# Patient Record
Sex: FEMALE | Race: WHITE | Employment: UNEMPLOYED | URBAN - METROPOLITAN AREA
[De-identification: names, ages, dates, MRNs, and addresses within clinical notes are randomized per-mention and may not be internally consistent; named-entity substitution may affect disease eponyms.]

---

## 2017-05-10 ENCOUNTER — ED HISTORICAL/CONVERTED ENCOUNTER (OUTPATIENT)
Dept: OTHER | Age: 62
End: 2017-05-10

## 2019-04-26 ENCOUNTER — ED HISTORICAL/CONVERTED ENCOUNTER (OUTPATIENT)
Dept: OTHER | Age: 64
End: 2019-04-26

## 2020-03-05 ENCOUNTER — APPOINTMENT (OUTPATIENT)
Dept: GENERAL RADIOLOGY | Age: 65
End: 2020-03-05
Attending: EMERGENCY MEDICINE
Payer: MEDICARE

## 2020-03-05 ENCOUNTER — HOSPITAL ENCOUNTER (EMERGENCY)
Age: 65
Discharge: HOME | End: 2020-03-06
Attending: EMERGENCY MEDICINE
Payer: MEDICARE

## 2020-03-05 DIAGNOSIS — S42.202A CLOSED TRAUMATIC NONDISPLACED FRACTURE OF PROXIMAL END OF LEFT HUMERUS, INITIAL ENCOUNTER: Primary | ICD-10-CM

## 2020-03-05 PROCEDURE — 99283 EMERGENCY DEPT VISIT LOW MDM: CPT

## 2020-03-05 PROCEDURE — 73060 X-RAY EXAM OF HUMERUS: CPT

## 2020-03-05 PROCEDURE — 74011250637 HC RX REV CODE- 250/637: Performed by: EMERGENCY MEDICINE

## 2020-03-05 RX ORDER — OXYCODONE AND ACETAMINOPHEN 7.5; 325 MG/1; MG/1
1 TABLET ORAL
Status: COMPLETED | OUTPATIENT
Start: 2020-03-05 | End: 2020-03-05

## 2020-03-05 RX ORDER — OXYCODONE AND ACETAMINOPHEN 5; 325 MG/1; MG/1
1 TABLET ORAL
Qty: 12 TAB | Refills: 0 | Status: SHIPPED | OUTPATIENT
Start: 2020-03-05 | End: 2020-03-08

## 2020-03-05 RX ORDER — ONDANSETRON 4 MG/1
4 TABLET, ORALLY DISINTEGRATING ORAL
Status: COMPLETED | OUTPATIENT
Start: 2020-03-05 | End: 2020-03-05

## 2020-03-05 RX ADMIN — OXYCODONE HYDROCHLORIDE AND ACETAMINOPHEN 1 TABLET: 7.5; 325 TABLET ORAL at 23:49

## 2020-03-05 RX ADMIN — ONDANSETRON 4 MG: 4 TABLET, ORALLY DISINTEGRATING ORAL at 23:49

## 2020-03-06 VITALS
SYSTOLIC BLOOD PRESSURE: 112 MMHG | OXYGEN SATURATION: 97 % | HEIGHT: 68 IN | RESPIRATION RATE: 18 BRPM | BODY MASS INDEX: 30.31 KG/M2 | TEMPERATURE: 97.3 F | WEIGHT: 200 LBS | DIASTOLIC BLOOD PRESSURE: 72 MMHG | HEART RATE: 90 BPM

## 2020-03-06 NOTE — DISCHARGE INSTRUCTIONS
Patient Education        Humerus Fracture: Care Instructions  Your Care Instructions    Your humerus is a bone in your upper arm. It extends from your shoulder to your elbow, and it is the largest bone in your arm. This bone may break (fracture) during sports, a fall, or other accidents. It may happen when your arm or shoulder is hit or used to protect you in a fall. Fractures can range from a small, hairline crack to a bone or bones broken into two or more pieces. Your treatment depends on how bad the break is. Your doctor may have put your arm in a cast, splint, or sling to allow it to heal or to keep it stable until you see another doctor. It may take weeks or months for your arm to heal. You can help your arm heal with some care at home. You heal best when you take good care of yourself. Eat a variety of healthy foods, and don't smoke. Follow-up care is a key part of your treatment and safety. Be sure to make and go to all appointments, and call your doctor if you are having problems. It's also a good idea to know your test results and keep a list of the medicines you take. How can you care for yourself at home? · Put ice or a cold pack on your arm for 10 to 20 minutes at a time. Try to do this every 1 to 2 hours for the next 3 days (when you are awake). Put a thin cloth between the ice and your cast or splint. Keep the cast or splint dry. If you do not have a splint or cast, use a cloth between the ice and your skin. · Follow the care instructions your doctor gives you. If you have a sling, do not take it off unless your doctor tells you to. · Be safe with medicines. Take pain medicines exactly as directed. ? If the doctor gave you a prescription medicine for pain, take it as prescribed. ? If you are not taking a prescription pain medicine, ask your doctor if you can take an over-the-counter medicine. · Your doctor may advise you to keep your arm next to your body.  It may help to use a pillow to support your elbow while sitting. · Follow instructions for moving your arm and doing exercises to keep your arm strong. · Wiggle your fingers and wrist often to reduce swelling and stiffness. When should you call for help? Call your doctor now or seek immediate medical care if:    · You have increased or severe pain in your arm.     · Your hand is cool or pale or changes color.     · You have tingling, weakness, or numbness in your hand or fingers.     · Your cast or splint feels too tight.     · You cannot move your fingers.     · The skin under your cast or splint is burning or stinging.    Watch closely for changes in your health, and be sure to contact your doctor if:    · You do not get better as expected. Where can you learn more? Go to http://joni-brunilda.info/. Enter P869 in the search box to learn more about \"Humerus Fracture: Care Instructions. \"  Current as of: June 26, 2019  Content Version: 12.2  © 3262-8844 [employer]. Care instructions adapted under license by [employer] (which disclaims liability or warranty for this information). If you have questions about a medical condition or this instruction, always ask your healthcare professional. [address] any warranty or liability for your use of this information.

## 2020-03-06 NOTE — ED PROVIDER NOTES
62 y.o. female with past medical history significant for diabetes and depression who presents from home accompanied by two family members with chief complaint of fall. She states she climbing up stairs an hour ago today when she lost her footing and fell, hitting her head against the wall and landing with her left arm torqued backwards behind her body. She complains of left arm pain, worsened with movement, as well as nausea and dizziness. She denies losing consciousness, and denies neck pain, headache, numbness, tingling, chest pain, abdominal pain, and leg pain. There are no other acute medical concerns at this time. Social hx: former smoker, rare alcohol use, denies illicit drug use  Surgical hx: none  PCP: None    Note written by Glynn Salazar, as dictated by Ralph Obando MD 10:59 PM        The history is provided by the patient. No  was used. Past Medical History:   Diagnosis Date    Diabetes ([facility] [address]     Psychiatric disorder     depression       No past surgical history on file. No family history on file.     Social History     Socioeconomic History    Marital status: SINGLE     Spouse name: Not on file    Number of children: Not on file    Years of education: Not on file    Highest education level: Not on file   Occupational History    Not on file   Social Needs    Financial resource strain: Not on file    Food insecurity:     Worry: Not on file     Inability: Not on file    Transportation needs:     Medical: Not on file     Non-medical: Not on file   Tobacco Use    Smoking status: Former Smoker   Substance and Sexual Activity    Alcohol use: Yes     Comment: rarely    Drug use: No    Sexual activity: Not on file   Lifestyle    Physical activity:     Days per week: Not on file     Minutes per session: Not on file    Stress: Not on file   Relationships    Social connections:     Talks on phone: Not on file     Gets together: Not on file     Attends Jain service: Not on file     Active member of club or organization: Not on file     Attends meetings of clubs or organizations: Not on file     Relationship status: Not on file    Intimate partner violence:     Fear of current or ex partner: Not on file     Emotionally abused: Not on file     Physically abused: Not on file     Forced sexual activity: Not on file   Other Topics Concern    Not on file   Social History Narrative    Not on file         ALLERGIES: Patient has no known allergies. Review of Systems   Cardiovascular: Negative for chest pain. Gastrointestinal: Positive for nausea. Negative for abdominal pain. Musculoskeletal: Negative for neck pain. Positive for arm pain (left)  Negative for leg pain   Neurological: Positive for dizziness. Negative for syncope, numbness and headaches. Negative for tingling   All other systems reviewed and are negative. Vitals:    03/05/20 2252 03/05/20 2352   BP: 112/72 (P) 100/88   Pulse: 90 (P) 99   Resp: 18 (P) 20   Temp: 97.3 °F (36.3 °C) (P) 98.5 °F (36.9 °C)   SpO2: 97% (P) 95%   Weight: 90.7 kg (200 lb)    Height: 5' 8\" (1.727 m)             Physical Exam  Vitals signs and nursing note reviewed. Constitutional:       Appearance: She is well-developed. Comments: Appears uncomfortable. HENT:      Head: Normocephalic and atraumatic. Eyes:      General: No scleral icterus. Conjunctiva/sclera: Conjunctivae normal.   Neck:      Musculoskeletal: Neck supple. Trachea: No tracheal deviation. Cardiovascular:      Rate and Rhythm: Normal rate and regular rhythm. Heart sounds: Normal heart sounds. No murmur. No friction rub. No gallop. Comments: 2+ radial pulses. Pulmonary:      Effort: Pulmonary effort is normal.      Breath sounds: Normal breath sounds. No wheezing or rales. Abdominal:      General: There is no distension. Palpations: Abdomen is soft. Tenderness: There is no abdominal tenderness. There is no guarding or rebound. Musculoskeletal:      Comments: Tenderness in left upper arm, resists movement of left arm. No neck tenderness. Skin:     General: Skin is warm and dry. Findings: No rash. Neurological:      Mental Status: She is alert and oriented to person, place, and time. Note written by Glynn Gee, as dictated by Jenna Gandara MD 10:59 PM      [facility]       Procedures    Progress Note:  Results, treatment, and follow up plan have been discussed with patient/family. Questions were answered. Brittani Balderas MD    Assessment/plan: Fall suffering a proximal left humerus fracture -neurovascularly intact. No signs of a severe head injury. Sling, ice, Ortho follow-up. Percocet for pain.   Brittani Balderas MD

## 2020-03-06 NOTE — ED TRIAGE NOTES
Pt reports slipped and fall from stairs. Pt hit her head with the wall and left arm was on the back when she landed on the floor. Pt also c/o nausea. Denies LOC, dizziness, headche, CP, or SOB. Pt unable to move her left arm d/t severe pain.

## 2020-03-06 NOTE — ED NOTES
Patient discharged from ED by provider. Discharge instructions reviewed with patient and all questions answered. Patient Wheelchair from ED in NAD.

## 2020-11-13 ENCOUNTER — HOSPITAL ENCOUNTER (EMERGENCY)
Age: 65
Discharge: HOME | End: 2020-11-13
Attending: EMERGENCY MEDICINE
Payer: MEDICARE

## 2020-11-13 ENCOUNTER — APPOINTMENT (OUTPATIENT)
Dept: GENERAL RADIOLOGY | Age: 65
End: 2020-11-13
Attending: EMERGENCY MEDICINE
Payer: MEDICARE

## 2020-11-13 ENCOUNTER — APPOINTMENT (OUTPATIENT)
Dept: CT IMAGING | Age: 65
End: 2020-11-13
Attending: EMERGENCY MEDICINE
Payer: MEDICARE

## 2020-11-13 VITALS
BODY MASS INDEX: 28.79 KG/M2 | HEIGHT: 68 IN | DIASTOLIC BLOOD PRESSURE: 95 MMHG | HEART RATE: 80 BPM | WEIGHT: 190 LBS | RESPIRATION RATE: 16 BRPM | SYSTOLIC BLOOD PRESSURE: 143 MMHG | TEMPERATURE: 98.6 F | OXYGEN SATURATION: 99 %
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
## 2020-11-13 DIAGNOSIS — S42.295D OTHER CLOSED NONDISPLACED FRACTURE OF PROXIMAL END OF LEFT HUMERUS WITH ROUTINE HEALING, SUBSEQUENT ENCOUNTER: ICD-10-CM

## 2020-11-13 DIAGNOSIS — R73.9 HYPERGLYCEMIA: ICD-10-CM

## 2020-11-13 LAB
ALBUMIN SERPL-MCNC: 3.3 G/DL (ref 3.5–5)
ALBUMIN/GLOB SERPL: 0.9 {RATIO} (ref 1.1–2.2)
ALP SERPL-CCNC: 104 U/L (ref 45–117)
ALT SERPL-CCNC: 27 U/L (ref 12–78)
ANION GAP SERPL CALC-SCNC: 8 MMOL/L (ref 5–15)
APPEARANCE UR: CLEAR
AST SERPL W P-5'-P-CCNC: 14 U/L (ref 15–37)
BACTERIA URNS QL MICRO: NEGATIVE /HPF
BASOPHILS # BLD: 0 K/UL (ref 0–0.1)
BASOPHILS NFR BLD: 0 % (ref 0–1)
BILIRUB SERPL-MCNC: 0.2 MG/DL (ref 0.2–1)
BILIRUB UR QL: NEGATIVE
BUN SERPL-MCNC: 16 MG/DL (ref 6–20)
BUN/CREAT SERPL: 19 (ref 12–20)
CA-I BLD-MCNC: 9 MG/DL (ref 8.5–10.1)
CHLORIDE SERPL-SCNC: 101 MMOL/L (ref 97–108)
CO2 SERPL-SCNC: 27 MMOL/L (ref 21–32)
COLOR UR: YELLOW
CREAT SERPL-MCNC: 0.83 MG/DL (ref 0.55–1.02)
DIFFERENTIAL METHOD BLD: NORMAL
EOSINOPHIL # BLD: 0.2 K/UL (ref 0–0.4)
EOSINOPHIL NFR BLD: 2 % (ref 0–7)
EPITH CASTS URNS QL MICRO: ABNORMAL /LPF
ERYTHROCYTE [DISTWIDTH] IN BLOOD BY AUTOMATED COUNT: 12.4 % (ref 11.5–14.5)
GLOBULIN SER CALC-MCNC: 3.8 G/DL (ref 2–4)
GLUCOSE BLD STRIP.AUTO-MCNC: 309 MG/DL (ref 65–100)
GLUCOSE SERPL-MCNC: 314 MG/DL (ref 65–100)
GLUCOSE UR STRIP.AUTO-MCNC: >1000 MG/DL
HCT VFR BLD AUTO: 38.7 % (ref 35–47)
HGB BLD-MCNC: 13.4 G/DL (ref 11.5–16)
HGB UR QL STRIP: NEGATIVE
IMM GRANULOCYTES # BLD AUTO: 0 K/UL (ref 0–0.04)
IMM GRANULOCYTES NFR BLD AUTO: 0 % (ref 0–0.5)
KETONES UR QL STRIP.AUTO: NEGATIVE MG/DL
LEUKOCYTE ESTERASE UR QL STRIP.AUTO: NEGATIVE
LYMPHOCYTES # BLD: 2.4 K/UL (ref 0.8–3.5)
LYMPHOCYTES NFR BLD: 30 % (ref 12–49)
MCH RBC QN AUTO: 31.1 PG (ref 26–34)
MCHC RBC AUTO-ENTMCNC: 34.6 G/DL (ref 30–36.5)
MCV RBC AUTO: 89.8 FL (ref 80–99)
MONOCYTES # BLD: 0.6 K/UL (ref 0–1)
MONOCYTES NFR BLD: 8 % (ref 5–13)
NEUTS SEG # BLD: 4.8 K/UL (ref 1.8–8)
NEUTS SEG NFR BLD: 60 % (ref 32–75)
NITRITE UR QL STRIP.AUTO: NEGATIVE
PERFORMED BY, TECHID: ABNORMAL
PH UR STRIP: 5 [PH] (ref 5–8)
PLATELET # BLD AUTO: 242 K/UL (ref 150–400)
PMV BLD AUTO: 9.3 FL (ref 8.9–12.9)
POTASSIUM SERPL-SCNC: 4 MMOL/L (ref 3.5–5.1)
PROT SERPL-MCNC: 7.1 G/DL (ref 6.4–8.2)
PROT UR STRIP-MCNC: NEGATIVE MG/DL
RBC # BLD AUTO: 4.31 M/UL (ref 3.8–5.2)
RBC #/AREA URNS HPF: ABNORMAL /HPF (ref 0–5)
SODIUM SERPL-SCNC: 136 MMOL/L (ref 136–145)
SP GR UR REFRACTOMETRY: 1.01 (ref 1–1.03)
TROPONIN I SERPL-MCNC: <0.05 NG/ML
UROBILINOGEN UR QL STRIP.AUTO: 0.1 EU/DL (ref 0.2–1)
WBC # BLD AUTO: 7.9 K/UL (ref 3.6–11)
WBC URNS QL MICRO: ABNORMAL /HPF (ref 0–4)

## 2020-11-13 PROCEDURE — 99284 EMERGENCY DEPT VISIT MOD MDM: CPT

## 2020-11-13 PROCEDURE — 85025 COMPLETE CBC W/AUTO DIFF WBC: CPT

## 2020-11-13 PROCEDURE — 80053 COMPREHEN METABOLIC PANEL: CPT

## 2020-11-13 PROCEDURE — 81001 URINALYSIS AUTO W/SCOPE: CPT

## 2020-11-13 PROCEDURE — 84484 ASSAY OF TROPONIN QUANT: CPT

## 2020-11-13 PROCEDURE — 82962 GLUCOSE BLOOD TEST: CPT

## 2020-11-13 PROCEDURE — 70450 CT HEAD/BRAIN W/O DYE: CPT

## 2020-11-13 PROCEDURE — 74011250636 HC RX REV CODE- 250/636: Performed by: EMERGENCY MEDICINE

## 2020-11-13 PROCEDURE — 73060 X-RAY EXAM OF HUMERUS: CPT

## 2020-11-13 PROCEDURE — 36415 COLL VENOUS BLD VENIPUNCTURE: CPT

## 2020-11-13 RX ORDER — SODIUM CHLORIDE 0.9 % (FLUSH) 0.9 %
5-40 SYRINGE (ML) INJECTION AS NEEDED
Status: DISCONTINUED | OUTPATIENT
Start: 2020-11-13 | End: 2020-11-14 | Stop reason: HOSPADM

## 2020-11-13 RX ORDER — SODIUM CHLORIDE 0.9 % (FLUSH) 0.9 %
5-40 SYRINGE (ML) INJECTION EVERY 8 HOURS
Status: DISCONTINUED | OUTPATIENT
Start: 2020-11-13 | End: 2020-11-14 | Stop reason: HOSPADM

## 2020-11-13 RX ADMIN — SODIUM CHLORIDE 1000 ML: 9 INJECTION, SOLUTION INTRAVENOUS at 20:04

## 2020-11-13 NOTE — ED PROVIDER NOTES
EMERGENCY DEPARTMENT HISTORY AND PHYSICAL EXAM      Date: 11/13/2020  Patient Name: Abigail Hardy      History of Presenting Illness     Chief Complaint   Patient presents with    Fall    High Blood Sugar    Dizziness       History Provided By: Patient    HPI: Abigail Hardy, 60 y.o. female with a past medical history significant diabetes and And humerus fracture within the past few months presents to the ED with cc of tripping over a speed bump and falling hitting her left arm and hitting her head. Patient conveys she does not remember hitting her head but denies having any loss of consciousness. This was witnessed by her son who also relates the same story. She states now that she does have a sharp pain in her left arm which is of her primary concern. She has mild headache but she has not treated it with anything. She specifically denies fever, chills, nausea, vomiting, chest pain, shortness of breath, rash, diarrhea, night sweats. There are no other complaints, changes, or physical findings at this time. PCP: None    Current Facility-Administered Medications   Medication Dose Route Frequency Provider Last Rate Last Dose    sodium chloride (NS) flush 5-40 mL  5-40 mL IntraVENous Q8H Melvin Plasencia MD        sodium chloride (NS) flush 5-40 mL  5-40 mL IntraVENous PRN Melvin Plasencia MD         Current Outpatient Medications   Medication Sig Dispense Refill    traMADol-acetaminophen (ULTRACET) 37.5-325 mg per tablet Take 1 Tab by mouth every six (6) hours as needed for Pain. Max Daily Amount: 4 Tabs. 20 Tab 0    metFORMIN (GLUCOPHAGE) 1,000 mg tablet Take 1,000 mg by mouth two (2) times daily (with meals). Indications: TYPE 2 DIABETES MELLITUS      glipiZIDE (GLUCOTROL) 5 mg tablet Take 5 mg by mouth two (2) times a day. Indications: TYPE 2 DIABETES MELLITUS      PARoxetine (PAXIL) 10 mg tablet Take  by mouth daily.  Indications: DEPRESSION         Past History     Past Medical History:  Past Medical History:   Diagnosis Date    Diabetes ([facility] [address]     Psychiatric disorder     depression       Past Surgical History:  No past surgical history on file. Family History:  No family history on file. Social History:  Social History     Tobacco Use    Smoking status: Former Smoker   Substance Use Topics    Alcohol use: Yes     Comment: rarely    Drug use: No       Allergies:  No Known Allergies      Review of Systems     Review of Systems   Constitutional: Negative. Negative for appetite change, chills, fatigue and fever. HENT: Negative. Negative for congestion and voice change. Eyes: Negative. Negative for pain and visual disturbance. Respiratory: Negative. Negative for chest tightness and shortness of breath. Cardiovascular: Negative. Negative for chest pain and palpitations. Gastrointestinal: Negative. Negative for abdominal pain, diarrhea, nausea and vomiting. Genitourinary: Negative. Negative for difficulty urinating. No discharge   Musculoskeletal: Positive for myalgias. Negative for arthralgias and back pain. Skin: Negative. Negative for rash. Neurological: Positive for light-headedness and headaches. Negative for weakness. Hematological: Negative. Psychiatric/Behavioral: Negative. Negative for agitation. The patient is not nervous/anxious. All other systems reviewed and are negative. Physical Exam     Physical Exam  Vitals signs and nursing note reviewed. Constitutional:       General: She is not in acute distress. Appearance: She is well-developed. HENT:      Head: Normocephalic and atraumatic. Nose: Nose normal.      Mouth/Throat:      Mouth: Mucous membranes are moist.      Pharynx: Oropharynx is clear. No oropharyngeal exudate. Eyes:      General:         Right eye: No discharge. Left eye: No discharge. Conjunctiva/sclera: Conjunctivae normal.      Pupils: Pupils are equal, round, and reactive to light. Neck:      Musculoskeletal: Normal range of motion and neck supple. Cardiovascular:      Rate and Rhythm: Normal rate and regular rhythm. Chest Wall: PMI is not displaced. No thrill. Heart sounds: Normal heart sounds. No murmur. No friction rub. No gallop. Pulmonary:      Effort: Pulmonary effort is normal. No respiratory distress. Breath sounds: Normal breath sounds. No wheezing or rales. Chest:      Chest wall: No tenderness. Abdominal:      General: Bowel sounds are normal. There is no distension. Palpations: Abdomen is soft. There is no mass. Tenderness: There is no abdominal tenderness. There is no guarding or rebound. Musculoskeletal: Normal range of motion. General: Tenderness present. Comments: Mild tender to palpation over left humerus but no appreciable crepitus. Lymphadenopathy:      Cervical: No cervical adenopathy. Skin:     General: Skin is warm and dry. Capillary Refill: Capillary refill takes less than 2 seconds. Findings: No erythema or rash. Neurological:      Mental Status: She is alert and oriented to person, place, and time. Cranial Nerves: No cranial nerve deficit.       Coordination: Coordination normal.   Psychiatric:         Mood and Affect: Mood normal.         Behavior: Behavior normal.         Lab and Diagnostic Study Results     Labs -     Recent Results (from the past 12 hour(s))   GLUCOSE, POC    Collection Time: 11/13/20  6:45 PM   Result Value Ref Range    Glucose (POC) 309 (H) 65 - 100 mg/dL    Performed by [employer]    CBC WITH AUTOMATED DIFF    Collection Time: 11/13/20  7:00 PM   Result Value Ref Range    WBC 7.9 3.6 - 11.0 K/uL    RBC 4.31 3.80 - 5.20 M/uL    HGB 13.4 11.5 - 16.0 g/dL    HCT 38.7 35.0 - 47.0 %    MCV 89.8 80.0 - 99.0 FL    MCH 31.1 26.0 - 34.0 PG    MCHC 34.6 30.0 - 36.5 g/dL    RDW 12.4 11.5 - 14.5 %    PLATELET 624 067 - 428 K/uL    MPV 9.3 8.9 - 12.9 FL    NEUTROPHILS 60 32 - 75 % LYMPHOCYTES 30 12 - 49 %    MONOCYTES 8 5 - 13 %    EOSINOPHILS 2 0 - 7 %    BASOPHILS 0 0 - 1 %    IMMATURE GRANULOCYTES 0 0.0 - 0.5 %    ABS. NEUTROPHILS 4.8 1.8 - 8.0 K/UL    ABS. LYMPHOCYTES 2.4 0.8 - 3.5 K/UL    ABS. MONOCYTES 0.6 0.0 - 1.0 K/UL    ABS. EOSINOPHILS 0.2 0.0 - 0.4 K/UL    ABS. BASOPHILS 0.0 0.0 - 0.1 K/UL    ABS. IMM. GRANS. 0.0 0.00 - 0.04 K/UL    DF AUTOMATED     METABOLIC PANEL, COMPREHENSIVE    Collection Time: 11/13/20  7:00 PM   Result Value Ref Range    Sodium 136 136 - 145 mmol/L    Potassium 4.0 3.5 - 5.1 mmol/L    Chloride 101 97 - 108 mmol/L    CO2 27 21 - 32 mmol/L    Anion gap 8 5 - 15 mmol/L    Glucose 314 (H) 65 - 100 mg/dL    BUN 16 6 - 20 mg/dL    Creatinine 0.83 0.55 - 1.02 mg/dL    BUN/Creatinine ratio 19 12 - 20      GFR est AA >60 >60 ml/min/1.73m2    GFR est non-AA >60 >60 ml/min/1.73m2    Calcium 9.0 8.5 - 10.1 mg/dL    Bilirubin, total 0.2 0.2 - 1.0 mg/dL    AST (SGOT) 14 (L) 15 - 37 U/L    ALT (SGPT) 27 12 - 78 U/L    Alk.  phosphatase 104 45 - 117 U/L    Protein, total 7.1 6.4 - 8.2 g/dL    Albumin 3.3 (L) 3.5 - 5.0 g/dL    Globulin 3.8 2.0 - 4.0 g/dL    A-G Ratio 0.9 (L) 1.1 - 2.2     URINALYSIS W/MICROSCOPIC    Collection Time: 11/13/20  7:00 PM   Result Value Ref Range    Color Yellow      Appearance Clear Clear      Specific gravity 1.015 1.003 - 1.030      pH (UA) 5.0 5.0 - 8.0      Protein Negative Negative mg/dL    Glucose >1,000 (A) Negative mg/dL    Ketone Negative Negative mg/dL    Bilirubin Negative Negative      Blood Negative Negative      Urobilinogen 0.1 (L) 0.2 - 1.0 EU/dL    Nitrites Negative Negative      Leukocyte Esterase Negative Negative      WBC 0-4 0 - 4 /hpf    RBC 0-5 0 - 5 /hpf    Epithelial cells Few Few /lpf    Bacteria Negative Negative /hpf   TROPONIN I    Collection Time: 11/13/20  7:00 PM   Result Value Ref Range    Troponin-I, Qt. <0.05 <0.05 ng/mL       Radiologic Studies -   [unfilled]  CT Results  (Last 48 hours) 11/13/20 1910  CT HEAD WO CONT Final result    Impression:  Impression: No evidence of acute intracranial event. Narrative:  Exam: Head CT without intravenous contrast       Comparison: 3/27/2016       Dose reduction: All CT scans at this facility are performed using dose reduction   optimization techniques as appropriate to perform the exam including the   following: automated exposure control, adjustments of the mA and/or kV according   to patient size, or use of iterative reconstruction technique. Findings: The exam is negative for evidence of intracranial mass, hemorrhage,   recent infarct, or significant asymmetric lesion. Ventricles and subarachnoid   spaces are within normal limits. . The skull base and calvarium are   unremarkable. CXR Results  (Last 48 hours)    None          Medical Decision Making and ED Course   - I am the first and primary provider for this patient. - I reviewed the vital signs, available nursing notes, past medical history, past surgical history, family history and social history. - Initial assessment performed. The patients presenting problems have been discussed, and the staff are in agreement with the care plan formulated and outlined with them. I have encouraged them to ask questions as they arise throughout their visit. Vital Signs-Reviewed the patient's vital signs. Patient Vitals for the past 12 hrs:   Temp Pulse Resp BP SpO2   11/13/20 1853 98.6 °F (37 °C) 80 16 (!) 143/95 99 %       EKG interpretation: (Preliminary): Performed at 1842 PM, and read at 18 46 PM  Ventricular rate 86 bpm, IL interval 170 ms, QRS duration 80 ms,  ms. Interpretation: Normal sinus rhythm. Normal EKG. Records Reviewed: Nursing Notes and Old Medical Records    The patient presents with fall with a differential diagnosis of contusion, strain, sprain, hyperglycemia, ACS, arrhythmia, orthostatic    ED Course:    We will assess with CT of the head, x-ray of the left arm and orthostatics with troponin. Provider Notes (Medical Decision Making):   Again to be signing out this patient to Dr. Joan Palencia who will follow up on the rest of the results as well as provide a disposition for this patient. Patient no apparent distress alert and oriented. No neuro deficit. Consultations:               Procedures and Critical Care       Performed by: Milana Nichole MD  PROCEDURES                  NOTES   :  I have spent critical care time involved in lab review, consultations with specialist, family decision- making, bedside attention and documentation. This time excludes time spent in any separate billed procedures. During this entire length of time I was immediately available to the patient . Milana Nichole MD        Disposition     Disposition:    Discharged    Remove if not discharged  DISCHARGE PLAN:  1. Current Discharge Medication List      CONTINUE these medications which have NOT CHANGED    Details   traMADol-acetaminophen (ULTRACET) 37.5-325 mg per tablet Take 1 Tab by mouth every six (6) hours as needed for Pain. Max Daily Amount: 4 Tabs. Qty: 20 Tab, Refills: 0      metFORMIN (GLUCOPHAGE) 1,000 mg tablet Take 1,000 mg by mouth two (2) times daily (with meals). Indications: TYPE 2 DIABETES MELLITUS      glipiZIDE (GLUCOTROL) 5 mg tablet Take 5 mg by mouth two (2) times a day. Indications: TYPE 2 DIABETES MELLITUS      PARoxetine (PAXIL) 10 mg tablet Take  by mouth daily. Indications: DEPRESSION           2. Follow-up Information     Follow up With Specialties Details Why Contact Info    Follow up with your primary care physician  Schedule an appointment as soon as possible for a visit in 3 days As needed         3. Return to ED if worse   4. Current Discharge Medication List          Diagnosis     Clinical Impression:   1. Fall, initial encounter    2. Hyperglycemia    3.  Other closed nondisplaced fracture of proximal end of left humerus with routine healing, subsequent encounter        Attestations:    Willard Chowdhury MD    Please note that this dictation was completed with [employer] the computer voice recognition software. Quite often unanticipated grammatical, syntax, homophones, and other interpretive errors are inadvertently transcribed by the computer software. Please disregard these errors. Please excuse any errors that have escaped final proofreading. Thank you.

## 2020-11-13 NOTE — ED TRIAGE NOTES
Patient arrives for complaints of a mechanical fall at approximately 1705 today when she was leaving [employer] and tripped over a speed bump. Patient now complaining of pain to palms where she hit the asphalt, dizziness, headache and states that her blood sugar is probably high because she's been out of glimepiride for x3 weeks. Also c/o polydipsia, polyphagia and polyuria.

## 2021-05-04 ENCOUNTER — TRANSCRIBE ORDER (OUTPATIENT)
Dept: SCHEDULING | Age: 66
End: 2021-05-04

## 2021-05-04 DIAGNOSIS — Z12.31 ENCOUNTER FOR MAMMOGRAM TO ESTABLISH BASELINE MAMMOGRAM: Primary | ICD-10-CM

## 2022-06-17 ENCOUNTER — TRANSCRIBE ORDER (OUTPATIENT)
Dept: SCHEDULING | Age: 67
End: 2022-06-17

## 2022-06-17 DIAGNOSIS — Z12.31 BREAST CANCER SCREENING BY MAMMOGRAM: Primary | ICD-10-CM

## 2022-06-20 ENCOUNTER — TRANSCRIBE ORDER (OUTPATIENT)
Dept: SCHEDULING | Age: 67
End: 2022-06-20

## 2022-06-20 DIAGNOSIS — Z12.31 SCREENING MAMMOGRAM FOR HIGH-RISK PATIENT: Primary | ICD-10-CM

## 2022-10-04 ENCOUNTER — HOSPITAL ENCOUNTER (EMERGENCY)
Age: 67
Discharge: HOME | End: 2022-10-05
Attending: STUDENT IN AN ORGANIZED HEALTH CARE EDUCATION/TRAINING PROGRAM
Payer: MEDICARE

## 2022-10-04 VITALS
RESPIRATION RATE: 20 BRPM | TEMPERATURE: 98 F | HEIGHT: 67 IN | SYSTOLIC BLOOD PRESSURE: 163 MMHG | DIASTOLIC BLOOD PRESSURE: 93 MMHG | OXYGEN SATURATION: 97 % | BODY MASS INDEX: 31.39 KG/M2 | WEIGHT: 200 LBS | HEART RATE: 94 BPM

## 2022-10-04 DIAGNOSIS — F33.1 MODERATE EPISODE OF RECURRENT MAJOR DEPRESSIVE DISORDER (HCC): Primary | ICD-10-CM

## 2022-10-04 DIAGNOSIS — L23.7 POISON IVY DERMATITIS: ICD-10-CM

## 2022-10-04 PROCEDURE — 74011636637 HC RX REV CODE- 636/637: Performed by: STUDENT IN AN ORGANIZED HEALTH CARE EDUCATION/TRAINING PROGRAM

## 2022-10-04 PROCEDURE — 74011250637 HC RX REV CODE- 250/637: Performed by: STUDENT IN AN ORGANIZED HEALTH CARE EDUCATION/TRAINING PROGRAM

## 2022-10-04 PROCEDURE — 99283 EMERGENCY DEPT VISIT LOW MDM: CPT

## 2022-10-04 RX ORDER — PREDNISONE 20 MG/1
20 TABLET ORAL ONCE
Status: COMPLETED | OUTPATIENT
Start: 2022-10-04 | End: 2022-10-04

## 2022-10-04 RX ORDER — DIPHENHYDRAMINE HCL 25 MG
25 CAPSULE ORAL
Status: COMPLETED | OUTPATIENT
Start: 2022-10-04 | End: 2022-10-04

## 2022-10-04 RX ADMIN — PREDNISONE 20 MG: 20 TABLET ORAL at 23:06

## 2022-10-04 RX ADMIN — DIPHENHYDRAMINE HYDROCHLORIDE 25 MG: 25 CAPSULE ORAL at 23:06

## 2022-10-05 LAB
ALBUMIN SERPL-MCNC: 3.4 G/DL (ref 3.5–5)
ALBUMIN/GLOB SERPL: 0.9 {RATIO} (ref 1.1–2.2)
ALP SERPL-CCNC: 104 U/L (ref 45–117)
ALT SERPL-CCNC: 21 U/L (ref 12–78)
AMPHET UR QL SCN: NEGATIVE
ANION GAP SERPL CALC-SCNC: 8 MMOL/L (ref 5–15)
APPEARANCE UR: CLEAR
AST SERPL W P-5'-P-CCNC: 12 U/L (ref 15–37)
BACTERIA URNS QL MICRO: NEGATIVE /HPF
BARBITURATES UR QL SCN: NEGATIVE
BASOPHILS # BLD: 0 K/UL (ref 0–0.1)
BASOPHILS NFR BLD: 0 % (ref 0–1)
BENZODIAZ UR QL: NEGATIVE
BILIRUB SERPL-MCNC: 0.3 MG/DL (ref 0.2–1)
BILIRUB UR QL: NEGATIVE
BUN SERPL-MCNC: 19 MG/DL (ref 6–20)
BUN/CREAT SERPL: 20 (ref 12–20)
CA-I BLD-MCNC: 9.3 MG/DL (ref 8.5–10.1)
CANNABINOIDS UR QL SCN: NEGATIVE
CHLORIDE SERPL-SCNC: 100 MMOL/L (ref 97–108)
CO2 SERPL-SCNC: 26 MMOL/L (ref 21–32)
COCAINE UR QL SCN: NEGATIVE
COLOR UR: ABNORMAL
CREAT SERPL-MCNC: 0.94 MG/DL (ref 0.55–1.02)
DIFFERENTIAL METHOD BLD: NORMAL
DRUG SCRN COMMENT,DRGCM: NORMAL
EOSINOPHIL # BLD: 0.3 K/UL (ref 0–0.4)
EOSINOPHIL NFR BLD: 3 % (ref 0–7)
ERYTHROCYTE [DISTWIDTH] IN BLOOD BY AUTOMATED COUNT: 13 % (ref 11.5–14.5)
ETHANOL SERPL-MCNC: <10 MG/DL
FLUAV RNA SPEC QL NAA+PROBE: NOT DETECTED
FLUBV RNA SPEC QL NAA+PROBE: NOT DETECTED
GLOBULIN SER CALC-MCNC: 4 G/DL (ref 2–4)
GLUCOSE SERPL-MCNC: 312 MG/DL (ref 65–100)
GLUCOSE UR STRIP.AUTO-MCNC: >300 MG/DL
HCT VFR BLD AUTO: 38 % (ref 35–47)
HGB BLD-MCNC: 13 G/DL (ref 11.5–16)
HGB UR QL STRIP: NEGATIVE
IMM GRANULOCYTES # BLD AUTO: 0 K/UL (ref 0–0.04)
IMM GRANULOCYTES NFR BLD AUTO: 0 % (ref 0–0.5)
KETONES UR QL STRIP.AUTO: NEGATIVE MG/DL
LEUKOCYTE ESTERASE UR QL STRIP.AUTO: NEGATIVE
LYMPHOCYTES # BLD: 2.9 K/UL (ref 0.8–3.5)
LYMPHOCYTES NFR BLD: 28 % (ref 12–49)
MCH RBC QN AUTO: 31 PG (ref 26–34)
MCHC RBC AUTO-ENTMCNC: 34.2 G/DL (ref 30–36.5)
MCV RBC AUTO: 90.7 FL (ref 80–99)
METHADONE UR QL: NEGATIVE
MONOCYTES # BLD: 0.7 K/UL (ref 0–1)
MONOCYTES NFR BLD: 6 % (ref 5–13)
NEUTS SEG # BLD: 6.4 K/UL (ref 1.8–8)
NEUTS SEG NFR BLD: 63 % (ref 32–75)
NITRITE UR QL STRIP.AUTO: NEGATIVE
NRBC # BLD: 0 K/UL (ref 0–0.01)
NRBC BLD-RTO: 0 PER 100 WBC
OPIATES UR QL: NEGATIVE
PCP UR QL: NEGATIVE
PH UR STRIP: 5 [PH] (ref 5–8)
PLATELET # BLD AUTO: 257 K/UL (ref 150–400)
PMV BLD AUTO: 9.4 FL (ref 8.9–12.9)
POTASSIUM SERPL-SCNC: 4 MMOL/L (ref 3.5–5.1)
PROT SERPL-MCNC: 7.4 G/DL (ref 6.4–8.2)
PROT UR STRIP-MCNC: NEGATIVE MG/DL
RBC # BLD AUTO: 4.19 M/UL (ref 3.8–5.2)
RBC #/AREA URNS HPF: ABNORMAL /HPF (ref 0–5)
SALICYLATES SERPL-MCNC: <1.7 MG/DL (ref 2.8–20)
SARS-COV-2, COV2: NOT DETECTED
SODIUM SERPL-SCNC: 134 MMOL/L (ref 136–145)
SP GR UR REFRACTOMETRY: 1.03 (ref 1–1.03)
UA: UC IF INDICATED,UAUC: ABNORMAL
UROBILINOGEN UR QL STRIP.AUTO: 0.1 EU/DL (ref 0.1–1)
WBC # BLD AUTO: 10.3 K/UL (ref 3.6–11)
WBC URNS QL MICRO: ABNORMAL /HPF (ref 0–4)

## 2022-10-05 PROCEDURE — 87186 SC STD MICRODIL/AGAR DIL: CPT

## 2022-10-05 PROCEDURE — 80053 COMPREHEN METABOLIC PANEL: CPT

## 2022-10-05 PROCEDURE — 36415 COLL VENOUS BLD VENIPUNCTURE: CPT

## 2022-10-05 PROCEDURE — 80307 DRUG TEST PRSMV CHEM ANLYZR: CPT

## 2022-10-05 PROCEDURE — 85025 COMPLETE CBC W/AUTO DIFF WBC: CPT

## 2022-10-05 PROCEDURE — 87636 SARSCOV2 & INF A&B AMP PRB: CPT

## 2022-10-05 PROCEDURE — 87077 CULTURE AEROBIC IDENTIFY: CPT

## 2022-10-05 PROCEDURE — 82077 ASSAY SPEC XCP UR&BREATH IA: CPT

## 2022-10-05 PROCEDURE — 87086 URINE CULTURE/COLONY COUNT: CPT

## 2022-10-05 PROCEDURE — 80179 DRUG ASSAY SALICYLATE: CPT

## 2022-10-05 PROCEDURE — 81001 URINALYSIS AUTO W/SCOPE: CPT

## 2022-10-05 RX ORDER — DIPHENHYDRAMINE HCL 25 MG
25 CAPSULE ORAL
Qty: 20 CAPSULE | Refills: 0 | Status: SHIPPED | OUTPATIENT
Start: 2022-10-05

## 2022-10-05 RX ORDER — GLIMEPIRIDE 4 MG/1
4 TABLET ORAL
Qty: 30 TABLET | Refills: 0 | Status: SHIPPED | OUTPATIENT
Start: 2022-10-05

## 2022-10-05 RX ORDER — CETIRIZINE HCL 10 MG
10 TABLET ORAL DAILY
Qty: 7 TABLET | Refills: 0 | Status: SHIPPED | OUTPATIENT
Start: 2022-10-05 | End: 2022-10-12

## 2022-10-05 NOTE — DISCHARGE INSTRUCTIONS
Thank you! Thank you for allowing me to care for you in the emergency department. I sincerely hope that you are satisfied with your visit today. It is my goal to provide you with excellent care. Below you will find a list of your labs and imaging from your visit today if applicable. Should you have any questions regarding these results please do not hesitate to call the emergency department. Please review [employer] for a more detailed result list since the below list may not be comprehensive. Instructions on how to sign up to [employer] should be provided in this packet. Labs -     Recent Results (from the past 12 hour(s))   CBC WITH AUTOMATED DIFF    Collection Time: 10/05/22 12:45 AM   Result Value Ref Range    WBC 10.3 3.6 - 11.0 K/uL    RBC 4.19 3.80 - 5.20 M/uL    HGB 13.0 11.5 - 16.0 g/dL    HCT 38.0 35.0 - 47.0 %    MCV 90.7 80.0 - 99.0 FL    MCH 31.0 26.0 - 34.0 PG    MCHC 34.2 30.0 - 36.5 g/dL    RDW 13.0 11.5 - 14.5 %    PLATELET 177 414 - 654 K/uL    MPV 9.4 8.9 - 12.9 FL    NRBC 0.0 0.0  WBC    ABSOLUTE NRBC 0.00 0.00 - 0.01 K/uL    NEUTROPHILS 63 32 - 75 %    LYMPHOCYTES 28 12 - 49 %    MONOCYTES 6 5 - 13 %    EOSINOPHILS 3 0 - 7 %    BASOPHILS 0 0 - 1 %    IMMATURE GRANULOCYTES 0 0 - 0.5 %    ABS. NEUTROPHILS 6.4 1.8 - 8.0 K/UL    ABS. LYMPHOCYTES 2.9 0.8 - 3.5 K/UL    ABS. MONOCYTES 0.7 0.0 - 1.0 K/UL    ABS. EOSINOPHILS 0.3 0.0 - 0.4 K/UL    ABS. BASOPHILS 0.0 0.0 - 0.1 K/UL    ABS. IMM.  GRANS. 0.0 0.00 - 0.04 K/UL    DF AUTOMATED     METABOLIC PANEL, COMPREHENSIVE    Collection Time: 10/05/22 12:45 AM   Result Value Ref Range    Sodium 134 (L) 136 - 145 mmol/L    Potassium 4.0 3.5 - 5.1 mmol/L    Chloride 100 97 - 108 mmol/L    CO2 26 21 - 32 mmol/L    Anion gap 8 5 - 15 mmol/L    Glucose 312 (H) 65 - 100 mg/dL    BUN 19 6 - 20 mg/dL    Creatinine 0.94 0.55 - 1.02 mg/dL    BUN/Creatinine ratio 20 12 - 20      eGFR >60 >60 ml/min/1.73m2    Calcium 9.3 8.5 - 10.1 mg/dL    Bilirubin, total 0.3 0.2 - 1.0 mg/dL    AST (SGOT) 12 (L) 15 - 37 U/L    ALT (SGPT) 21 12 - 78 U/L    Alk. phosphatase 104 45 - 117 U/L    Protein, total 7.4 6.4 - 8.2 g/dL    Albumin 3.4 (L) 3.5 - 5.0 g/dL    Globulin 4.0 2.0 - 4.0 g/dL    A-G Ratio 0.9 (L) 1.1 - 2.2     DRUG SCREEN, URINE    Collection Time: 10/05/22 12:45 AM   Result Value Ref Range    AMPHETAMINES Negative Negative      BARBITURATES Negative Negative      BENZODIAZEPINES Negative Negative      COCAINE Negative Negative      METHADONE Negative Negative      OPIATES Negative Negative      PCP(PHENCYCLIDINE) Negative Negative      THC (TH-CANNABINOL) Negative Negative      Drug screen comment        This test is a screen for drugs of abuse in a medical setting only (i.e., they are unconfirmed results and as such must not be used for non-medical purposes, e.g.,employment testing, legal testing). Due to its inherent nature, false positive (FP) and false negative (FN) results may be obtained. Therefore, if necessary for medical care, recommend confirmation of positive findings by GC/MS.      URINALYSIS W/ REFLEX CULTURE    Collection Time: 10/05/22 12:45 AM    Specimen: Urine   Result Value Ref Range    Color Yellow/Straw      Appearance Clear Clear      Specific gravity 1.027 1.003 - 1.030      pH (UA) 5.0 5.0 - 8.0      Protein Negative Negative mg/dL    Glucose >300 (A) Negative mg/dL    Ketone Negative Negative mg/dL    Bilirubin Negative Negative      Blood Negative Negative      Urobilinogen 0.1 0.1 - 1.0 EU/dL    Nitrites Negative Negative      Leukocyte Esterase Negative Negative      UA:UC IF INDICATED Urine Culture Ordered (A) Culture not indicated by UA result      WBC 0-5 0 - 4 /hpf    RBC 0-5 0 - 5 /hpf    Bacteria Negative Negative /hpf   COVID-19 WITH INFLUENZA A/B    Collection Time: 10/05/22 12:45 AM   Result Value Ref Range    SARS-CoV-2 by PCR Not Detected Not Detected      Influenza A by PCR Not Detected Not Detected      Influenza B by PCR Not Detected Not Detected     ETHYL ALCOHOL    Collection Time: 10/05/22 12:45 AM   Result Value Ref Range    ALCOHOL(ETHYL),SERUM <16 <34 mg/dL   SALICYLATE    Collection Time: 10/05/22 12:45 AM   Result Value Ref Range    Salicylate level <7.0 (L) 2.8 - 20.0 mg/dL       Radiologic Studies -   No orders to display     CT Results  (Last 48 hours)      None          CXR Results  (Last 48 hours)      None               If you feel that you have not received excellent quality care or timely care, please ask to speak to the nurse manager. Please choose us in the future for your continued health care needs. ------------------------------------------------------------------------------------------------------------  The exam and treatment you received in the Emergency Department were for an urgent problem and are not intended as complete care. It is important that you follow-up with a doctor, nurse practitioner, or physician assistant to:  (1) confirm your diagnosis,  (2) re-evaluation of changes in your illness and treatment, and  (3) for ongoing care. If your symptoms become worse or you do not improve as expected and you are unable to reach your usual health care provider, you should return to the Emergency Department. We are available 24 hours a day. Please take your discharge instructions with you when you go to your follow-up appointment. If a prescription has been provided, please have it filled as soon as possible to prevent a delay in treatment. Read the entire medication instruction sheet provided to you by the pharmacy. If you have any questions or reservations about taking the medication due to side effects or interactions with other medications, please call your primary care physician or contact the ER to speak with the charge nurse.      Make an appointment with your family doctor or the physician you were referred to for follow-up of this visit as instructed on your discharge paperwork, as this is a mandatory follow-up. Return to the ER if you are unable to be seen or if you are unable to be seen in a timely manner. If you have any problem arranging the follow-up visit, contact the Emergency Department immediately.

## 2022-10-05 NOTE — BSMART NOTE
Dr. Cyndy Trejo consulted and states patient does not meet inpatient criteria, but advised to offer PHP and/or outpatient resources. This writer presented patient with PHP and outpatient resources as options. Patient not receptive, stating that this was a waste of her time as she thought she would be admitted. Patient states that PHP is not feasible because she has to take her grandchild to school, despite wanting inpatient admission where she would not be available to take grandchild to school. This writer spent a lengthy amount of time with patient explaining that inpatient admission is not warranted at this time as there are least restrictive options. Ultimately, patient declines PHP referral and would like outpatient resources for psychiatry and therapy. This writer provided patient with both. She also requested to speak to Dr. Diana Barraza about lab results. Informed Dr. Diana Barraza.
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
## 2022-10-05 NOTE — ED NOTES
Pt provided with food, drink, and blankets. Pt states that she really feels like she needs to be admitted somewhere because her feelings are \"getting worse. \" Spoke and provided support to pt. Pt currently sitting in chair in room with no further complaints.  Pt did express concerns for time on getting a bed, notified pt about possible wait time and recommended her try to get comfortable in bed/get rest.
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
## 2022-10-07 LAB
BACTERIA SPEC CULT: ABNORMAL
COLONY COUNT,CNT: ABNORMAL
SPECIAL REQUESTS,SREQ: ABNORMAL

## 2022-10-15 NOTE — ED NOTES
Bedside shift change report given to Jackie Washington (oncoming nurse) by Fox (offgoing nurse). Report included the following information SBAR. 60 yo M with PMH significant for 40+ pack year smoking history, HTN (untreated), SAVANNAH (no longer on CPAP after significant 100+ lb intentional wt loss), alcohol abuse (5+ mixed drinks daily) presenting from [facility] with altered mental status iso alcohol intoxication, admitted for hypercarbic respiratory failure likely 2/2 alcohol intoxication vs possible superimposed opioid overdose. Almost intubated in ED however had response to Narcan in ED and is tolerating bipap at this time although intermittently lethargic.

## 2022-12-06 ENCOUNTER — APPOINTMENT (OUTPATIENT)
Dept: GENERAL RADIOLOGY | Age: 67
End: 2022-12-06
Attending: STUDENT IN AN ORGANIZED HEALTH CARE EDUCATION/TRAINING PROGRAM
Payer: MEDICARE

## 2022-12-06 ENCOUNTER — HOSPITAL ENCOUNTER (EMERGENCY)
Age: 67
Discharge: HOME | End: 2022-12-06
Attending: EMERGENCY MEDICINE
Payer: MEDICARE

## 2022-12-06 VITALS
OXYGEN SATURATION: 99 % | HEIGHT: 67 IN | TEMPERATURE: 98.2 F | SYSTOLIC BLOOD PRESSURE: 136 MMHG | BODY MASS INDEX: 30.45 KG/M2 | RESPIRATION RATE: 20 BRPM | WEIGHT: 194 LBS | HEART RATE: 95 BPM | DIASTOLIC BLOOD PRESSURE: 76 MMHG

## 2022-12-06 DIAGNOSIS — B34.9 VIRAL ILLNESS: Primary | ICD-10-CM

## 2022-12-06 LAB
FLUAV AG NPH QL IA: NEGATIVE
FLUBV AG NOSE QL IA: NEGATIVE

## 2022-12-06 PROCEDURE — 99284 EMERGENCY DEPT VISIT MOD MDM: CPT

## 2022-12-06 PROCEDURE — 71045 X-RAY EXAM CHEST 1 VIEW: CPT

## 2022-12-06 PROCEDURE — 87804 INFLUENZA ASSAY W/OPTIC: CPT

## 2022-12-06 RX ORDER — FLUTICASONE PROPIONATE 50 MCG
2 SPRAY, SUSPENSION (ML) NASAL DAILY
Qty: 16 G | Refills: 0 | Status: SHIPPED | OUTPATIENT
Start: 2022-12-06

## 2022-12-06 RX ORDER — CETIRIZINE HYDROCHLORIDE 10 MG/1
10 TABLET ORAL DAILY
Qty: 30 TABLET | Refills: 0 | Status: SHIPPED | OUTPATIENT
Start: 2022-12-06

## 2022-12-06 NOTE — DISCHARGE INSTRUCTIONS
Thank you! Thank you for allowing me to care for you in the emergency department. It is my goal to provide you with excellent care. If you have not received excellent quality care, please ask to speak to the nurse manager. Please fill out the survey that will come to you by mail or email since we listen to your feedback! Below you will find a list of your tests from today's visit. Should you have any questions, please do not hesitate to call the emergency department. Labs  Recent Results (from the past 12 hour(s))   INFLUENZA A & B AG (RAPID TEST)    Collection Time: 12/06/22  5:20 PM   Result Value Ref Range    Influenza A Antigen Negative Negative      Influenza B Antigen Negative Negative         Radiologic Studies  XR CHEST PORT   Final Result   No acute cardiopulmonary findings. CT Results  (Last 48 hours)      None          CXR Results  (Last 48 hours)                 12/06/22 1734  XR CHEST PORT Final result    Impression:  No acute cardiopulmonary findings. Narrative:  EXAM: XR CHEST PORT       DATE: 12/6/2022 5:34 PM       INDICATION: cough       COMPARISON: Chest May 25, 2011       FINDINGS: AP portable chest radiograph. The lungs are adequately expanded. The   heart size is within normal limits. There is no focal airspace consolidation. The vascular clarity is within normal limits. There is no evidence of pleural   effusion or pneumothorax. A partially ununited fracture of the LEFT humeral neck   is noted. ------------------------------------------------------------------------------------------------------------  The exam and treatment you received in the Emergency Department were for an urgent problem and are not intended as complete care.  It is important that you follow-up with a doctor, nurse practitioner, or physician assistant to:  (1) confirm your diagnosis,  (2) re-evaluation of changes in your illness and treatment, and  (3) for ongoing care. Please take your discharge instructions with you when you go to your follow-up appointment. If you have any problem arranging a follow-up appointment, contact the Emergency Department. If your symptoms become worse or you do not improve as expected and you are unable to reach your health care provider, please return to the Emergency Department. We are available 24 hours a day. If a prescription has been provided, please have it filled as soon as possible to prevent a delay in treatment. If you have any questions or reservations about taking the medication due to side effects or interactions with other medications, please call your primary care provider or contact the ER.

## 2022-12-06 NOTE — Clinical Note
[address] [facility] EMERGENCY DEPT  [address] [place] [id number]  [phone]    Work/School Note    Date: 12/6/2022    To Whom It May concern:      Jennifer Sullivan was seen and treated today in the emergency room by the following provider(s):  Attending Provider: Beau Atkinson is excused from work/school on 12/06/22. She is clear to return to work/school on 12/07/22.         Sincerely,          Rosmery Left, DO

## 2022-12-07 NOTE — ED PROVIDER NOTES
EMERGENCY DEPARTMENT HISTORY AND PHYSICAL EXAM      Date: 12/6/2022  Patient Name: Kimo Banuelos    History of Presenting Illness     Chief Complaint   Patient presents with    Flu Like Symptoms       History Provided By: Patient    HPI: Kimo Banuelos, 78 y.o. female presents to the ED with CC of cough, congestion, headache x3 days. Patient has not taken anything for symptoms prior to arrival.  No confirmed sick contacts. No provocative or palliative factors. Patient denies SOB, chest pain, or any neurological symptoms. There are no other complaints, changes, or physical findings at this time. Past History     Past Medical History:  Past Medical History:   Diagnosis Date    Diabetes ([facility] [address]     Psychiatric disorder     depression       Allergies:  No Known Allergies    Review of Systems   Vital signs and nursing notes reviewed  Review of Systems   Constitutional:  Negative for chills and fever. HENT:  Positive for congestion and sore throat. Eyes:  Negative for pain and visual disturbance. Respiratory:  Positive for cough. Negative for shortness of breath. Cardiovascular:  Negative for chest pain and palpitations. Gastrointestinal:  Negative for constipation, diarrhea, nausea and vomiting. Genitourinary:  Negative for dysuria and frequency. Musculoskeletal:  Negative for arthralgias and myalgias. Skin:  Negative for color change and rash. Neurological:  Positive for headaches. Negative for dizziness, weakness and light-headedness. Psychiatric/Behavioral:  Negative for dysphoric mood and sleep disturbance. Physical Exam   Visit Vitals  /76 (BP 1 Location: Right upper arm, BP Patient Position: At rest)   Pulse 95   Temp 98.2 °F (36.8 °C)   Resp 20   Ht 5' 7\" (1.702 m)   Wt 88 kg (194 lb)   SpO2 99%   BMI 30.38 kg/m²     CONSTITUTIONAL: Alert, in no distress. Appears stated age. HEAD:  Normocephalic, atraumatic  EYES: EOM intact.   No conjunctival injection or scleral icterus  Neck:  Supple. No meningismus  RESP: Normal with no work of breathing, speaking in full sentences. CV: Well perfused. NEURO: Alert with normal mentation, moving extremities spontaneously  PSYCH: Normal mood, normal affect      Medical Decision Making   Patient presents with normal oxygen saturation and mild URI symptoms. Influenza testing was conducted and found to be negative. Chest x-ray negative for pulmonary infiltrate. The patient was given quarantine/isolation recommendations and agrees with the plan to be discharged home. They were provided instructions to return for difficulty breathing, chest pain, altered mentation, or any other new or worsening symptoms. ED Course:   Initial assessment performed. The patients presenting problems have been discussed, and they are in agreement with the care plan formulated and outlined with them. I have encouraged them to ask questions as they arise throughout their visit. Critical Care Time: None    Disposition:  DISCHARGE NOTE:  The pt is ready for discharge. The pt's signs, symptoms, diagnosis, and discharge instructions have been discussed and pt has conveyed their understanding. The pt is to follow up as recommended or return to ER should their symptoms worsen. Plan has been discussed and pt is in agreement. PLAN:  1. Discharge Medication List as of 12/6/2022  6:59 PM        2. Follow-up Information       Follow up With Specialties Details Why Contact Info    Natalie Solitario MD Family Medicine Schedule an appointment as soon as possible for a visit   [address] [place] [address]  [phone] [address] [facility] EMERGENCY DEPT Emergency Medicine  As needed, If symptoms worsen [address] [facility] [address] [phone]          4. Take Tylenol or Ibuprofen as needed  5. Drink plenty of fluids  6. Return to ED if worse especially if any shortness of breath, chest pain or altered mentation.     Diagnosis     Clinical Impression:   1. Viral illness        Please note that this dictation was completed with [employer], the computer voice recognition software. Quite often unanticipated grammatical, syntax, homophones, and other interpretive errors are inadvertently transcribed by the computer software. Please disregards these errors. Please excuse any errors that have escaped final proofreading.

## 2023-01-31 ENCOUNTER — APPOINTMENT (OUTPATIENT)
Dept: CT IMAGING | Age: 68
End: 2023-01-31
Attending: NURSE PRACTITIONER
Payer: MEDICARE

## 2023-01-31 ENCOUNTER — HOSPITAL ENCOUNTER (EMERGENCY)
Age: 68
Discharge: HOME | End: 2023-01-31
Payer: MEDICARE

## 2023-01-31 VITALS
OXYGEN SATURATION: 99 % | SYSTOLIC BLOOD PRESSURE: 110 MMHG | RESPIRATION RATE: 18 BRPM | HEART RATE: 98 BPM | WEIGHT: 194 LBS | BODY MASS INDEX: 30.45 KG/M2 | DIASTOLIC BLOOD PRESSURE: 62 MMHG | HEIGHT: 67 IN | TEMPERATURE: 98.4 F

## 2023-01-31 DIAGNOSIS — M54.2 CERVICALGIA: ICD-10-CM
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
## 2023-01-31 DIAGNOSIS — R73.9 HYPERGLYCEMIA: ICD-10-CM

## 2023-01-31 DIAGNOSIS — S09.90XA CLOSED HEAD INJURY, INITIAL ENCOUNTER: ICD-10-CM

## 2023-01-31 LAB
GLUCOSE BLD STRIP.AUTO-MCNC: 291 MG/DL (ref 65–100)
PERFORMED BY, TECHID: ABNORMAL

## 2023-01-31 PROCEDURE — 72125 CT NECK SPINE W/O DYE: CPT

## 2023-01-31 PROCEDURE — 99284 EMERGENCY DEPT VISIT MOD MDM: CPT | Performed by: NURSE PRACTITIONER

## 2023-01-31 PROCEDURE — 74011250636 HC RX REV CODE- 250/636: Performed by: NURSE PRACTITIONER

## 2023-01-31 PROCEDURE — 70450 CT HEAD/BRAIN W/O DYE: CPT

## 2023-01-31 PROCEDURE — 82962 GLUCOSE BLOOD TEST: CPT

## 2023-01-31 PROCEDURE — 74011250637 HC RX REV CODE- 250/637: Performed by: NURSE PRACTITIONER

## 2023-01-31 RX ORDER — METHOCARBAMOL 500 MG/1
500 TABLET, FILM COATED ORAL
Qty: 10 TABLET | Refills: 0 | Status: SHIPPED | OUTPATIENT
Start: 2023-01-31

## 2023-01-31 RX ORDER — MECLIZINE HYDROCHLORIDE 25 MG/1
25 TABLET ORAL
Qty: 20 TABLET | Refills: 0 | Status: SHIPPED | OUTPATIENT
Start: 2023-01-31

## 2023-01-31 RX ORDER — ACETAMINOPHEN 500 MG
500 TABLET ORAL
Qty: 20 TABLET | Refills: 0 | Status: SHIPPED | OUTPATIENT
Start: 2023-01-31

## 2023-01-31 RX ORDER — ACETAMINOPHEN 500 MG
1000 TABLET ORAL ONCE
Status: COMPLETED | OUTPATIENT
Start: 2023-01-31 | End: 2023-01-31

## 2023-01-31 RX ORDER — MECLIZINE HCL 12.5 MG 12.5 MG/1
25 TABLET ORAL
Status: COMPLETED | OUTPATIENT
Start: 2023-01-31 | End: 2023-01-31

## 2023-01-31 RX ADMIN — ACETAMINOPHEN 1000 MG: 500 TABLET ORAL at 14:51

## 2023-01-31 RX ADMIN — MECLIZINE 25 MG: 12.5 TABLET ORAL at 14:51

## 2023-01-31 NOTE — DISCHARGE INSTRUCTIONS
Thank you! Thank you for allowing me to care for you in the emergency department. It is my goal to provide you with excellent care. If you have not received excellent quality care, please ask to speak to the nurse manager. Please fill out the survey that will come to you by mail or email since we listen to your feedback! Below you will find a list of your tests from today's visit. Should you have any questions, please do not hesitate to call the emergency department. Labs  Recent Results (from the past 12 hour(s))   GLUCOSE, POC    Collection Time: 01/31/23  2:48 PM   Result Value Ref Range    Glucose (POC) 291 (H) 65 - 100 mg/dL    Performed by [facility])        Radiologic Studies  CT HEAD WO CONT   Final Result      No acute intracranial process identified         CT SPINE CERV WO CONT   Final Result   No acute fracture nor subluxation. CT Results  (Last 48 hours)                 01/31/23 1454  CT HEAD WO CONT Final result    Impression:      No acute intracranial process identified           Narrative:  EXAM: CT HEAD WO CONT       INDICATION: fall, loss of consciousness       COMPARISON: 11.13.2020. CONTRAST: None. TECHNIQUE: Unenhanced CT of the head was performed using 5 mm images. Brain and   bone windows were generated. Coronal and sagittal reformats. CT dose reduction   was achieved through use of a standardized protocol tailored for this   examination and automatic exposure control for dose modulation. FINDINGS:   The ventricles and sulci are normal in size, shape and configuration. There is   no significant white matter disease. There is no intracranial hemorrhage,   extra-axial collection, or mass effect. The basilar cisterns are open. No CT   evidence of acute infarct. The bone windows demonstrate no abnormalities. The visualized portions of the   paranasal sinuses and mastoid air cells are remarkable for mucus in both   maxillary sinuses. 01/31/23 1454  CT SPINE CERV WO CONT Final result    Impression:  No acute fracture nor subluxation. Narrative:  EXAM:  CT CERVICAL SPINE WITHOUT CONTRAST       INDICATION:   fall        COMPARISON: None. TECHNIQUE: Helical CT of the cervical spine was performed without contrast.    Sagittal and coronal reconstructions were obtained. CT dose reduction was   achieved through the use of a standardized protocol tailored for this   examination and automatic exposure control for dose modulation. CONTRAST: None. FINDINGS:       Alignment is within normal limits. There is no fracture or subluxation. Odontoid   process is intact. The craniocervical junction is within normal limits. Degenerative changes. Atherosclerosis. CXR Results  (Last 48 hours)      None          ------------------------------------------------------------------------------------------------------------  The exam and treatment you received in the Emergency Department were for an urgent problem and are not intended as complete care. It is important that you follow-up with a doctor, nurse practitioner, or physician assistant to:  (1) confirm your diagnosis,  (2) re-evaluation of changes in your illness and treatment, and  (3) for ongoing care. Please take your discharge instructions with you when you go to your follow-up appointment. If you have any problem arranging a follow-up appointment, contact the Emergency Department. If your symptoms become worse or you do not improve as expected and you are unable to reach your health care provider, please return to the Emergency Department. We are available 24 hours a day. If a prescription has been provided, please have it filled as soon as possible to prevent a delay in treatment.  If you have any questions or reservations about taking the medication due to side effects or interactions with other medications, please call your primary care provider or contact the ER.

## 2023-01-31 NOTE — ED TRIAGE NOTES
Pt fell out of bathtub and hit her head on the baseboard heater. Patient rates pain 6/10. Reports nausea. +LOC.  Denies blood thinners

## 2023-01-31 NOTE — Clinical Note
[address] EMERGENCY DEPARTMENT  [address] [place] [id number]  [phone]    Work/School Note    Date: 1/31/2023    To Whom It May concern:      Gabriel Armijo was seen and treated today in the emergency room by the following provider(s):  Nurse Practitioner: Emil Rivera NP. Gabriel Armijo is excused from work/school on 01/31/23. She is clear to return to work/school on 02/01/23.         Sincerely,          Alba Tafoya NP

## 2023-01-31 NOTE — ED PROVIDER NOTES
[facility] EMERGENCY DEPARTMENT  EMERGENCY DEPARTMENT HISTORY AND PHYSICAL EXAM      Date: 1/31/2023  Patient Name: Lissette Baker  MRN: [id number]  YOB: 1955  Date of evaluation: 1/31/2023  Provider: Ben Armijo NP   Note Started: 3:21 PM 1/31/23    HISTORY OF PRESENT ILLNESS     Chief Complaint   Patient presents with    Fall       History Provided By: Patient    HPI: Lissette Bakre, 78 y.o. female DM, depression c/o HA, neck pain sp/p fall about 2 hrs prior to arrival she reports she was in the shower hanging up her washcloth when she lost balance fell forward grabbing the shower curtain suffered from a fall hitting her head on the electric baseboard. Reports being ambulatory status post fall happened so quickly. she ports being unsure of LOC, denies any abrasion, laceration, erythema, edema, warmth, drainage, laceration to site. PAST MEDICAL HISTORY   Past Medical History:  Past Medical History:   Diagnosis Date    Diabetes [facility])     Psychiatric disorder     depression       Past Surgical History:  No past surgical history on file. Family History:  No family history on file. Social History:  Social History     Tobacco Use    Smoking status: Former   Substance Use Topics    Alcohol use: Not Currently     Comment: rarely    Drug use: No       Allergies:  No Known Allergies    PCP: Naima Long MD    Current Meds:   Previous Medications    CETIRIZINE (ZYRTEC) 10 MG TABLET    Take 1 Tablet by mouth daily. DIPHENHYDRAMINE (BENADRYL) 25 MG CAPSULE    Take 1 Capsule by mouth every six (6) hours as needed for Itching. FLUTICASONE PROPIONATE (FLONASE) 50 MCG/ACTUATION NASAL SPRAY    2 Sprays by Both Nostrils route daily. GLIMEPIRIDE (AMARYL) 4 MG TABLET    Take 1 Tablet by mouth every morning. METFORMIN (GLUCOPHAGE) 1,000 MG TABLET    Take 1,000 mg by mouth two (2) times daily (with meals). Indications: TYPE 2 DIABETES MELLITUS    PAROXETINE (PAXIL) 10 MG TABLET    Take  by mouth daily. Indications: DEPRESSION    TRAMADOL-ACETAMINOPHEN (ULTRACET) 37.5-325 MG PER TABLET    Take 1 Tab by mouth every six (6) hours as needed for Pain. Max Daily Amount: 4 Tabs. REVIEW OF SYSTEMS   Review of Systems   Constitutional:  Negative for chills and fever. Respiratory:  Negative for shortness of breath. Cardiovascular:  Negative for chest pain. Musculoskeletal:  Positive for myalgias and neck pain. Neurological:  Positive for headaches. Negative for dizziness, weakness and light-headedness. All other systems reviewed and are negative. Positives and Pertinent negatives as per HPI. PHYSICAL EXAM     ED Triage Vitals   ED Encounter Vitals Group      BP 01/31/23 1415 110/62      Pulse (Heart Rate) 01/31/23 1415 98      Resp Rate 01/31/23 1415 18      Temp 01/31/23 1415 98.4 °F (36.9 °C)      Temp src --       O2 Sat (%) 01/31/23 1415 99 %      Weight 01/31/23 1415 194 lb      Height 01/31/23 1415 5' 7\"      Physical Exam  Vitals and nursing note reviewed. Constitutional:       General: She is not in acute distress. Appearance: Normal appearance. She is normal weight. She is not ill-appearing or toxic-appearing. HENT:      Head: Normocephalic and atraumatic. Right Ear: Hearing normal.      Left Ear: Hearing normal.      Nose: Nose normal.      Mouth/Throat:      Mouth: Mucous membranes are moist.   Eyes:      General: Lids are normal.      Extraocular Movements: Extraocular movements intact. Pupils: Pupils are equal, round, and reactive to light. Cardiovascular:      Rate and Rhythm: Normal rate and regular rhythm. Pulses: Normal pulses. Radial pulses are 2+ on the right side and 2+ on the left side. Dorsalis pedis pulses are 2+ on the right side and 2+ on the left side. Pulmonary:      Effort: Pulmonary effort is normal. No accessory muscle usage or respiratory distress. Breath sounds: Normal breath sounds. No wheezing or rhonchi.    Abdominal: General: Bowel sounds are normal.      Palpations: Abdomen is soft. Tenderness: There is no abdominal tenderness. There is no right CVA tenderness or left CVA tenderness. Musculoskeletal:      Cervical back: Normal range of motion and neck supple. No muscular tenderness. Right lower leg: No edema. Left lower leg: No edema. Feet:      Right foot:      Skin integrity: No skin breakdown. Left foot:      Skin integrity: No skin breakdown. Skin:     General: Skin is warm and dry. Capillary Refill: Capillary refill takes less than 2 seconds. Findings: No abrasion, bruising, ecchymosis, erythema or signs of injury. Neurological:      Mental Status: She is alert and oriented to person, place, and time. GCS: GCS eye subscore is 4. GCS verbal subscore is 5. GCS motor subscore is 6. Sensory: Sensation is intact. Motor: Motor function is intact. Coordination: Coordination is intact. Gait: Gait is intact. Psychiatric:         Attention and Perception: Attention normal.         Mood and Affect: Mood normal.         Behavior: Behavior normal. Behavior is cooperative. Cognition and Memory: Cognition normal.       SCREENINGS               No data recorded        LAB, EKG AND DIAGNOSTIC RESULTS   Labs:  Recent Results (from the past 12 hour(s))   GLUCOSE, POC    Collection Time: 01/31/23  2:48 PM   Result Value Ref Range    Glucose (POC) 291 (H) 65 - 100 mg/dL    Performed by Kartik Hanson [facility])          Interpretation per the Radiologist below, if available at the time of this note:  CT HEAD WO CONT    Result Date: 1/31/2023  EXAM: CT HEAD WO CONT INDICATION: fall, loss of consciousness COMPARISON: 11.13.2020. CONTRAST: None. TECHNIQUE: Unenhanced CT of the head was performed using 5 mm images. Brain and bone windows were generated. Coronal and sagittal reformats.  CT dose reduction was achieved through use of a standardized protocol tailored for this examination and automatic exposure control for dose modulation. FINDINGS: The ventricles and sulci are normal in size, shape and configuration. There is no significant white matter disease. There is no intracranial hemorrhage, extra-axial collection, or mass effect. The basilar cisterns are open. No CT evidence of acute infarct. The bone windows demonstrate no abnormalities. The visualized portions of the paranasal sinuses and mastoid air cells are remarkable for mucus in both maxillary sinuses. No acute intracranial process identified     CT SPINE CERV WO CONT    Result Date: 1/31/2023  EXAM:  CT CERVICAL SPINE WITHOUT CONTRAST INDICATION:   fall COMPARISON: None. TECHNIQUE: Helical CT of the cervical spine was performed without contrast. Sagittal and coronal reconstructions were obtained. CT dose reduction was achieved through the use of a standardized protocol tailored for this examination and automatic exposure control for dose modulation. CONTRAST: None. FINDINGS: Alignment is within normal limits. There is no fracture or subluxation. Odontoid process is intact. The craniocervical junction is within normal limits. Degenerative changes. Atherosclerosis. No acute fracture nor subluxation. PROCEDURES   Unless otherwise noted below, none. Performed by: Scott Whitlock NP   Procedures      CRITICAL CARE TIME       ED COURSE and DIFFERENTIAL DIAGNOSIS/MDM   Vitals:    Vitals:    01/31/23 1415 01/31/23 1415   BP: 110/62    Pulse: 98    Resp: 18    Temp: 98.4 °F (36.9 °C)    SpO2: 99%    Weight:  88 kg (194 lb)   Height:  5' 7\" (1.702 m)        Patient was given the following medications:  Medications   meclizine (ANTIVERT) tablet 25 mg (25 mg Oral Given 1/31/23 1451)   acetaminophen (TYLENOL) tablet 1,000 mg (1,000 mg Oral Given 1/31/23 1451)     Patient is a 78 y.o. female presenting for head trauma. Vitals reveal no significant abnormalities and physical exam reveals no significant abnormalities.  Based on the history, physical exam, risk factors, and vitals signs, differential includes: soft tissue contusion, abrasion, concussion. CT was negative for any acute findings. This presentation is not worrisome for I[facility] as the patient has normal mentation, no red flags on history or physical examination, and normal vital signs. Since the patient has had normal mentation throughout the ED stay, no vomiting, no loss of consciousness, and no further complaints with normal vital signs, the patient is cleared to be discharged home. FINAL IMPRESSION     1. Fall, initial encounter    2. Closed head injury, initial encounter    3. Cervicalgia    4. Hyperglycemia          DISPOSITION/PLAN   Discharged    Discharge Note: The patient is stable for discharge home. The signs, symptoms, diagnosis, and discharge instructions have been discussed, understanding conveyed, and agreed upon. The patient is to follow up as recommended or return to ER should their symptoms worsen. PATIENT REFERRED TO:  Follow-up Information       Follow up With Specialties Details Why Contact Info    Lina Chiu MD Family Medicine Schedule an appointment as soon as possible for a visit in 2 days As needed, If symptoms worsen [address] [place] [address]  [phone]                DISCHARGE MEDICATIONS:  Current Discharge Medication List        START taking these medications    Details   meclizine (ANTIVERT) 25 mg tablet Take 1 Tablet by mouth three (3) times daily as needed for Dizziness. Qty: 20 Tablet, Refills: 0  Start date: 1/31/2023      methocarbamoL (ROBAXIN) 500 mg tablet Take 1 Tablet by mouth three (3) times daily as needed for Muscle Spasm(s) or Pain (Cause drowsiness do not take while driving). Qty: 10 Tablet, Refills: 0  Start date: 1/31/2023      acetaminophen (Acetaminophen Extra Strength) 500 mg tablet Take 1 Tablet by mouth every six (6) hours as needed for Pain or Fever.   Qty: 20 Tablet, Refills: 0  Start date: 1/31/2023               DISCONTINUED MEDICATIONS:  Current Discharge Medication List          I am the Primary Clinician of Record: Truett Bloch, NP (electronically signed)    (Please note that parts of this dictation were completed with voice recognition software. Quite often unanticipated grammatical, syntax, homophones, and other interpretive errors are inadvertently transcribed by the computer software. Please disregards these errors.  Please excuse any errors that have escaped final proofreading.)

## 2023-03-11 ENCOUNTER — HOSPITAL ENCOUNTER (INPATIENT)
Age: 68
LOS: 1 days | Discharge: HOME | DRG: 069 | End: 2023-03-12
Attending: STUDENT IN AN ORGANIZED HEALTH CARE EDUCATION/TRAINING PROGRAM | Admitting: HOSPITALIST
Payer: MEDICARE

## 2023-03-11 ENCOUNTER — APPOINTMENT (OUTPATIENT)
Dept: CT IMAGING | Age: 68
DRG: 069 | End: 2023-03-11
Attending: STUDENT IN AN ORGANIZED HEALTH CARE EDUCATION/TRAINING PROGRAM
Payer: MEDICARE

## 2023-03-11 ENCOUNTER — APPOINTMENT (OUTPATIENT)
Dept: GENERAL RADIOLOGY | Age: 68
DRG: 069 | End: 2023-03-11
Attending: STUDENT IN AN ORGANIZED HEALTH CARE EDUCATION/TRAINING PROGRAM
Payer: MEDICARE

## 2023-03-11 DIAGNOSIS — R42 VERTIGO: ICD-10-CM

## 2023-03-11 DIAGNOSIS — I65.01 VERTEBRAL ARTERY STENOSIS, SYMPTOMATIC, WITHOUT INFARCTION, RIGHT: ICD-10-CM

## 2023-03-11 DIAGNOSIS — R41.3 MEMORY DIFFICULTY: ICD-10-CM

## 2023-03-11 DIAGNOSIS — G45.0 VERTEBROBASILAR TIAS: Primary | ICD-10-CM

## 2023-03-11 DIAGNOSIS — I63.9 CEREBROVASCULAR ACCIDENT (CVA), UNSPECIFIED MECHANISM (HCC): ICD-10-CM

## 2023-03-11 PROBLEM — F32.A DEPRESSION: Status: ACTIVE | Noted: 2023-03-11

## 2023-03-11 PROBLEM — E11.9 DM (DIABETES MELLITUS) (HCC): Status: ACTIVE | Noted: 2023-03-11

## 2023-03-11 LAB
ALBUMIN SERPL-MCNC: 3.8 G/DL (ref 3.5–5)
ALBUMIN/GLOB SERPL: 1 (ref 1.1–2.2)
ALP SERPL-CCNC: 76 U/L (ref 45–117)
ALT SERPL-CCNC: 18 U/L (ref 12–78)
ANION GAP SERPL CALC-SCNC: 5 MMOL/L (ref 5–15)
AST SERPL-CCNC: 10 U/L (ref 15–37)
BASOPHILS # BLD: 0 K/UL (ref 0–0.1)
BASOPHILS NFR BLD: 1 % (ref 0–1)
BILIRUB SERPL-MCNC: 0.4 MG/DL (ref 0.2–1)
BUN SERPL-MCNC: 18 MG/DL (ref 6–20)
BUN/CREAT SERPL: 28 (ref 12–20)
CALCIUM SERPL-MCNC: 10.4 MG/DL (ref 8.5–10.1)
CHLORIDE SERPL-SCNC: 102 MMOL/L (ref 97–108)
CO2 SERPL-SCNC: 29 MMOL/L (ref 21–32)
COMMENT, HOLDF: NORMAL
CREAT SERPL-MCNC: 0.65 MG/DL (ref 0.55–1.02)
DIFFERENTIAL METHOD BLD: NORMAL
EOSINOPHIL # BLD: 0.1 K/UL (ref 0–0.4)
EOSINOPHIL NFR BLD: 1 % (ref 0–7)
ERYTHROCYTE [DISTWIDTH] IN BLOOD BY AUTOMATED COUNT: 14 % (ref 11.5–14.5)
GLOBULIN SER CALC-MCNC: 3.9 G/DL (ref 2–4)
GLUCOSE BLD STRIP.AUTO-MCNC: 221 MG/DL (ref 65–117)
GLUCOSE SERPL-MCNC: 239 MG/DL (ref 65–100)
HCT VFR BLD AUTO: 40.6 % (ref 35–47)
HGB BLD-MCNC: 13.9 G/DL (ref 11.5–16)
IMM GRANULOCYTES # BLD AUTO: 0 K/UL (ref 0–0.04)
IMM GRANULOCYTES NFR BLD AUTO: 0 % (ref 0–0.5)
INR PPP: 1 (ref 0.9–1.1)
LYMPHOCYTES # BLD: 1.8 K/UL (ref 0.8–3.5)
LYMPHOCYTES NFR BLD: 22 % (ref 12–49)
MCH RBC QN AUTO: 30.5 PG (ref 26–34)
MCHC RBC AUTO-ENTMCNC: 34.2 G/DL (ref 30–36.5)
MCV RBC AUTO: 89.2 FL (ref 80–99)
MONOCYTES # BLD: 0.5 K/UL (ref 0–1)
MONOCYTES NFR BLD: 5 % (ref 5–13)
NEUTS SEG # BLD: 6 K/UL (ref 1.8–8)
NEUTS SEG NFR BLD: 71 % (ref 32–75)
NRBC # BLD: 0 K/UL (ref 0–0.01)
NRBC BLD-RTO: 0 PER 100 WBC
PLATELET # BLD AUTO: 291 K/UL (ref 150–400)
PMV BLD AUTO: 9.5 FL (ref 8.9–12.9)
POTASSIUM SERPL-SCNC: 4.2 MMOL/L (ref 3.5–5.1)
PROT SERPL-MCNC: 7.7 G/DL (ref 6.4–8.2)
PROTHROMBIN TIME: 10.1 SEC (ref 9–11.1)
RBC # BLD AUTO: 4.55 M/UL (ref 3.8–5.2)
SAMPLES BEING HELD,HOLD: NORMAL
SERVICE CMNT-IMP: ABNORMAL
SODIUM SERPL-SCNC: 136 MMOL/L (ref 136–145)
TROPONIN I SERPL HS-MCNC: 14 NG/L (ref 0–51)
WBC # BLD AUTO: 8.4 K/UL (ref 3.6–11)

## 2023-03-11 PROCEDURE — 65270000029 HC RM PRIVATE

## 2023-03-11 PROCEDURE — 94762 N-INVAS EAR/PLS OXIMTRY CONT: CPT

## 2023-03-11 PROCEDURE — 84484 ASSAY OF TROPONIN QUANT: CPT

## 2023-03-11 PROCEDURE — 4A03X5D MEASUREMENT OF ARTERIAL FLOW, INTRACRANIAL, EXTERNAL APPROACH: ICD-10-PCS | Performed by: INTERNAL MEDICINE

## 2023-03-11 PROCEDURE — 82962 GLUCOSE BLOOD TEST: CPT

## 2023-03-11 PROCEDURE — 96372 THER/PROPH/DIAG INJ SC/IM: CPT

## 2023-03-11 PROCEDURE — 85610 PROTHROMBIN TIME: CPT

## 2023-03-11 PROCEDURE — 65270000046 HC RM TELEMETRY

## 2023-03-11 PROCEDURE — 0042T CT CODE NEURO PERF W CBF: CPT

## 2023-03-11 PROCEDURE — 99285 EMERGENCY DEPT VISIT HI MDM: CPT

## 2023-03-11 PROCEDURE — G0378 HOSPITAL OBSERVATION PER HR: HCPCS

## 2023-03-11 PROCEDURE — 36415 COLL VENOUS BLD VENIPUNCTURE: CPT

## 2023-03-11 PROCEDURE — 74011000636 HC RX REV CODE- 636: Performed by: RADIOLOGY

## 2023-03-11 PROCEDURE — 94761 N-INVAS EAR/PLS OXIMETRY MLT: CPT

## 2023-03-11 PROCEDURE — 74011250636 HC RX REV CODE- 250/636: Performed by: HOSPITALIST

## 2023-03-11 PROCEDURE — 93005 ELECTROCARDIOGRAM TRACING: CPT

## 2023-03-11 PROCEDURE — 71045 X-RAY EXAM CHEST 1 VIEW: CPT

## 2023-03-11 PROCEDURE — 80053 COMPREHEN METABOLIC PANEL: CPT

## 2023-03-11 PROCEDURE — 70496 CT ANGIOGRAPHY HEAD: CPT

## 2023-03-11 PROCEDURE — 85025 COMPLETE CBC W/AUTO DIFF WBC: CPT

## 2023-03-11 PROCEDURE — 70450 CT HEAD/BRAIN W/O DYE: CPT

## 2023-03-11 RX ORDER — ACETAMINOPHEN 325 MG/1
650 TABLET ORAL
Status: DISCONTINUED | OUTPATIENT
Start: 2023-03-11 | End: 2023-03-12 | Stop reason: HOSPADM

## 2023-03-11 RX ORDER — PAROXETINE HYDROCHLORIDE 20 MG/1
40 TABLET, FILM COATED ORAL DAILY
Status: DISCONTINUED | OUTPATIENT
Start: 2023-03-11 | End: 2023-03-12 | Stop reason: HOSPADM

## 2023-03-11 RX ORDER — METHOCARBAMOL 500 MG/1
500 TABLET, FILM COATED ORAL
Status: DISCONTINUED | OUTPATIENT
Start: 2023-03-11 | End: 2023-03-12 | Stop reason: HOSPADM

## 2023-03-11 RX ORDER — PAROXETINE HYDROCHLORIDE 20 MG/1
10 TABLET, FILM COATED ORAL DAILY
Status: DISCONTINUED | OUTPATIENT
Start: 2023-03-12 | End: 2023-03-11

## 2023-03-11 RX ORDER — DIPHENHYDRAMINE HCL 25 MG
25 CAPSULE ORAL
Status: DISCONTINUED | OUTPATIENT
Start: 2023-03-11 | End: 2023-03-12 | Stop reason: HOSPADM

## 2023-03-11 RX ORDER — CETIRIZINE HYDROCHLORIDE 10 MG/1
10 TABLET ORAL DAILY
Status: DISCONTINUED | OUTPATIENT
Start: 2023-03-12 | End: 2023-03-12 | Stop reason: HOSPADM

## 2023-03-11 RX ORDER — DEXTROSE MONOHYDRATE 100 MG/ML
0-250 INJECTION, SOLUTION INTRAVENOUS AS NEEDED
Status: DISCONTINUED | OUTPATIENT
Start: 2023-03-11 | End: 2023-03-12 | Stop reason: HOSPADM

## 2023-03-11 RX ORDER — IBUPROFEN 200 MG
4 TABLET ORAL AS NEEDED
Status: DISCONTINUED | OUTPATIENT
Start: 2023-03-11 | End: 2023-03-12 | Stop reason: HOSPADM

## 2023-03-11 RX ORDER — INSULIN LISPRO 100 [IU]/ML
INJECTION, SOLUTION INTRAVENOUS; SUBCUTANEOUS
Status: DISCONTINUED | OUTPATIENT
Start: 2023-03-12 | End: 2023-03-12 | Stop reason: HOSPADM

## 2023-03-11 RX ORDER — GUAIFENESIN 100 MG/5ML
81 LIQUID (ML) ORAL DAILY
Status: DISCONTINUED | OUTPATIENT
Start: 2023-03-12 | End: 2023-03-12 | Stop reason: HOSPADM

## 2023-03-11 RX ORDER — MECLIZINE HYDROCHLORIDE 25 MG/1
25 TABLET ORAL
Status: DISCONTINUED | OUTPATIENT
Start: 2023-03-11 | End: 2023-03-12 | Stop reason: HOSPADM

## 2023-03-11 RX ORDER — ACETAMINOPHEN 500 MG
500 TABLET ORAL
Status: DISCONTINUED | OUTPATIENT
Start: 2023-03-11 | End: 2023-03-12 | Stop reason: HOSPADM

## 2023-03-11 RX ORDER — ACETAMINOPHEN 650 MG/1
650 SUPPOSITORY RECTAL
Status: DISCONTINUED | OUTPATIENT
Start: 2023-03-11 | End: 2023-03-12 | Stop reason: HOSPADM

## 2023-03-11 RX ORDER — ENOXAPARIN SODIUM 100 MG/ML
40 INJECTION SUBCUTANEOUS EVERY 24 HOURS
Status: DISCONTINUED | OUTPATIENT
Start: 2023-03-11 | End: 2023-03-12 | Stop reason: HOSPADM

## 2023-03-11 RX ADMIN — IOPAMIDOL 140 ML: 755 INJECTION, SOLUTION INTRAVENOUS at 18:10

## 2023-03-11 RX ADMIN — ENOXAPARIN SODIUM 40 MG: 100 INJECTION SUBCUTANEOUS at 23:07

## 2023-03-11 NOTE — ED PROVIDER NOTES
Patient is a 79 yo F who woke up at 3am with dizziness and felt drunk. No Chest pain, SOB. Having N/V with dizziness. Never had symptoms like this before. Patient went to bed at 1am. Blood pressure is usually normal. Does have a hx of DM. Level 2 called for posterior circ/cerebellar symptoms. Right sided chest and neck pain for 2-3 weeks. The history is provided by the patient. Past Medical History:   Diagnosis Date    Diabetes [facility])     Psychiatric disorder     depression       No past surgical history on file. No family history on file. Social History     Socioeconomic History    Marital status: SINGLE     Spouse name: Not on file    Number of children: Not on file    Years of education: Not on file    Highest education level: Not on file   Occupational History    Not on file   Tobacco Use    Smoking status: Former    Smokeless tobacco: Not on file   Substance and Sexual Activity    Alcohol use: Not Currently     Comment: rarely    Drug use: No    Sexual activity: Not on file   Other Topics Concern    Not on file   Social History Narrative    Not on file     Social Determinants of Health     Financial Resource Strain: Not on file   Food Insecurity: Not on file   Transportation Needs: Not on file   Physical Activity: Not on file   Stress: Not on file   Social Connections: Not on file   Intimate Partner Violence: Not on file   Housing Stability: Not on file         ALLERGIES: Patient has no known allergies. Review of Systems   Constitutional:  Positive for activity change and fatigue. Negative for appetite change and fever. Eyes:  Positive for visual disturbance. Respiratory:  Negative for shortness of breath. Cardiovascular:  Positive for chest pain. Musculoskeletal:  Positive for gait problem. Neurological:  Positive for dizziness.      Vitals:    03/11/23 1747 03/11/23 1827 03/11/23 1830   BP: 138/83  (!) 142/96   Pulse: 94 94 95   Resp: 20  18   Temp: 97.9 °F (36.6 °C)     SpO2: 100%  98%   Weight: 87.1 kg (192 lb)     Height: 5' 7\" (1.702 m)              Physical Exam  Vitals and nursing note reviewed. Constitutional:       Appearance: Normal appearance. HENT:      Head: Normocephalic and atraumatic. Right Ear: External ear normal.      Left Ear: External ear normal.   Eyes:      Conjunctiva/sclera: Conjunctivae normal.   Cardiovascular:      Rate and Rhythm: Normal rate and regular rhythm. Pulses: Normal pulses. Heart sounds: Normal heart sounds. Pulmonary:      Effort: Pulmonary effort is normal.      Breath sounds: Normal breath sounds. Chest:      Chest wall: No deformity or tenderness. Abdominal:      Palpations: Abdomen is soft. Tenderness: There is no abdominal tenderness. Musculoskeletal:         General: No deformity or signs of injury. Normal range of motion. Skin:     General: Skin is warm and dry. Coloration: Skin is not jaundiced. Neurological:      Mental Status: She is alert and oriented to person, place, and time. Gait: Gait abnormal.   Psychiatric:         Mood and Affect: Mood normal.         Behavior: Behavior normal.        Medical Decision Making  Amount and/or Complexity of Data Reviewed  Labs: ordered. Decision-making details documented in ED Course. Radiology: ordered and independent interpretation performed. ECG/medicine tests: ordered and independent interpretation performed. Decision-making details documented in ED Course. Discussion of management or test interpretation with external provider(s): Discussed patient with teleneurology and neuro interventional radiology    Risk  Prescription drug management. Decision regarding hospitalization. ED Course as of 03/11/23 1918   Sat Mar 11, 2023   [address]): 221 [AL]   1917 EKG interpretation:   Rhythm: normal sinus rhythm; and regular .  Rate (approx.): 90 inferior infarct, age undetermined; Axis: normal; Intervals: normal ; ST/T wave: normal; EKG documented and interpreted by Doris Valdes. Joseph Alaniz MD, Emergency Medicine.     [AL]   1464 Creatinine: 0.65 [AL]   1917 WBC: 8.4 [AL]   1917 HGB: 13.9 [AL]   1917 Troponin-High Sensitivity: 14 [AL]      ED Course User Index  [AL] Erin Valerio MD     LABORATORY RESULTS:  Labs Reviewed   GLUCOSE, POC - Abnormal; Notable for the following components:       Result Value    Glucose (POC) 221 (*)     All other components within normal limits   CBC WITH AUTOMATED DIFF   PROTHROMBIN TIME + INR   TROPONIN-HIGH SENSITIVITY   SAMPLES BEING HELD   METABOLIC PANEL, COMPREHENSIVE       IMAGING RESULTS:  XR CHEST PORT   Final Result   No acute cardiopulmonary disease. CTA CODE NEURO HEAD AND NECK W CONT   Final Result   Small area of potential early reversible ischemia in the right superior   cerebellum and right occipital lobe. No concordant area of gray-white differentiation loss. Multifocal moderate to severe stenoses of the posterior communicating artery and   posterior cerebral artery on the right. Moderate atherosclerotic plaque of the common carotid artery on the right with   mild stenosis of the proximal right ICA. There is no major vessel occlusion. There is no aneurysm or dissection identified. .             CT CODE NEURO PERF W CBF   Final Result   Small area of potential early reversible ischemia in the right superior   cerebellum and right occipital lobe. No concordant area of gray-white differentiation loss. Multifocal moderate to severe stenoses of the posterior communicating artery and   posterior cerebral artery on the right. Moderate atherosclerotic plaque of the common carotid artery on the right with   mild stenosis of the proximal right ICA. There is no major vessel occlusion. There is no aneurysm or dissection identified. .             CT CODE NEURO HEAD WO CONTRAST   Final Result   1. No evidence of acute intracranial abnormality.    2. Andi [place] secretions in the right maxillary sinus suspicious for acute   sinusitis. MEDICATIONS GIVEN:  Medications   iopamidoL (ISOVUE-370) 370 mg iodine /mL (76 %) injection 140 mL (140 mL IntraVENous Given 3/11/23 1810)       Differential diagnosis: Vertigo, stroke, ACS, noncardiac chest pain    ED physician interpretation of imaging: CT head without acute bleeds  ED physician interpretation of EKG: No STEMI. See my interpretation EKG in ED course above. ED physician interpretation of laboratory results: Troponin 14, normal limits, ACS unlikely with several days of intermittent chest pain. Hyperglycemia without DKA. MDM: Patient is a 27-year-old female presented ED after waking up at 3:30 AM with severe dizziness, nausea vomiting. Patient is also had some difficulty ambulating with some ataxia and possible some vision changes. Tier 2 code stroke called. Patient evaluated teleneurology. Recommend routine stroke admission. Based on CT read of possible reversible ischemia I contacted neuro interventional radiology. They stated no reversible causes were present/no LVO and no treatments indicated at this time. Hospital service contacted and patient admitted for further treatment and evaluation. Dr. More Lainez signing patient out to night doc. DISPOSITION: Admitted    Perfect Serve Consult for Admission  6:47 PM    ED Room Number: ER04/04  Patient Name and age:  Milo Mar 78 y.o.  female  Working Diagnosis:   1. Cerebrovascular accident (CVA), unspecified mechanism ([address]        COVID-19 Suspicion:  no  Sepsis present:  no  Reassessment needed: no  Code Status:  Full Code  Readmission: no  Isolation Requirements:  no  Recommended Level of Care:  telemetry  Department: [facility] ED - [phone]    Other: Patient is a 27-year-old female presenting with dizziness and some difficulty walking. No LVO on CT scans.   Radiology calls possible reversible ischemia, I discussed patient with neuro IR  [place] Pancoast. No LVO no intervention is needed. Teleneurology recommends routine stroke work-up. Ken Roberto.  Rubia Rodriguez MD      Procedures

## 2023-03-11 NOTE — ED TRIAGE NOTES
Pt reports she woke up to go to restroom at 3am this morning, she had episode of dizziness. Pt reports vomiting and nausea. Denies visual changes    Pt reports unsteady gait.      Signs and symptoms: dizziness   Code Stroke activation time: 1750  Provider at bedside time:  1750  VAN score: Negative  Last Known Well (Time): 0100 am  Blood Glucose Result/Time: 221 @1752   Blood Pressure: 138/83  Anticoagulants (List medications): n/a

## 2023-03-12 ENCOUNTER — APPOINTMENT (OUTPATIENT)
Dept: MRI IMAGING | Age: 68
DRG: 069 | End: 2023-03-12
Attending: HOSPITALIST
Payer: MEDICARE

## 2023-03-12 ENCOUNTER — APPOINTMENT (OUTPATIENT)
Dept: NON INVASIVE DIAGNOSTICS | Age: 68
DRG: 069 | End: 2023-03-12
Attending: HOSPITALIST
Payer: MEDICARE

## 2023-03-12 VITALS
RESPIRATION RATE: 16 BRPM | BODY MASS INDEX: 30.13 KG/M2 | WEIGHT: 192 LBS | TEMPERATURE: 98.2 F | HEIGHT: 67 IN | HEART RATE: 76 BPM | DIASTOLIC BLOOD PRESSURE: 78 MMHG | OXYGEN SATURATION: 97 % | SYSTOLIC BLOOD PRESSURE: 134 MMHG

## 2023-03-12 PROBLEM — E11.9 DM (DIABETES MELLITUS), TYPE 2 (HCC): Status: ACTIVE | Noted: 2023-03-11

## 2023-03-12 PROBLEM — I63.9 ACUTE CVA (CEREBROVASCULAR ACCIDENT) (HCC): Status: ACTIVE | Noted: 2023-03-11

## 2023-03-12 PROBLEM — R42 VERTIGO: Status: ACTIVE | Noted: 2023-03-12

## 2023-03-12 PROBLEM — G45.0 VERTEBROBASILAR INSUFFICIENCY: Status: ACTIVE | Noted: 2023-03-12

## 2023-03-12 PROBLEM — G45.9 TIA (TRANSIENT ISCHEMIC ATTACK): Status: ACTIVE | Noted: 2023-03-12

## 2023-03-12 LAB
CHOLEST SERPL-MCNC: 168 MG/DL
EST. AVERAGE GLUCOSE BLD GHB EST-MCNC: 200 MG/DL
GLUCOSE BLD STRIP.AUTO-MCNC: 200 MG/DL (ref 65–117)
GLUCOSE BLD STRIP.AUTO-MCNC: 245 MG/DL (ref 65–117)
GLUCOSE BLD STRIP.AUTO-MCNC: 284 MG/DL (ref 65–117)
HBA1C MFR BLD: 8.6 % (ref 4–5.6)
HDLC SERPL-MCNC: 43 MG/DL
HDLC SERPL: 3.9 (ref 0–5)
LDLC SERPL CALC-MCNC: 68.2 MG/DL (ref 0–100)
SERVICE CMNT-IMP: ABNORMAL
TRIGL SERPL-MCNC: 284 MG/DL (ref ?–150)
TSH SERPL DL<=0.05 MIU/L-ACNC: 1.91 UIU/ML (ref 0.36–3.74)
VLDLC SERPL CALC-MCNC: 56.8 MG/DL

## 2023-03-12 PROCEDURE — 97535 SELF CARE MNGMENT TRAINING: CPT

## 2023-03-12 PROCEDURE — G0378 HOSPITAL OBSERVATION PER HR: HCPCS

## 2023-03-12 PROCEDURE — 36415 COLL VENOUS BLD VENIPUNCTURE: CPT

## 2023-03-12 PROCEDURE — 99223 1ST HOSP IP/OBS HIGH 75: CPT | Performed by: PSYCHIATRY & NEUROLOGY

## 2023-03-12 PROCEDURE — 80061 LIPID PANEL: CPT

## 2023-03-12 PROCEDURE — 97161 PT EVAL LOW COMPLEX 20 MIN: CPT

## 2023-03-12 PROCEDURE — 82607 VITAMIN B-12: CPT

## 2023-03-12 PROCEDURE — 97165 OT EVAL LOW COMPLEX 30 MIN: CPT

## 2023-03-12 PROCEDURE — 74011636637 HC RX REV CODE- 636/637: Performed by: INTERNAL MEDICINE

## 2023-03-12 PROCEDURE — 93308 TTE F-UP OR LMTD: CPT

## 2023-03-12 PROCEDURE — 74011250637 HC RX REV CODE- 250/637: Performed by: HOSPITALIST

## 2023-03-12 PROCEDURE — 84443 ASSAY THYROID STIM HORMONE: CPT

## 2023-03-12 PROCEDURE — 82746 ASSAY OF FOLIC ACID SERUM: CPT

## 2023-03-12 PROCEDURE — 74011250637 HC RX REV CODE- 250/637: Performed by: PSYCHIATRY & NEUROLOGY

## 2023-03-12 PROCEDURE — 74011250636 HC RX REV CODE- 250/636: Performed by: HOSPITALIST

## 2023-03-12 PROCEDURE — 97530 THERAPEUTIC ACTIVITIES: CPT

## 2023-03-12 PROCEDURE — 97116 GAIT TRAINING THERAPY: CPT

## 2023-03-12 PROCEDURE — 83036 HEMOGLOBIN GLYCOSYLATED A1C: CPT

## 2023-03-12 PROCEDURE — 82962 GLUCOSE BLOOD TEST: CPT

## 2023-03-12 PROCEDURE — 74011636637 HC RX REV CODE- 636/637: Performed by: HOSPITALIST

## 2023-03-12 PROCEDURE — 70551 MRI BRAIN STEM W/O DYE: CPT

## 2023-03-12 RX ORDER — ATORVASTATIN CALCIUM 20 MG/1
10 TABLET, FILM COATED ORAL DAILY
Status: DISCONTINUED | OUTPATIENT
Start: 2023-03-13 | End: 2023-03-12 | Stop reason: HOSPADM

## 2023-03-12 RX ORDER — CLOPIDOGREL BISULFATE 75 MG/1
75 TABLET ORAL DAILY
Qty: 30 TABLET | Refills: 0 | Status: SHIPPED | OUTPATIENT
Start: 2023-03-13 | End: 2023-04-12

## 2023-03-12 RX ORDER — INSULIN GLARGINE 100 [IU]/ML
0.2 INJECTION, SOLUTION SUBCUTANEOUS DAILY
Status: DISCONTINUED | OUTPATIENT
Start: 2023-03-13 | End: 2023-03-12 | Stop reason: HOSPADM

## 2023-03-12 RX ORDER — CLOPIDOGREL BISULFATE 75 MG/1
75 TABLET ORAL DAILY
Status: DISCONTINUED | OUTPATIENT
Start: 2023-03-12 | End: 2023-03-12 | Stop reason: HOSPADM

## 2023-03-12 RX ORDER — GUAIFENESIN 100 MG/5ML
81 LIQUID (ML) ORAL DAILY
Qty: 30 TABLET | Refills: 1 | Status: SHIPPED | OUTPATIENT
Start: 2023-03-13 | End: 2023-04-12

## 2023-03-12 RX ORDER — ATORVASTATIN CALCIUM 10 MG/1
10 TABLET, FILM COATED ORAL DAILY
Qty: 30 TABLET | Refills: 1 | Status: SHIPPED | OUTPATIENT
Start: 2023-03-13 | End: 2023-04-12

## 2023-03-12 RX ORDER — INSULIN GLARGINE 100 [IU]/ML
8 INJECTION, SOLUTION SUBCUTANEOUS ONCE
Status: DISCONTINUED | OUTPATIENT
Start: 2023-03-12 | End: 2023-03-12 | Stop reason: HOSPADM

## 2023-03-12 RX ORDER — INSULIN GLARGINE 100 [IU]/ML
0.1 INJECTION, SOLUTION SUBCUTANEOUS DAILY
Status: DISCONTINUED | OUTPATIENT
Start: 2023-03-12 | End: 2023-03-12

## 2023-03-12 RX ADMIN — CLOPIDOGREL BISULFATE 75 MG: 75 TABLET ORAL at 14:01

## 2023-03-12 RX ADMIN — PAROXETINE HYDROCHLORIDE 40 MG: 20 TABLET, FILM COATED ORAL at 00:07

## 2023-03-12 RX ADMIN — MECLIZINE HYDROCHLORIDE 25 MG: 25 TABLET ORAL at 08:06

## 2023-03-12 RX ADMIN — INSULIN LISPRO 3 UNITS: 100 INJECTION, SOLUTION INTRAVENOUS; SUBCUTANEOUS at 11:30

## 2023-03-12 RX ADMIN — INSULIN LISPRO 5 UNITS: 100 INJECTION, SOLUTION INTRAVENOUS; SUBCUTANEOUS at 17:51

## 2023-03-12 RX ADMIN — CETIRIZINE HYDROCHLORIDE 10 MG: 10 TABLET, FILM COATED ORAL at 08:07

## 2023-03-12 RX ADMIN — INSULIN LISPRO 3 UNITS: 100 INJECTION, SOLUTION INTRAVENOUS; SUBCUTANEOUS at 08:07

## 2023-03-12 RX ADMIN — ASPIRIN 81 MG: 81 TABLET, CHEWABLE ORAL at 08:06

## 2023-03-12 RX ADMIN — INSULIN GLARGINE 9 UNITS: 100 INJECTION, SOLUTION SUBCUTANEOUS at 11:30

## 2023-03-12 NOTE — ED NOTES
Echo is still here and going to perform test. Plan to get read today and d/c patient. Will hold on transferring upstairs.

## 2023-03-12 NOTE — PROGRESS NOTES
OCCUPATIONAL THERAPY EVALUATION/DISCHARGE  Patient: Nate López (74 y.o. female)  Date: 3/12/2023  Primary Diagnosis: CVA (cerebral vascular accident) [facility]) [I63.9]       Precautions:  Fall    ASSESSMENT  Based on the objective data described below, the patient presents with good overall activity tolerance following admission for CVA r/o with c/o dizziness. Patient is undergoing neurological work-up; CTA showing small area of potential ischemia and MRI negative for acute process. Patient performed transfers and ADLs without LOB or physical assistance needed. Patient was educated on signs and symptoms of stroke (BEFAST), stroke risk factors, and general home safety. Patient has no further skilled OT needs and cleared from OT services at this time. Current Level of Function (ADLs/self-care): Functional mobility, independent-supervision; ADLs, independent-mod I     Functional Outcome Measure: The patient scored 66/66 on the Fugl-Bartlett Assessment of Upper Extremity outcome measure. PLAN :  Recommendation for discharge: (in order for the patient to meet his/her long term goals)  No skilled occupational therapy/ follow up rehabilitation needs identified at this time. This discharge recommendation:  Has been made in collaboration with the attending provider and/or case management    IF patient discharges home will need the following DME: none       SUBJECTIVE:   Patient agreeable to OT evaluation. OBJECTIVE DATA SUMMARY:   HISTORY:   Past Medical History:   Diagnosis Date    Diabetes ([address]     Psychiatric disorder     depression   No past surgical history on file. Prior Level of Function/Environment/Context: Patient lives with her family.     Expanded or extensive additional review of patient history:   Home Situation  Home Environment: Private residence  # Steps to Enter: 3  Rails to Enter: Yes  Wheelchair Ramp: No  One/Two Story Residence: One story  Living Alone: No  Support Systems: Child(zachariah), Other Family Member(s)  Patient Expects to be Discharged to[de-identified] Home  Current DME Used/Available at Home: None    Hand dominance: Right    EXAMINATION OF PERFORMANCE DEFICITS:  Cognitive/Behavioral Status:  Neurologic State: Alert  Orientation Level: Oriented X4  Cognition: Follows commands  Perception: Appears intact  Perseveration: No perseveration noted  Safety/Judgement: Awareness of environment    Skin: Intact in the uppers    Edema: None noted in the uppers    Vision/Perceptual:    Tracking: Able to track stimulus in all quadrants w/o difficulty    Saccades: Additional eye shifts occurred during testing    Diplopia: No    Acuity: Impaired far vision       Range of Motion:  AROM: Within functional limits  PROM: Within functional limits     Strength:  WDL in the uppers    Coordination:  Fine Motor Skills-Upper: Left Intact; Right Intact    Gross Motor Skills-Upper: Left Intact; Right Intact    Tone & Sensation:  Tone: Normal  Sensation: Intact       Balance:  Sitting: Impaired; With support  Sitting - Static: Fair (occasional)  Sitting - Dynamic: Fair (occasional)  Standing: Impaired  Standing - Static: Fair  Standing - Dynamic : Poor    Functional Mobility and Transfers for ADLs:  Bed Mobility:  Rolling: Independent  Supine to Sit: Independent  Sit to Supine: Independent  Scooting: Independent    Transfers:  Sit to Stand: Supervision  Stand to Sit: Supervision  Bed to Chair: Supervision  Bathroom Mobility: Supervision/set up  Toilet Transfer : Supervision    ADL Assessment:  Feeding: Independent    Oral Facial Hygiene/Grooming: Independent    Bathing: Modified independent     Upper Body Dressing: Independent    Lower Body Dressing: Modified independent    Toileting: Modified independent      Cognitive Retraining  Safety/Judgement: Awareness of environment    Functional Measure:  Fugl-Bartlett Assessment of Motor Recovery after Stroke:          Reflex Activity  Flexors/Biceps/Fingers: Can be elicited  Extensors/Triceps: Can be elicited  Reflex Subtotal: 4    Volitional Movement Within Synergies  Shoulder Retraction: Full  Shoulder Elevation: Full  Shoulder Abduction (90 degrees): Full  Shoulder External Rotation: Full  Elbow Flexion: Full  Forearm Supination: Full  Shoulder Adduction/Internal Rotation: Full  Elbow Extension: Full  Forearm Pronation: Full  Subtotal: 18    Volitional Movement Mixing Synergies  Hand to Lumbar Spine: Full  Shoulder Flexion (0-90 degrees): Full  Pronation-Supination: Full  Subtotal: 6    Volitional Movement With Little or No Synergy  Shoulder Abduction (0-90 degrees): Full  Shoulder Flexion ( degrees): Full  Pronation/Supination: Full  Subtotal : 6    Normal Reflex Activity  Biceps, Triceps, Finger Flexors: Full  Subtotal : 2    Upper Extremity Total   Upper Extremity Total: 36    Wrist  Stability at 15 Degree Dorsiflexion: Full  Repeated Dorsiflexion/ Volar Flexion: Full  Stability at 15 Degree Dorsiflexion: Full  Repeated Dorsiflexion/ Volar Flexion: Full  Circumduction: Full  Wrist Total: 10    Hand  Mass Flexion: Full  Mass Extension: Full  Grasp A: Full  Grasp B: Full  Grasp C: Full  Grasp D: Full  Grasp E: Full  Hand Total: 14    Coordination/Speed  Tremor: None  Dysmetria: None  Time: <1s  Coordination/Speed Total : 6    Total A-D  Total A-D (Motor Function): 66/66         This is a reliable/valid measure of arm function after a neurological event. It has established value to characterize functional status and for measuring spontaneous and therapy-induced recovery; tests proximal and distal motor functions. Fugl-Bartlett Assessment - UE scores recorded between five and 30 days post neurologic event can be used to predict UE recovery at six months post neurologic event. Severe = 0-21 points   Moderately Severe = 22-33 points   Moderate = 34-47 points   Mild = 48-66 points  REJI Gutiérrez, PANKAJ Orr, & AIXA Velásquez (1992).  Measurement of motor recovery after stroke: Outcome assessment and sample size requirements. Stroke, 23, pp. 6957-1392.   --------------------------------------------------------------------------------------------------------------------------------------------------------------------  MCID:  Stroke:   Arik Barbosa et al, 2001; n = 171; mean age 79 (6) years; assessed within 16 (12) days of stroke, Acute Stroke)  FMA Motor Scores from Admission to Discharge   10 point increase in FMA Upper Extremity = 1.5 change in discharge FIM   10 point increase in FMA Lower Extremity = 1.9 change in discharge FIM  MDC:   Stroke:   Virginia Ku et al, 2008, n = 14, mean age = 59.9 (14.6) years, assessed on average 14 (6.5) months post stroke, Chronic Stroke)   FMA = 5.2 points for the Upper Extremity portion of the assessment        Occupational Therapy Evaluation Charge Determination   History Examination Decision-Making   LOW Complexity : Brief history review  LOW Complexity : 1-3 performance deficits relating to physical, cognitive , or psychosocial skils that result in activity limitations and / or participation restrictions  LOW Complexity : No comorbidities that affect functional and no verbal or physical assistance needed to complete eval tasks       Based on the above components, the patient evaluation is determined to be of the following complexity level: LOW     Activity Tolerance:   Good    After treatment patient left in no apparent distress:    Supine in bed, Call bell within reach, and Bed / chair alarm activated    COMMUNICATION/EDUCATION:   The patients plan of care was discussed with: Physical therapist, Registered nurse, and patient .      Thank you for this referral.  LAYLA Perez/STEVEN  Time Calculation: 42 mins

## 2023-03-12 NOTE — ED NOTES
Pt to bathroom via wc with this nurse, pt reported dizziness when she would turn fast getting out of bed or in/out of the wheelchair otherwise tolerated well, back to bed on CM, call bell in reach

## 2023-03-12 NOTE — PROGRESS NOTES
[facility]  [address]  [phone]      Hospitalist  Progress Note      NAME:         Kevin Barrientos   :        1955  MRM:        [id number]    Date of service: 3/12/2023      Subjective: Patient seen and examined by me. Patient admitted with vertigo concerning for an acute CVA. Symptoms worse with changes in position. No focal weakness. No nausea or vomiting. No tinnitus      Objective:    Vital Signs:    Visit Vitals  /76   Pulse 82   Temp 97.7 °F (36.5 °C)   Resp 14   Ht 5' 7\" (1.702 m)   Wt 87.1 kg (192 lb)   SpO2 91%   BMI 30.07 kg/m²      No intake or output data in the 24 hours ending 23     Current inpatient medications reviewed:  Current Facility-Administered Medications   Medication Dose Route Frequency    acetaminophen (TYLENOL) tablet 500 mg  500 mg Oral Q6H PRN    cetirizine (ZYRTEC) tablet 10 mg  10 mg Oral DAILY    diphenhydrAMINE (BENADRYL) capsule 25 mg  25 mg Oral Q6H PRN    meclizine (ANTIVERT) tablet 25 mg  25 mg Oral TID PRN    methocarbamoL (ROBAXIN) tablet 500 mg  500 mg Oral TID PRN    glucose chewable tablet 16 g  4 Tablet Oral PRN    glucagon (GLUCAGEN) injection 1 mg  1 mg IntraMUSCular PRN    acetaminophen (TYLENOL) tablet 650 mg  650 mg Oral Q4H PRN    Or    acetaminophen (TYLENOL) solution 650 mg  650 mg Per NG tube Q4H PRN    Or    acetaminophen (TYLENOL) suppository 650 mg  650 mg Rectal Q4H PRN    dextrose 10% infusion 0-250 mL  0-250 mL IntraVENous PRN    insulin lispro (HUMALOG) injection   SubCUTAneous AC&HS    aspirin chewable tablet 81 mg  81 mg Oral DAILY    enoxaparin (LOVENOX) injection 40 mg  40 mg SubCUTAneous Q24H    PARoxetine (PAXIL) tablet 40 mg  40 mg Oral DAILY     Current Outpatient Medications   Medication Sig    glimepiride (AMARYL) 4 mg tablet Take 1 Tablet by mouth every morning.     metFORMIN (GLUCOPHAGE) 1,000 mg tablet Take 1,000 mg by mouth two (2) times daily (with meals). Indications: TYPE 2 DIABETES MELLITUS    PARoxetine (PAXIL) 10 mg tablet Take  by mouth daily. Indications: DEPRESSION    meclizine (ANTIVERT) 25 mg tablet Take 1 Tablet by mouth three (3) times daily as needed for Dizziness. (Patient not taking: Reported on 3/11/2023)    methocarbamoL (ROBAXIN) 500 mg tablet Take 1 Tablet by mouth three (3) times daily as needed for Muscle Spasm(s) or Pain (Cause drowsiness do not take while driving). (Patient not taking: Reported on 3/11/2023)    acetaminophen (Acetaminophen Extra Strength) 500 mg tablet Take 1 Tablet by mouth every six (6) hours as needed for Pain or Fever. cetirizine (ZYRTEC) 10 mg tablet Take 1 Tablet by mouth daily. (Patient not taking: Reported on 3/11/2023)    fluticasone propionate (FLONASE) 50 mcg/actuation nasal spray 2 Sprays by Both Nostrils route daily. (Patient not taking: Reported on 3/11/2023)    diphenhydrAMINE (BenadryL) 25 mg capsule Take 1 Capsule by mouth every six (6) hours as needed for Itching. (Patient not taking: Reported on 3/11/2023)    traMADol-acetaminophen (ULTRACET) 37.5-325 mg per tablet Take 1 Tab by mouth every six (6) hours as needed for Pain. Max Daily Amount: 4 Tabs. (Patient not taking: Reported on 3/11/2023)       Physical Examination:    General:   Weak and ill looking but not in any distress   Eyes:   pink conjunctivae, PERRLA with no discharge. ENT:   no ottorrhea or rhinorrhea with dry mucous membranes  Neck: no masses, thyroid non-tender and trachea central.  Pulm:  clear breath sounds without crackles or wheezes  Card:  no JVD or murmurs, has regular and normal S1, S2 without thrills, bruits or peripheral edema  Abd:  Soft, non-tender, non-distended, normoactive bowel sounds with no palpable organomegaly  Musc:  No cyanosis, clubbing, atrophy or deformities. Skin:  No rashes, bruising or ulcers. Neuro: Awake and alert. No facial droop. ?nystagmus. Moves all extremities  Psych:  Has a good insight and is oriented x 3    Diagnostic testing:    Laboratory data reviewed and independently interpreted:    Recent Labs     03/11/23  1755   WBC 8.4   HGB 13.9   HCT 40.6        Recent Labs     03/11/23  1755      K 4.2      CO2 29   *   BUN 18   CREA 0.65   CA 10.4*   ALB 3.8   ALT 18   INR 1.0     No components found for: [place]    Imaging studies reviewed and reports noted, Telemetry reviewed and independently interpreted by me - all reviewed in [facility]     Assessment and Plan:    Vertigo (3/12/2023) POA: admitted with a suspected CVA but symptoms concerning for BPPV. CT code neuro showed no acute changes. CTA head and neck showed a a small area of potential early reversible ischemia in the right superior cerebellum and right occipital lobe. No concordant area of gray-white differentiation loss. Multifocal moderate to severe stenoses of the posterior communicating artery and posterior cerebral artery on the right. Moderate atherosclerotic plaque of the common carotid artery on the right with mild stenosis of the proximal right ICA. MRI showed chronic white matter disease with no acute infarction. Follow Echocardiogram, lipid panel. Continue Asprin. Consult neurology, PT    DM (diabetes mellitus), type 2 ([address] (3/11/2023) POA: check A1c. BG remains elevated. On home Amaryl. Start Basal Lantus 0.1units/kg. SSi per protocol for now. DM diet    Depression (3/11/2023) POA: continue Prozac    Acute sinusitis POA: Continue Cetrizine    Care Plan discussed with: Patient, Nursing. Prophylaxis:  Lovenox                Anticipated Disposition:   PT, OT vs other    PCP:      Sage Dey MD     I have personally examined and treated the patient at bedside during this period.  To assist coordination of care and communication with nursing and staff, this note may be preliminary early in the day, but finalized by end of the day.         ___________________________________________________    Attending Physician:   Felisha Espana MD

## 2023-03-12 NOTE — PROGRESS NOTES
Problem: Mobility Impaired (Adult and Pediatric)  Goal: *Acute Goals and Plan of Care (Insert Text)  Description: FUNCTIONAL STATUS PRIOR TO ADMISSION: Patient was independent and active without use of DME.    HOME SUPPORT PRIOR TO ADMISSION: The patient lived with son and granddaughter but did not require assist.    Physical Therapy Goals  Initiated 3/12/2023  1. Patient will transfer from bed to chair and chair to bed with independence using the least restrictive device within 7 day(s). 2.  Patient will perform sit to stand with independence within 7 day(s). 3.  Patient will ambulate with independence for 200 feet with the least restrictive device within 7 day(s). 4.  Patient will ascend/descend 3 stairs with no handrail(s) with independence within 7 day(s). Outcome: Progressing Towards Goal  Note:   PHYSICAL THERAPY EVALUATION  Patient: Gerry Mejia (63 y.o. female)  Date: 3/12/2023  Primary Diagnosis: CVA (cerebral vascular accident) [facility]) [I63.9]       Precautions: Fall    ASSESSMENT  Based on the objective data described below, the patient presents with significant dizziness and dysequilibrium, CTA showing small area of potential ischemia, MRI negative for acute process. Pt endorses frequent falls, including fall resulting in L humerus fx a couple of years ago. Oculomotor and vestibular screen significant for L catch up sccades in horizontal and vertical, and short duration non-torsional nystagmus of L eye with head thrust test. BP WNL with positional changes and activity. Pt required CGA-Lindy for steadying with ambulation due to dizziness, recommend SPC use if dizziness does not resolve prior to D/C. Plan to see pt 5x/week while in house, and recommend OPPT for balance and vestibular therapy upon D/C. Current Level of Function Impacting Discharge (mobility/balance): Ambulation 80' CGA - Lindy for steadying    Functional Outcome Measure:   The patient scored 37/56 on the Garzon balance test outcome measure which is indicative of high fall risk. Other factors to consider for discharge: frequent falls prior to admission     Patient will benefit from skilled therapy intervention to address the above noted impairments. PLAN :  Recommendations and Planned Interventions: bed mobility training, transfer training, gait training, therapeutic exercises, neuromuscular re-education, patient and family training/education, and therapeutic activities      Frequency/Duration: Patient will be followed by physical therapy:  5 times a week to address goals. Recommendation for discharge: (in order for the patient to meet his/her long term goals)  Outpatient physical therapy follow up recommended for balance and vestibular therapy    This discharge recommendation:  A follow-up discussion with the attending provider and/or case management is planned    IF patient discharges home will need the following DME: to be determined (TBD) - SPC vs none         SUBJECTIVE:   Patient stated I was like wow. I need to go to the hospital.    OBJECTIVE DATA SUMMARY:   HISTORY:    Past Medical History:   Diagnosis Date    Diabetes [facility])     Psychiatric disorder     depression   No past surgical history on file.     Personal factors and/or comorbidities impacting plan of care: see above    Home Situation  Home Environment: Private residence  # Steps to Enter: 3  Rails to Enter: Yes  Wheelchair Ramp: No  One/Two Story Residence: One story  Living Alone: No  Support Systems: Child(zachariah), Other Family Member(s)  Patient Expects to be Discharged to[de-identified] Home  Current DME Used/Available at Home: None    EXAMINATION/PRESENTATION/DECISION MAKING:   Critical Behavior:  Neurologic State: Alert, Appropriate for age  Orientation Level: Appropriate for age, Oriented X4  Cognition: Appropriate decision making, Appropriate for age attention/concentration, Appropriate safety awareness, Follows commands    Range Of Motion:  AROM: Within functional limits     PROM: Within functional limits     Strength:    Strength: Generally decreased, functional     Tone & Sensation:   Tone: Normal        Sensation: Intact    Coordination:  Coordination: Generally decreased, functional    Functional Mobility:  Bed Mobility:  Rolling: Independent  Supine to Sit: Independent  Sit to Supine: Independent  Scooting: Independent  Transfers:  Sit to Stand: Stand-by assistance  Stand to Sit: Stand-by assistance    Balance:   Sitting: Impaired; With support  Sitting - Static: Fair (occasional)  Sitting - Dynamic: Fair (occasional)  Standing: Impaired  Standing - Static: Fair  Standing - Dynamic : Poor  Ambulation/Gait Training:  Distance (ft): 80 Feet (ft)  Assistive Device: Gait belt  Ambulation - Level of Assistance: Minimal assistance; Additional time;Assist x1     Gait Description (WDL): Exceptions to WDL  Gait Abnormalities: Shuffling gait;Trunk sway increased;Decreased step clearance; Path deviations        Base of Support: Widened     Speed/Henna: Pace decreased (<100 feet/min)  Step Length: Left shortened;Right shortened    Functional Measure:  Garzon Balance Test:    Sitting to Standing: 3  Standing Unsupported: 3  Sitting with Back Unsupported: 4  Standing to Sitting: 3  Transfers: 3  Standing Unsupported with Eyes Closed: 3  Standing Unsupported with Feet Together: 2  Reach Forward with Outstretched Arm: 3   Object: 3  Turn to Look Over Shoulders: 2  Turn 360 Degrees: 2  Alternate Foot on Step/Stool: 2  Standing Unsupported One Foot in Front: 3  Stand on One Le  Total: 37/56         56=Maximum possible score;   0-20=High fall risk  21-40=Moderate fall risk   41-56=Low fall risk     Physical Therapy Evaluation Charge Determination   History Examination Presentation Decision-Making   MEDIUM  Complexity : 1-2 comorbidities / personal factors will impact the outcome/ POC  LOW Complexity : 1-2 Standardized tests and measures addressing body structure, function, activity limitation and / or participation in recreation  LOW Complexity : Stable, uncomplicated  Other outcome measures dennis 37/56  HIGH       Based on the above components, the patient evaluation is determined to be of the following complexity level: LOW     Pain Rating:  Denies pain throughout session    Activity Tolerance:   Fair and requires rest breaks    After treatment patient left in no apparent distress:   Supine in bed, Call bell within reach, and bilateral stretcher rails raised, all needs within reach    COMMUNICATION/EDUCATION:   The patients plan of care was discussed with: Occupational therapist and Registered nurse. Fall prevention education was provided and the patient/caregiver indicated understanding., Patient/family have participated as able in goal setting and plan of care. , and Patient/family agree to work toward stated goals and plan of care.     Thank you for this referral.  Karis Jackson, PT, DPT   Time Calculation: 28 mins

## 2023-03-12 NOTE — ED NOTES
TRANSFER - OUT REPORT:    Verbal report given to Asad(name) tomasz Rodriguez  being transferred to [facility](unit) for routine progression of care       Report consisted of patients Situation, Background, Assessment and   Recommendations(SBAR). Information from the following report(s) SBAR, Kardex, ED Summary, Procedure Summary and MAR was reviewed with the receiving nurse. Lines:   Peripheral IV 03/11/23 Right Antecubital (Active)   Site Assessment Clean, dry, & intact 03/11/23 1859   Phlebitis Assessment 0 03/11/23 1859   Infiltration Assessment 0 03/11/23 1859   Dressing Status Clean, dry, & intact 03/11/23 1859        Opportunity for questions and clarification was provided.       Patient transported with:   Registered Nurse

## 2023-03-12 NOTE — PROGRESS NOTES
TRANSFER - IN REPORT:    Verbal report received from Merle(name) on Arleen Pitch  being received from ED(unit) for routine progression of care      Report consisted of patients Situation, Background, Assessment and   Recommendations(SBAR). Information from the following report(s) SBAR, Kardex, ED Summary, Procedure Summary, Intake/Output, MAR, Recent Results, and Cardiac Rhythm NSR  was reviewed with the receiving nurse. Opportunity for questions and clarification was provided. Assessment completed upon patients arrival to unit and care assumed.

## 2023-03-12 NOTE — CONSULTS
Neurology Consult - Inpatient      Name:   Lorena Gould  MRN:    [id number]    Date of Admission:  3/11/2023    Date of Consultation:  03/12/23       HISTORY OF PRESENT ILLNESS:     This is a 78 y.o. female with  has a past medical history of Diabetes ([address] and Psychiatric disorder. .    Neurology is asked to evaluate/ treat patient for TIA vs CVA    Patient describes that she woke up on 3/11/2023 around 3 AM to use the bathroom. After sitting up in bed she had sudden onset spinning sensation. She had to lay back down. She sat back up and it was still persistent. She had to go to the bathroom so she managed to hold onto the wall/objects as she made her way to the bathroom and back. She went back to bed and when she woke up in the morning she still had this dizzy sensation. She says that the sensation is moderately improved at this point, but she still feels dizzy when she sits up in the bed. No other neurologic symptoms (no facial droop, slurred speech, unilateral numbness or weakness). Not on antiplatelet or statin prior to admission    CT Head w/o contrast (3-11-23) :  1. No evidence of acute intracranial abnormality. 2. Frothy secretions in the right maxillary sinus suspicious for acute sinusitis. CT Brain Perfusion (3-11-23): Area of reversible ischemia in the posterior circulation on the right. .Small area of potential early reversible ischemia in the right superior cerebellum and right occipital lobe. CTA Head/ Neck (3-11-23): Multifocal moderate to severe stenoses of the posterior communicating artery and posterior cerebral artery on the right. Moderate atherosclerotic plaque of the common carotid artery on the right with mild stenosis of the proximal right ICA. There is no major vessel occlusion. There is no aneurysm or dissection identified    MRI Brain w/o contrast (0900 today): Ventricles:  Midline, no hydrocephalus. Brain Parenchyma/Brainstem:  Moderate patchy T2 hyperintensity in the supratentorial white matter and sandy. No acute infarction. Intracranial Hemorrhage:  None. Basal Cisterns:  Normal.  Flow Voids:  Normal. Additional Comments:  N/A. IMPRESSION:  Chronic white matter disease with no acute infarction. Labs: POC Glucose 221, CBC normal, CMP with elevated Glucose 239, INR 1.0, Tchol 168/ Trig 284/ HDL 43/ LDL 68, A1c 8.6.      TTE Pending    Complete Review of Systems: reviewed on admission H&P    ====================================     No Known Allergies    Current Facility-Administered Medications   Medication Dose Route Frequency Provider Last Rate Last Admin    insulin glargine (LANTUS) injection 9 Units  0.1 Units/kg SubCUTAneous DAILY Sarah Lerner MD   9 Units at 03/12/23 1130    clopidogreL (PLAVIX) tablet 75 mg  75 mg Oral DAILY Adelso Baldwin MD        [START ON 3/13/2023] atorvastatin (LIPITOR) tablet 10 mg  10 mg Oral DAILY Adelso Baldwin MD        acetaminophen (TYLENOL) tablet 500 mg  500 mg Oral Q6H PRN Kingsley Carbajal MD        cetirizine (ZYRTEC) tablet 10 mg  10 mg Oral DAILY Kingsley Carbajal MD   10 mg at 03/12/23 6358    diphenhydrAMINE (BENADRYL) capsule 25 mg  25 mg Oral Q6H PRN Lakia Resendiz MD        meclizine (ANTIVERT) tablet 25 mg  25 mg Oral TID PRN Lakia Resendiz MD   25 mg at 03/12/23 0806    methocarbamoL (ROBAXIN) tablet 500 mg  500 mg Oral TID PRN Lakia Resendiz MD        glucose chewable tablet 16 g  4 Tablet Oral PRN Kingsley Carbajal MD        glucagon (GLUCAGEN) injection 1 mg  1 mg IntraMUSCular PRN Lakia Resedniz MD        acetaminophen (TYLENOL) tablet 650 mg  650 mg Oral Q4H PRN Kingsley Kidd MD        Or    acetaminophen (TYLENOL) solution 650 mg  650 mg Per NG tube Q4H PRN Kingsley Kidd MD        Or    acetaminophen (TYLENOL) suppository 650 mg  650 mg Rectal Q4H PRN Kingsley Kidd MD        dextrose 10% infusion 0-250 mL  0-250 mL IntraVENous PRN Soni Carbajal MD        insulin lispro (HUMALOG) injection   SubCUTAneous AC&HS Hortensia Ireland MD   3 Units at 03/12/23 1130    aspirin chewable tablet 81 mg  81 mg Oral DAILY Kingsley Castelan MD   81 mg at 03/12/23 0806    enoxaparin (LOVENOX) injection 40 mg  40 mg SubCUTAneous Q24H Kingsley Castelan MD   40 mg at 03/11/23 2307    PARoxetine (PAXIL) tablet 40 mg  40 mg Oral DAILY Hortensia Ireland MD   40 mg at 03/12/23 0007     Current Outpatient Medications   Medication Sig Dispense Refill    glimepiride (AMARYL) 4 mg tablet Take 1 Tablet by mouth every morning. 30 Tablet 0    metFORMIN (GLUCOPHAGE) 1,000 mg tablet Take 1,000 mg by mouth two (2) times daily (with meals). Indications: TYPE 2 DIABETES MELLITUS      PARoxetine (PAXIL) 10 mg tablet Take  by mouth daily. Indications: DEPRESSION      meclizine (ANTIVERT) 25 mg tablet Take 1 Tablet by mouth three (3) times daily as needed for Dizziness. (Patient not taking: Reported on 3/11/2023) 20 Tablet 0    methocarbamoL (ROBAXIN) 500 mg tablet Take 1 Tablet by mouth three (3) times daily as needed for Muscle Spasm(s) or Pain (Cause drowsiness do not take while driving). (Patient not taking: Reported on 3/11/2023) 10 Tablet 0    acetaminophen (Acetaminophen Extra Strength) 500 mg tablet Take 1 Tablet by mouth every six (6) hours as needed for Pain or Fever. 20 Tablet 0    cetirizine (ZYRTEC) 10 mg tablet Take 1 Tablet by mouth daily. (Patient not taking: Reported on 3/11/2023) 30 Tablet 0    fluticasone propionate (FLONASE) 50 mcg/actuation nasal spray 2 Sprays by Both Nostrils route daily. (Patient not taking: Reported on 3/11/2023) 16 g 0    diphenhydrAMINE (BenadryL) 25 mg capsule Take 1 Capsule by mouth every six (6) hours as needed for Itching. (Patient not taking: Reported on 3/11/2023) 20 Capsule 0    traMADol-acetaminophen (ULTRACET) 37.5-325 mg per tablet Take 1 Tab by mouth every six (6) hours as needed for Pain. Max Daily Amount: 4 Tabs.  (Patient not taking: Reported on 3/11/2023) 20 Tab 0       PSHx  has no past surgical history on file. SocHx  reports that she has quit smoking. She does not have any smokeless tobacco history on file. She reports that she does not currently use alcohol. She reports that she does not use drugs. FHx family history is not on file. PHYSICAL EXAM    Patient Vitals for the past 4 hrs:   Temp Pulse Resp BP SpO2   03/12/23 1227 98.2 °F (36.8 °C) 82 16 118/63 97 %   03/12/23 1108 -- 79 19 -- 96 %   03/12/23 1053 98 °F (36.7 °C) 83 20 112/68 95 %   03/12/23 1012 -- -- -- 121/60 --   03/12/23 1010 -- -- -- 130/72 --         Awake alert resting calm conversant. Face is symmetric, facial sensation is intact on both sides, tongue is midline, soft palate elevates symmetrically, shoulder shrug is symmetric. EOMI, visual fields normal on both sides. Hearing speech is normal.    No resting postural or intention tremors. Out of 5 strength in all extremities. Intact light touch in all extremities. Reflexes are 1+ biceps, 1-2+ right patellar, 2+ left patellar. Plantar response, gait, Romberg were not tested. Labs/ Radiology     See above      Assessment/ Plan     Acute onset dizziness/ vertigo    CT Perfusion study of brain showed Area of reversible ischemia in the posterior circulation on the right. .Small area of potential early reversible ischemia in the right superior cerebellum and right occipital lobe. CTA Head/ Neck shows: 50% right ICA stenosis, 0% left ICA stenosis, moderate right Pcomm stenosis, moderate to severe right Vertebral artery stenosis; The remainder of the intracranial arteries (including basilar artery) are patent and no aneurysm or dissection was identified. Brain MRI done today does not show evidence of recent or remote stroke.   D/w patient it shows mild chronic small vessel ischemic changes which are due to her underlying medical conditions (DM, Elevated Cholesterol, Non-smoker, no hx of HTN) and advised her to work with PCP to control those so small vessel ischemic changes don't increase (could cause memory difficulty if the white matter changes are severe). D/w pt that has TIA (vertebrobasilar TIA) and residual mild symptoms are due to same etiology (vertebrobasilar insufficiency)    Agree with pt being started on ASA 81 mg/ day. LDL is at goal < 70 but will add low-dose statin to reduce change of future TIA/ Stroke. Will also add Plavix 75 mg/ day (dual antiplatelet therapy) due to degree of right Pcomm/ right vertebral artery stenosis to reduce chance of future TIA/ Stroke. D/w patient that she should take both for minimum 3 weeks, and if she is experiencing excessive bruising after this point, then she can stop the Plavix and continue the Aspirin. Due to the right Pcomm/ vertebral stenoses, should be seen by Neuro-Interventional Surgery as outpatient. Will set up referral to that Clinic when she follows up with me in Neurology Clinic. She had separate concerns about memory difficulty, said she is in a very stressful/ tense living situation. Reviewed that brain mri is normal, which is reassuring. She mentions hx of vit B12 deficiency and getting shots in past, not recently. Will check Vit B12 level, Folate, TSH while she's here    TTE pending. If that's normal, can be d/c home today from my standpoint      Pt cannot drive x 3 months per [facility] regulations      Follow up with me at the [address] [place] [address] Neurology Clinic (left contact info in dc section)         Thank you for asking the Neurology Service to evaluate [facility].       Signed By: Charmaine Butts MD     March 12, 2023    =f

## 2023-03-12 NOTE — DISCHARGE INSTRUCTIONS
Hospital Medicine DISCHARGE INSTRUCTIONS    NAME: Gerry Mejia   :  1955   MRN:  [id number]     Date:     3/12/2023    ADMIT DATE: 3/11/2023     DISCHARGE DATE: 3/12/2023     PRINCIPAL ADMITTING DIAGNOSIS:  CVA (cerebral vascular accident) ([facility] [I63.9]    DISCHARGE DIAGNOSES:  Principal Problem:    TIA (transient ischemic attack) (3/12/2023)    Vertebrobasilar insufficiency (3/12/2023)    DM (diabetes mellitus), type 2 ([facility] (3/11/2023)    Depression (3/11/2023)    MEDICATIONS:    It is important that medications are taken exactly as they are prescribed on the discharge medication instructions and keep them your  in the bottles provided by the pharmacist.   Keep a list of the medication names, dosages, and times to be taken at all times. Do not take other medications without consulting your doctor. Recommended diet:  Diabetic Diet    Recommended activity: Activity as tolerated and No driving for 3 months per [facility] regulations    Post discharge care:    Notify follow up health care provider or return to the emergency department if you cannot get hold of your doctor if you feel worse or experience symptoms similar to those that brought you to hospital    Sonia Show, [address] [address] [phone]    Schedule an appointment as soon as possible for a visit in 3 week(s)  Call to schedule follow up visit with Neurologist after your recent TIA. Information obtained by :  I understand that if any problems occur once I am at home I am to contact my physician and I understand and acknowledge receipt of the instructions indicated above.                                                                                                                                            Physician's or R.N.'s Signature                                                                  Date/Time Patient or Representative Signature                                                          Date/Time

## 2023-03-12 NOTE — ED NOTES
Bedside and Verbal shift change report given to [address] [place] [address] (oncoming nurse) by Prince Spencer (offgoing nurse). Report included the following information SBAR, ED Summary, MAR and Recent Results.

## 2023-03-12 NOTE — ED NOTES
Stroke Education provided to patient and the following topics were discussed    1. Patients personal risk factors for stroke are family history    2. Warning signs of Stroke:        * Sudden numbness or weakness of the face, arm or leg, especially on one side of          The body            * Sudden confusion, trouble speaking or understanding        * Sudden trouble seeing in one or both eyes        * Sudden trouble walking, dizziness, loss of balance or coordination        * Sudden severe headache with no known cause      3. Importance of activation Emergency Medical Services ( 9-1-1 ) immediately if experience any warning signs of stroke. 4. Be sure and schedule a follow-up appointment with your primary care doctor or any specialists as instructed. 5. You must take medicine every day to treat your risk factors for stroke. Be sure to take your medicines exactly as your doctor tells you: no more, no less. Know what your medicines are for , what they do. Anti-thrombotics /anticoagulants can help prevent strokes. You are taking the following medicine(s)  Not rx'd at this time     6. Smoking and second-hand smoke greatly increase your risk of stroke, cardiovascular disease and death. Smoking history never    7. Information provided was Verbal Education    8. Documentation of teaching completed in Patient Education Activity and on Care Plan with teaching response noted?

## 2023-03-12 NOTE — H&P
[facility]  [address] [facility] [address]  [phone]    Hospitalist Admission Note      NAME:  Basilia Locke   :   1955   MRN:  [id number]     PCP:  Howard Vasquez MD     Date/Time of service:  3/11/2023 8:21 PM          Subjective:     CHIEF COMPLAINT: Dizziness    HISTORY OF PRESENT ILLNESS:     Ms. Espinoza Estrella is a 78 y.o.  female who presented to the Emergency Department complaining of dizziness. Patient is alert awake oriented x3. She lives at home. She woke up this morning around 3 AM feeling very dizzy and she fell backwards to her bed. She denies any weakness or numbness in her extremities. She denies having symptoms like that before. She had an episode almost 30 years back when she had left sided weakness. But it improved on its own. She is also diabetic. Her blood glucose level was 239 when she came in. He denies any headache. She continues to have dizziness and she had a piece of barbecue in the afternoon at her son's barbecue. She threw up after that. She had a CTA of head and neck which did not show any large vessel occlusion. There was a small area of potential early reversible ischemia in the right superior cerebellum and right occipital lobe. Past Medical History:   Diagnosis Date    Diabetes [facility])     Psychiatric disorder     depression        No past surgical history on file. Social History     Tobacco Use    Smoking status: Former    Smokeless tobacco: Not on file   Substance Use Topics    Alcohol use: Not Currently     Comment: rarely      Family history is positive for stroke in grandmother. No Known Allergies     Prior to Admission medications    Medication Sig Start Date End Date Taking? Authorizing Provider   meclizine (ANTIVERT) 25 mg tablet Take 1 Tablet by mouth three (3) times daily as needed for Dizziness.  23   Keesha Workman NP   methocarbamoL (ROBAXIN) 500 mg tablet Take 1 Tablet by mouth three (3) times daily as needed for Muscle Spasm(s) or Pain (Cause drowsiness do not take while driving). 1/31/23   Keesha Workman NP   acetaminophen (Acetaminophen Extra Strength) 500 mg tablet Take 1 Tablet by mouth every six (6) hours as needed for Pain or Fever. 1/31/23   Keesha Workman NP   cetirizine (ZYRTEC) 10 mg tablet Take 1 Tablet by mouth daily. 12/6/22   Pari Pleva, DO   fluticasone propionate (FLONASE) 50 mcg/actuation nasal spray 2 Sprays by Both Nostrils route daily. 12/6/22   Pari Pleva, DO   glimepiride (AMARYL) 4 mg tablet Take 1 Tablet by mouth every morning. 10/5/22   Nicolle Garcia MD   diphenhydrAMINE (BenadryL) 25 mg capsule Take 1 Capsule by mouth every six (6) hours as needed for Itching. 10/5/22   Nicolle Garcia MD   traMADol-acetaminophen (ULTRACET) 37.5-325 mg per tablet Take 1 Tab by mouth every six (6) hours as needed for Pain. Max Daily Amount: 4 Tabs. 3/27/16   Kalyan Gonzalez MD   metFORMIN (GLUCOPHAGE) 1,000 mg tablet Take 1,000 mg by mouth two (2) times daily (with meals). Indications: TYPE 2 DIABETES MELLITUS    Chavo Reveles MD   PARoxetine (PAXIL) 10 mg tablet Take  by mouth daily.  Indications: DEPRESSION    Abdirizak, MD Chavo       Review of Systems:  (bold if positive, if negative)    Gen:  Eyes:  ENT:  CVS:  Pulm:  GI: Nausea and vomiting positive :  MS:  Skin:  Psych:  Endo:  Hem:  Renal:  Neuro: Dizziness positive       Objective:      VITALS:    Vital signs reviewed; most recent are:    Visit Vitals  BP (!) 152/82 (BP 1 Location: Left upper arm, BP Patient Position: Sitting)   Pulse 90   Temp 97.9 °F (36.6 °C)   Resp 16   Ht 5' 7\" (1.702 m)   Wt 87.1 kg (192 lb)   SpO2 98%   BMI 30.07 kg/m²     SpO2 Readings from Last 6 Encounters:   03/11/23 98%   01/31/23 99%   12/06/22 99%   10/04/22 97%   11/13/20 99%   03/05/20 (P) 95%        No intake or output data in the 24 hours ending 03/11/23 2032     Exam:     Physical Exam:    Gen:  Well-developed, well-nourished, in no acute distress  HEENT:  Pink conjunctivae, PERRL, hearing intact to voice, moist mucous membranes  Neck:  Supple, without masses, thyroid non-tender  Resp:  No accessory muscle use, clear breath sounds without wheezes rales or rhonchi  Card:  No murmurs, normal S1, S2 without thrills, bruits or peripheral edema  Abd:  Soft, non-tender, non-distended, normoactive bowel sounds are present, no mass  Lymph:  No cervical or inguinal adenopathy  Musc:  No cyanosis or clubbing  Skin:  No rashes or ulcers, skin turgor is good  Neuro:  Cranial nerves are grossly intact, no focal motor weakness, follows commands appropriately, dizziness positive  Psych:  Good insight, oriented to person, place and time, alert     Labs:    Recent Labs     03/11/23  1755   WBC 8.4   HGB 13.9   HCT 40.6        Recent Labs     03/11/23  1755      K 4.2      CO2 29   *   BUN 18   CREA 0.65   CA 10.4*   ALB 3.8   TBILI 0.4   ALT 18     Lab Results   Component Value Date/Time    Glucose (POC) 221 (H) 03/11/2023 05:52 PM    Glucose (POC) 291 (H) 01/31/2023 02:48 PM     No results for input(s): PH, PCO2, PO2, HCO3, FIO2 in the last 72 hours. Recent Labs     03/11/23 1755   INR 1.0     All Micro Results       None            I have reviewed previous records  IMPRESSION  Small area of potential early reversible ischemia in the right superior  cerebellum and right occipital lobe. No concordant area of gray-white differentiation loss. Multifocal moderate to severe stenoses of the posterior communicating artery and  posterior cerebral artery on the right. Moderate atherosclerotic plaque of the common carotid artery on the right with  mild stenosis of the proximal right ICA. There is no major vessel occlusion. There is no aneurysm or dissection identified.       Assessment and Plan:      Principal Problem:    CVA (cerebral vascular accident) ([facility] (3/11/2023)    Active Problems:    DM (diabetes mellitus) ([facility] (3/11/2023)      Depression (3/11/2023)     Plan:    1. Dizziness-probably related to CVA. We will do MRI of the brain. CT of the head showed a right common carotid artery 50% stenosis. Echocardiogram, lipid profile, neurology consult. 2.  Anxiety and depression-continue paroxetine. 3.  Diabetes-continue Amaryl and start sliding scale insulin. I will hold metformin at this time. 4.  DVT prophylaxis-subcu Lovenox. Patient is a full code. Telemetry reviewed:   normal sinus rhythm    Risk of deterioration: high      Total time spent with patient: 79 Minutes I personally reviewed chart, notes, data and current medications in the medical record. I have personally examined and treated the patient at bedside during this period. To assist coordination of care and communication with nursing and staff, this note may be preliminary early in the day, but finalized by end of the day.                  Care Plan discussed with: Patient    Discussed:  Code Status and Care Plan       ___________________________________________________    Attending Physician: Arlie Lundborg, MD

## 2023-03-12 NOTE — ED NOTES
Bedside shift change report completed with Anuja Lundberg RN (oncoming nurse) to include sbar, plan of care, admit status, mRI this am

## 2023-03-12 NOTE — DISCHARGE SUMMARY
Nils Ward vanessa [place] 80  [address], [address], [address] [facility]  Tel: [phone]    Hospital Medicine Discharge Summary    Patient ID:    Julia Lawrence  Age:              78 y.o.    : 1955  MRN:             [id number]     PCP: Mumtaz Zapata MD     Date of Admission: 3/11/2023    Date of Discharge:  3/12/2023    Discharge Diagnoses:  Principal Problem:    TIA (transient ischemic attack) (3/12/2023)    Vertebrobasilar insufficiency (3/12/2023)    DM (diabetes mellitus), type 2 ([facility] [address] (3/11/2023)    Depression ()     Systolic dysfunction    Reason for admission:    CVA (cerebral vascular accident) [facility]) [I63.9]    Hospital Course:     Ms. Vickie Ferreira is a 78 y.o. admitted to [facility] and treated for the following:    TIA (transient ischemic attack) due to a vertebrobasilar insufficiency (3/12/2023) POA: admitted with a suspected CVA but symptoms concerning for BPPV. CT code neuro showed no acute changes. CTA head and neck showed a a small area of potential early reversible ischemia in the right superior cerebellum and right occipital lobe. No concordant area of gray-white differentiation loss. Multifocal moderate to severe stenoses of the posterior communicating artery and posterior cerebral artery on the right. Moderate atherosclerotic plaque of the common carotid artery on the right with mild stenosis of the proximal right ICA. MRI showed chronic white matter disease with no acute infarction. Echocardiogram showed no shunt but an EF of 40-45% (called the patient who had left the hospital prior to the results and notified her - advised to call and schedule follow up with Dr Dorris Mortimer T. LDL 68. Continue Asprin, Plavix x 21 days and Lipitor. No driving x 3 months     DM (diabetes mellitus), type 2 ([address] (3/11/2023) POA: A1c 8.6. BG remains elevated. Continue home Amaryl and Metformin.  She declined to be started on insulin preferring follow up and discussions with her PCP. DM diet     Depression (3/11/2023) POA: continue Prozac     Acute sinusitis POA: Continue Cetrizine     Most recent labs:  Recent Labs     03/11/23  1755   WBC 8.4   HGB 13.9   HCT 40.6        Recent Labs     03/11/23  1755      K 4.2      CO2 29   *   BUN 18   CREA 0.65   CA 10.4*   ALB 3.8   ALT 18   INR 1.0     No components found for: [place]    Discharge Exam:  Visit Vitals  /78   Pulse 76   Temp 98.2 °F (36.8 °C)   Resp 16   Ht 5' 7\" (1.702 m)   Wt 87.1 kg (192 lb)   SpO2 97%   BMI 30.07 kg/m²        Patient has been seen and examined. General: well looking and in no acute distress  Pulm: clear breath sounds without wheezes  Card: no murmurs, normal S1, S2 without thrills, bruits   Abd:    soft, non-tender, normoactive bowel sounds  Skin: no rashes and skin turgor is good  Neuro: awake, alert and has a non focal     Activity: Activity as tolerated    Diet: Regular Diet    Current Discharge Medication List        START taking these medications    Details   aspirin 81 mg chewable tablet Take 1 Tablet by mouth daily for 30 days. Indications: stroke prevention  Qty: 30 Tablet, Refills: 1      atorvastatin (LIPITOR) 10 mg tablet Take 1 Tablet by mouth daily for 30 days. Indications: stroke prevention  Qty: 30 Tablet, Refills: 1    Associated Diagnoses: Vertebrobasilar TIAs; Vertigo; Vertebral artery stenosis, symptomatic, without infarction, right      clopidogreL (PLAVIX) 75 mg tab Take 1 Tablet by mouth daily for 30 days. Indications: prevention for a blood clot going to the brain  Qty: 30 Tablet, Refills: 0    Associated Diagnoses: Vertebrobasilar TIAs; Vertigo; Vertebral artery stenosis, symptomatic, without infarction, right           CONTINUE these medications which have NOT CHANGED    Details   glimepiride (AMARYL) 4 mg tablet Take 1 Tablet by mouth every morning.   Qty: 30 Tablet, Refills: 0      metFORMIN (GLUCOPHAGE) 1,000 mg tablet Take 1,000 mg by mouth two (2) times daily (with meals). Indications: TYPE 2 DIABETES MELLITUS      PARoxetine (PAXIL) 10 mg tablet Take  by mouth daily. Indications: DEPRESSION      acetaminophen (Acetaminophen Extra Strength) 500 mg tablet Take 1 Tablet by mouth every six (6) hours as needed for Pain or Fever. Qty: 20 Tablet, Refills: 0           STOP taking these medications       meclizine (ANTIVERT) 25 mg tablet Comments:   Reason for Stopping:         methocarbamoL (ROBAXIN) 500 mg tablet Comments:   Reason for Stopping:         cetirizine (ZYRTEC) 10 mg tablet Comments:   Reason for Stopping:         fluticasone propionate (FLONASE) 50 mcg/actuation nasal spray Comments:   Reason for Stopping:         diphenhydrAMINE (BenadryL) 25 mg capsule Comments:   Reason for Stopping:         traMADol-acetaminophen (ULTRACET) 37.5-325 mg per tablet Comments:   Reason for Stopping:               Bora Mcgovern MD  [address] [facility] [address]  [address]  [phone]    Schedule an appointment as soon as possible for a visit in 3 week(s)  Call to schedule follow up visit with Neurologist after your recent TIA. Shashi Hughes MD  [address] [address]  [place], [address] [facility],   [address]  [phone]    Schedule an appointment as soon as possible for a visit  To scheduled follow up for the reduced LV function      Follow-up tests or labs: None    Discharge Condition: Stable    Disposition: home    Time taken to co-ordinate and arrange discharge:  35 minutes.     Signed:  Sunday Ackerman MD       3/13/2023   7:10 PM

## 2023-03-13 LAB
ATRIAL RATE: 90 BPM
CALCULATED P AXIS, ECG09: 58 DEGREES
CALCULATED R AXIS, ECG10: -7 DEGREES
CALCULATED T AXIS, ECG11: -10 DEGREES
DIAGNOSIS, 93000: NORMAL
ECHO LA DIAMETER INDEX: 1.86 CM/M2
ECHO LA DIAMETER: 3.7 CM
ECHO LV FRACTIONAL SHORTENING: 18 % (ref 28–44)
ECHO LV INTERNAL DIMENSION DIASTOLE INDEX: 2.46 CM/M2
ECHO LV INTERNAL DIMENSION DIASTOLIC: 4.9 CM (ref 3.9–5.3)
ECHO LV INTERNAL DIMENSION SYSTOLIC INDEX: 2.01 CM/M2
ECHO LV INTERNAL DIMENSION SYSTOLIC: 4 CM
ECHO LV IVSD: 1 CM (ref 0.6–0.9)
ECHO LV MASS 2D: 200.5 G (ref 67–162)
ECHO LV MASS INDEX 2D: 100.8 G/M2 (ref 43–95)
ECHO LV POSTERIOR WALL DIASTOLIC: 1.2 CM (ref 0.6–0.9)
ECHO LV RELATIVE WALL THICKNESS RATIO: 0.49
ECHO LVOT AREA: 3.1 CM2
ECHO LVOT DIAM: 2 CM
FOLATE SERPL-MCNC: 22.4 NG/ML (ref 5–21)
P-R INTERVAL, ECG05: 172 MS
Q-T INTERVAL, ECG07: 366 MS
QRS DURATION, ECG06: 84 MS
QTC CALCULATION (BEZET), ECG08: 447 MS
VENTRICULAR RATE, ECG03: 90 BPM
VIT B12 SERPL-MCNC: 262 PG/ML (ref 193–986)

## 2023-03-13 PROCEDURE — 93308 TTE F-UP OR LMTD: CPT | Performed by: INTERNAL MEDICINE

## 2023-03-13 PROCEDURE — 93325 DOPPLER ECHO COLOR FLOW MAPG: CPT | Performed by: INTERNAL MEDICINE

## 2023-03-21 ENCOUNTER — OFFICE VISIT (OUTPATIENT)
Dept: NEUROLOGY | Age: 68
End: 2023-03-21
Payer: MEDICARE

## 2023-03-21 VITALS — OXYGEN SATURATION: 96 % | HEART RATE: 90 BPM | DIASTOLIC BLOOD PRESSURE: 84 MMHG | SYSTOLIC BLOOD PRESSURE: 110 MMHG

## 2023-03-21 DIAGNOSIS — I65.01: Primary | ICD-10-CM

## 2023-03-21 DIAGNOSIS — E53.8 VITAMIN B12 DEFICIENCY: ICD-10-CM

## 2023-03-21 PROCEDURE — G8400 PT W/DXA NO RESULTS DOC: HCPCS | Performed by: PSYCHIATRY & NEUROLOGY

## 2023-03-21 PROCEDURE — 1101F PT FALLS ASSESS-DOCD LE1/YR: CPT | Performed by: PSYCHIATRY & NEUROLOGY

## 2023-03-21 PROCEDURE — G8428 CUR MEDS NOT DOCUMENT: HCPCS | Performed by: PSYCHIATRY & NEUROLOGY

## 2023-03-21 PROCEDURE — 99215 OFFICE O/P EST HI 40 MIN: CPT | Performed by: PSYCHIATRY & NEUROLOGY

## 2023-03-21 PROCEDURE — G9717 DOC PT DX DEP/BP F/U NT REQ: HCPCS | Performed by: PSYCHIATRY & NEUROLOGY

## 2023-03-21 PROCEDURE — 1123F ACP DISCUSS/DSCN MKR DOCD: CPT | Performed by: PSYCHIATRY & NEUROLOGY

## 2023-03-21 PROCEDURE — G9899 SCRN MAM PERF RSLTS DOC: HCPCS | Performed by: PSYCHIATRY & NEUROLOGY

## 2023-03-21 PROCEDURE — G8536 NO DOC ELDER MAL SCRN: HCPCS | Performed by: PSYCHIATRY & NEUROLOGY

## 2023-03-21 PROCEDURE — 3017F COLORECTAL CA SCREEN DOC REV: CPT | Performed by: PSYCHIATRY & NEUROLOGY

## 2023-03-21 PROCEDURE — 1090F PRES/ABSN URINE INCON ASSESS: CPT | Performed by: PSYCHIATRY & NEUROLOGY

## 2023-03-21 PROCEDURE — G8417 CALC BMI ABV UP PARAM F/U: HCPCS | Performed by: PSYCHIATRY & NEUROLOGY

## 2023-03-21 NOTE — PROGRESS NOTES
Yvette Spring (1955) is a 78 y.o. female, established patient, here for evaluation of the following     Chief complaint(s):   Chief Complaint   Patient presents with    New Patient       SUBJECTIVE/ OBJECTIVE:    HPI: 78 y.o. female      Seen at [facility] on 3- for Acute onset dizziness/ vertigo. Found to be due to Vertebrobasilar TIA in setting of moderate to severe right vertebral artery stenosis and moderate right Posterior Communicating Artery Stenosis. Added ASA 81 mg/ day, Lipitor 10 mg/ day (LDL was at goal of < 70), and Plavix 75 mg/ day. Had some concern about memory difficulty (reported she was in a very stressful / tense living situation), hx of Vit B12 deficiency. TSH was normal, Folate was mildly elevated, B12 was 262 (at low end of normal range)    No recurrence of TIA symptoms since discharge  Describes dizziness if she turns head quickly  Reviewed with her that this is likely due to the vertebral artery stenosis on the right    Discussed the low-normal B12 levels and advised her to start taking a daily B12 supplement (1000 micrograms daily)      ========================================    Brief Hx     CT Brain Perfusion: Area of reversible ischemia in the posterior circulation on the right. .Small area of potential early reversible ischemia in the right superior cerebellum and right occipital lobe. CTA Head/ Neck shows: 50% right ICA stenosis, 0% left ICA stenosis, moderate right Pcomm stenosis, moderate to severe right Vertebral artery stenosis; The remainder of the intracranial arteries (including basilar artery) are patent and no aneurysm or dissection was identified. Brain MRI done today does not show evidence of recent or remote stroke.   D/w patient it shows mild chronic small vessel ischemic changes which are due to her underlying medical conditions (DM, Elevated Cholesterol, Non-smoker, no hx of HTN) and advised her to work with PCP to control those so small vessel ischemic changes don't increase (could cause memory difficulty if the white matter changes are severe). TTE didn't show any severe abnormalities:       Left Ventricle: Mildly reduced left ventricular systolic function with a visually estimated EF of 40 - 45%. Left ventricle size is normal. Mildly increased wall thickness. Mitral Valve: Valve structure is normal. Moderate annular calcification of the mitral valve. Interatrial Septum: No interatrial shunt visualized with color Doppler. Agitated saline study was negative with and without provocation      No Known Allergies      Current Outpatient Medications:     aspirin 81 mg chewable tablet, Take 1 Tablet by mouth daily for 30 days. Indications: stroke prevention, Disp: 30 Tablet, Rfl: 1    atorvastatin (LIPITOR) 10 mg tablet, Take 1 Tablet by mouth daily for 30 days. Indications: stroke prevention, Disp: 30 Tablet, Rfl: 1    clopidogreL (PLAVIX) 75 mg tab, Take 1 Tablet by mouth daily for 30 days. Indications: prevention for a blood clot going to the brain, Disp: 30 Tablet, Rfl: 0    acetaminophen (Acetaminophen Extra Strength) 500 mg tablet, Take 1 Tablet by mouth every six (6) hours as needed for Pain or Fever., Disp: 20 Tablet, Rfl: 0    glimepiride (AMARYL) 4 mg tablet, Take 1 Tablet by mouth every morning., Disp: 30 Tablet, Rfl: 0    metFORMIN (GLUCOPHAGE) 1,000 mg tablet, Take 1,000 mg by mouth two (2) times daily (with meals). Indications: TYPE 2 DIABETES MELLITUS, Disp: , Rfl:     PARoxetine (PAXIL) 10 mg tablet, Take  by mouth daily. Indications: DEPRESSION, Disp: , Rfl:      has a past medical history of Diabetes ([address] and Psychiatric disorder. has no past surgical history on file.       Physical Exam:    Vitals:    03/21/23 1452   BP: 110/84   BP 1 Location: Left upper arm   BP Patient Position: Sitting   BP Cuff Size: Adult   Pulse: 90   SpO2: 96%     Awake alert resting calm conversant, EOMI, visual fields normal both sides, hearing speech normal, face symmetric. No resting or postural tremors. Intact light touch all extremities. 5 out of 5 strength in all extremities. Stands slowly/cautiously, has normal-appearing gait, a little unsteady when she is turning.      ========================================    ASSESSMENT/ PLAN:       ICD-10-CM ICD-9-CM    1. Symptomatic vertebral artery stenosis without infarction, right  I65.01 433.20 REFERRAL TO NEUROLOGY      2. Vitamin B12 deficiency  E53.8 266.2            Continue ASA 81 mg/ day, Plavix 75 mg/ day, and Lipitor 10 mg QHS    Referred to Neuro-Interventional Surgery Clinic for their input on right vertebral artery stenosis     Discused stroke-TIA signs/ symptoms; given a patient hand out / and fridge magnet listing stroke signs-symptoms with advice to call 911 if she were to have any in the future. Advised pt to start VIt B12 supplement daily    D/w patient no driving x 3 months after TIA, per [facility] regulations    Follow up with me in 3 months    I spent 40 minutes on today's encounter        An electronic signature was used to authenticate this note.   -- Renata Brown MD

## 2023-03-21 NOTE — LETTER
3/21/2023    Patient: Alex Beyer   YOB: 1955   Date of Visit: 3/21/2023     Deanne Villa MD  [address]  [address] [facility] [address]  Via Fax: [phone]    Dear Deanne Villa MD,      Thank you for referring Ms. Candi Conti to [address] [place] [address] for evaluation. My notes for this consultation are attached. If you have questions, please do not hesitate to call me. I look forward to following your patient along with you.       Sincerely,    Laurita Padilla MD

## 2023-03-21 NOTE — PROGRESS NOTES
Pt is having some dizziness/ balance, no falls  Having some blurred vision  Mentioned he diabetes have been up and down relating some symptoms to

## 2023-04-23 DIAGNOSIS — Z12.31 BREAST CANCER SCREENING BY MAMMOGRAM: Primary | ICD-10-CM

## 2023-04-23 DIAGNOSIS — Z12.31 SCREENING MAMMOGRAM FOR HIGH-RISK PATIENT: Primary | ICD-10-CM

## 2023-04-26 ENCOUNTER — OFFICE VISIT (OUTPATIENT)
Dept: NEUROSURGERY | Age: 68
End: 2023-04-26
Payer: MEDICARE

## 2023-04-26 VITALS
WEIGHT: 194.6 LBS | BODY MASS INDEX: 29.49 KG/M2 | HEART RATE: 86 BPM | OXYGEN SATURATION: 98 % | TEMPERATURE: 96.8 F | SYSTOLIC BLOOD PRESSURE: 110 MMHG | DIASTOLIC BLOOD PRESSURE: 64 MMHG | HEIGHT: 68 IN

## 2023-04-26 DIAGNOSIS — G45.0 VERTEBROBASILAR INSUFFICIENCY: ICD-10-CM

## 2023-04-26 DIAGNOSIS — G45.9 TIA (TRANSIENT ISCHEMIC ATTACK): Primary | ICD-10-CM

## 2023-04-26 PROCEDURE — G8417 CALC BMI ABV UP PARAM F/U: HCPCS | Performed by: RADIOLOGY

## 2023-04-26 PROCEDURE — 99204 OFFICE O/P NEW MOD 45 MIN: CPT | Performed by: RADIOLOGY

## 2023-04-26 PROCEDURE — 1123F ACP DISCUSS/DSCN MKR DOCD: CPT | Performed by: RADIOLOGY

## 2023-04-26 PROCEDURE — 1090F PRES/ABSN URINE INCON ASSESS: CPT | Performed by: RADIOLOGY

## 2023-04-26 PROCEDURE — 3017F COLORECTAL CA SCREEN DOC REV: CPT | Performed by: RADIOLOGY

## 2023-04-26 PROCEDURE — G8536 NO DOC ELDER MAL SCRN: HCPCS | Performed by: RADIOLOGY

## 2023-04-26 PROCEDURE — G8400 PT W/DXA NO RESULTS DOC: HCPCS | Performed by: RADIOLOGY

## 2023-04-26 PROCEDURE — G9899 SCRN MAM PERF RSLTS DOC: HCPCS | Performed by: RADIOLOGY

## 2023-04-26 PROCEDURE — G9717 DOC PT DX DEP/BP F/U NT REQ: HCPCS | Performed by: RADIOLOGY

## 2023-04-26 PROCEDURE — G8427 DOCREV CUR MEDS BY ELIG CLIN: HCPCS | Performed by: RADIOLOGY

## 2023-04-26 PROCEDURE — 1101F PT FALLS ASSESS-DOCD LE1/YR: CPT | Performed by: RADIOLOGY

## 2023-04-26 RX ORDER — CLOPIDOGREL BISULFATE 75 MG/1
75 TABLET ORAL DAILY
COMMUNITY
End: 2023-05-04

## 2023-04-26 RX ORDER — ATORVASTATIN CALCIUM 10 MG/1
10 TABLET, FILM COATED ORAL DAILY
COMMUNITY

## 2023-04-26 RX ORDER — ASPIRIN 81 MG/1
81 TABLET ORAL DAILY
COMMUNITY

## 2023-05-02 NOTE — PROGRESS NOTES
Neurointerventional Surgery Clinic Note        Patient: Bevely Libman MRN: [id number]  SSN: [id number]    YOB: 1955  Age: 80 y.o. Sex: female      Subjective:      Bevely Libman is a 80 y.o. female who is being seen for vertebrobasilar insufficiency. Patient underwent work-up for fall/loss of consciousness in January 2023 when intracranial atherosclerotic disease involving the distal vertebrobasilar circulation was identified. Patient has been referred by Dr. Tevin Otto from neurology for neuro endovascular recommendations. Past Medical History:   Diagnosis Date    Anxiety     Cerebral artery occlusion with cerebral infarction ([facility] [address]     Depression     Diabetes ([facility] [address]     Falls     Fatigue     Neuropathy     Psychiatric disorder     depression    Snoring     Visual disturbance      Past Surgical History:   Procedure Laterality Date    HX TUBAL LIGATION        Family History   Problem Relation Age of Onset    Heart Disease Father     Cancer Paternal Aunt     Stroke Maternal Grandmother     Neuropathy Maternal Grandmother     Headache Maternal Grandmother     Heart Disease Maternal Grandfather     Cancer Paternal Grandmother      Social History     Tobacco Use    Smoking status: Former    Smokeless tobacco: Not on file   Substance Use Topics    Alcohol use: Not Currently     Comment: rarely      Current Outpatient Medications   Medication Sig Dispense Refill    clopidogreL (Plavix) 75 mg tab Take 1 Tablet by mouth daily. atorvastatin (LIPITOR) 10 mg tablet Take 1 Tablet by mouth daily. aspirin delayed-release 81 mg tablet Take 1 Tablet by mouth daily. acetaminophen (Acetaminophen Extra Strength) 500 mg tablet Take 1 Tablet by mouth every six (6) hours as needed for Pain or Fever. 20 Tablet 0    glimepiride (AMARYL) 4 mg tablet Take 1 Tablet by mouth every morning. 30 Tablet 0    metFORMIN (GLUCOPHAGE) 1,000 mg tablet Take 1 Tablet by mouth two (2) times daily (with meals). Indications: type 2 diabetes mellitus      PARoxetine (PAXIL) 10 mg tablet Take  by mouth daily. Indications: DEPRESSION          No Known Allergies    Review of Systems:  Pertinent items are noted in the History of Present Illness. Objective:     Vitals:    04/26/23 1440   BP: 110/64   Pulse: 86   Temp: 96.8 °F (36 °C)   TempSrc: Temporal   SpO2: 98%   Weight: 194 lb 9.6 oz (88.3 kg)   Height: 5' 7.5\" (1.715 m)        Physical Exam:  GENERAL: alert, cooperative, no distress, appears stated age  LUNG: clear to auscultation bilaterally  HEART: regular rate and rhythm  EXTREMITIES:  extremities normal, atraumatic, no cyanosis or edema    Neurologic Exam:  Mental Status:  Alert and oriented x 4. Appropriate affect, mood and behavior. Language:    Normal fluency, repetition, comprehension and naming. Cranial Nerves:   EOMI, PERRLA      Facial sensation intact V1 - V3. Full facial strength, no asymmetry. Hearing intact bilaterally. No dysarthria. Tongue protrudes to midline  symmetrically. Shoulder shrug 5/5 bilaterally. Motor:    No pronator drift. Bulk and tone normal.      5/5 power in all extremities proximally and distally. No involuntary movements. Sensation:    Sensation intact throughout to light touch    Reflexes:    Deferred    Coordination & Gait: Normal      My interpretation of Imaging:     I personally reviewed the CT angiogram and MRI studies and discussed the findings with the patient. CT of the head/neck 01/23:  Multifocal moderate to severe stenoses of the posterior communicating artery and  posterior cerebral artery on the right. Moderate atherosclerotic plaque of the common carotid artery on the right with  mild stenosis of the proximal right ICA. There is no major vessel occlusion. There is no aneurysm or dissection identified. MRI brain 01/23:  No acute intracranial finding/acute ischemic changes identified.     Assessment:     I agree with the assessment of the CTA that there appears to be intracranial atherosclerotic disease most conspicuous in the right posterior cerebral artery distribution. Patient has done well subsequent to discharge and is being well managed with medical therapy, including antiplatelets and statins. No new interval TIA/strokelike events reported by the patient. In my opinion, given the distal location of the atherosclerotic changes and patient being well managed by maximal medical therapy, no additional neurovascular intervention is recommended. Given the results of the [facility] trial, angioplasty and stenting are not considered a first line therapy and I would therefore recommend maximal medical therapy as prescribed by [facility] with dual antiplatelet therapy (Aspirin and Plavix) combined with aggressive risk factor modification (Statin and blood pressure control). Ronit Riojas et al., Stenting versus aggressive medical therapy for intracranial arterial stenosis, [facility] 4385 of Medicine, vol. 365, no. 11, pp. [phone], 2011     Plan:     No additional neurovascular intervention is recommended. Patient advised to follow-up with Dr. Alli Guzman for medical management of her intracranial atherosclerotic disease. She does report some generalized muscle weakness/soreness, which could potentially be from the statins. Patient advised to follow recommendations from stroke neurology regarding medication management/change. Patient understands and agrees to the plan. All her questions answered to her satisfaction. Thank you for allowing me to participate in the care of this patient. Greater than 45 minutes were spent in patient management, greater than half of which was spent in counseling and coordination of care.         Signed By: Dianne Barboza MD, MBA  Neuro-endovascular Surgery  Director - [facility] Stroke center, [facility]   of Radiology, [facility]      May 1, 2023

## 2023-05-24 ENCOUNTER — OFFICE VISIT (OUTPATIENT)
Age: 68
End: 2023-05-24
Payer: MEDICARE

## 2023-05-24 VITALS
HEIGHT: 68 IN | BODY MASS INDEX: 29.86 KG/M2 | HEART RATE: 86 BPM | WEIGHT: 197 LBS | OXYGEN SATURATION: 97 % | SYSTOLIC BLOOD PRESSURE: 138 MMHG | DIASTOLIC BLOOD PRESSURE: 62 MMHG

## 2023-05-24 DIAGNOSIS — E11.59 TYPE 2 DIABETES MELLITUS WITH OTHER CIRCULATORY COMPLICATION, WITHOUT LONG-TERM CURRENT USE OF INSULIN (HCC): ICD-10-CM

## 2023-05-24 DIAGNOSIS — G45.9 TIA (TRANSIENT ISCHEMIC ATTACK): ICD-10-CM

## 2023-05-24 DIAGNOSIS — I50.20 HFREF (HEART FAILURE WITH REDUCED EJECTION FRACTION) (HCC): Primary | ICD-10-CM

## 2023-05-24 PROCEDURE — 99204 OFFICE O/P NEW MOD 45 MIN: CPT | Performed by: STUDENT IN AN ORGANIZED HEALTH CARE EDUCATION/TRAINING PROGRAM

## 2023-05-24 RX ORDER — ASPIRIN 81 MG/1
TABLET, CHEWABLE ORAL
Qty: 90 TABLET | Refills: 3 | Status: SHIPPED | OUTPATIENT
Start: 2023-05-24

## 2023-05-24 RX ORDER — CLOPIDOGREL BISULFATE 75 MG/1
75 TABLET ORAL DAILY
COMMUNITY
Start: 2023-03-13 | End: 2023-05-24 | Stop reason: SDUPTHER

## 2023-05-24 RX ORDER — CLOPIDOGREL BISULFATE 75 MG/1
75 TABLET ORAL DAILY
Qty: 90 TABLET | Refills: 3 | Status: SHIPPED | OUTPATIENT
Start: 2023-05-24

## 2023-05-24 RX ORDER — ASPIRIN 81 MG/1
TABLET, CHEWABLE ORAL
COMMUNITY
Start: 2023-03-13 | End: 2023-05-24 | Stop reason: SDUPTHER

## 2023-05-24 RX ORDER — ATORVASTATIN CALCIUM 10 MG/1
10 TABLET, FILM COATED ORAL DAILY
COMMUNITY
Start: 2023-04-11

## 2023-05-24 NOTE — PROGRESS NOTES
Refill per VO of Dr. Carrie Mercer.  Naz Alexs:    Last appt:  Visit date not found    Future Appointments   Date Time Provider Russell Gomez   6/20/2023  2:40 PM Annabella Marie MD [facility] EAST BS AMB   7/20/2023  9:30 AM [facility] NUCLEAR 1 [facility] BS AMB   7/20/2023  1:30 PM [facility] ECHO 2 [facility] BS AMB   7/20/2023  2:40 PM Joce Matthews DO CAV BS AMB       Requested Prescriptions     Pending Prescriptions Disp Refills    empagliflozin (JARDIANCE) 10 MG tablet 30 tablet 0     Sig: Take 1 tablet by mouth daily    clopidogrel (PLAVIX) 75 MG tablet 90 tablet 3     Sig: Take 1 tablet by mouth daily    aspirin 81 MG chewable tablet 90 tablet 3     Sig: CHEW AND SWALLOW 1 TABLET BY MOUTH DAILY FOR STROKE PREVENTION

## 2023-05-24 NOTE — PROGRESS NOTES
Alec Llanes is a 80 y.o. female    had concerns including Follow-up (TIA). Vitals:    05/24/23 1132   BP: 138/62   Site: Left Upper Arm   Position: Sitting   Pulse: 86   SpO2: 97%   Weight: 197 lb (89.4 kg)   Height: 5' 7.5\" (1.715 m)        Chest pain no    SOB no    Dizziness balance issues has an appointment in 8/2023 with PCP     Swelling no    Refills Lipitor, Plavix     Covid Vaccinated  no      1. Have you been to the ER, urgent care clinic since your last visit? Hospitalized since your last visit? ED 3/11 CVA    2. Have you seen or consulted any other health care providers outside of the [address] [place] [address] since your last visit? Include any pap smears or colon screening.  no

## 2023-05-24 NOTE — PROGRESS NOTES
Melissa Weber, DO  [address] [facility], [address] [place] [address], [address] [place] [address]    Office [phone],JNB [phone]           Neisha Mcclelland is a 80 y.o. female presents to the office to establish care. Assessment/Recommendations:     Diagnosis Orders   1. HFrEF (heart failure with reduced ejection fraction) ([facility])  Transthoracic echocardiogram (TTE) limited with contrast, bubble, strain, and 3D PRN    Nuclear stress test with myocardial perfusion      2. Type 2 diabetes mellitus with other circulatory complication, without long-term current use of insulin [facility])  External Referral To Endocrinology      3. TIA (transient ischemic attack)            Mildly reduced LV function. LVEF w/ tia 40-45%      T2DM  Hemoglobin A1C   Date Value Ref Range Status   03/12/2023 8.6 (H) 4.0 - 5.6 % Final     Comment:     NEW METHOD  PLEASE NOTE NEW REFERENCE RANGE  (NOTE)  HbA1C Interpretive Ranges  <5.7              Normal  5.7 - 6.4         Consider Prediabetes  >6.5              Consider Diabetes       TIA 3/2023. MRI without areas of infarction. CT angiography showed Multifocal moderate to severe stenoses of the posterior communicating artery and posterior cerebral artery on the right. Moderate atherosclerotic plaque of the common carotid artery on the right with mild stenosis of the proximal right ICA. ? CHAR-consider sleep referral       - repeat echo and obtain exercise cardiolite. Add coreg and entresto based on repeat echo findings  - cont DAPT  - cont atorvastion 10mg/d. Titrate to goal ldl < 55  - add jardiance 10mg/d  - referral to endocrinology. Discussed with patient that longstanding uncontrolled diabetes is likely etiology of her underlying vascular disease. Primary Care Physician- Greta Manzanares MD    Follow-up after completion above testing        Subjective:  80 y.o. presents the office to establish care.   Recently had TIA symptoms related to probable underlying

## 2023-06-21 RX ORDER — EMPAGLIFLOZIN 10 MG/1
TABLET, FILM COATED ORAL
Qty: 90 TABLET | Refills: 3 | Status: SHIPPED | OUTPATIENT
Start: 2023-06-21

## 2023-06-21 NOTE — TELEPHONE ENCOUNTER
Refill per VO of Dr. Leona Romo.  Yeimy Fraction:    Last appt:  5/24/2023    Future Appointments   Date Time Provider Russell Vincetni   7/20/2023  9:30 AM [facility] NUCLEAR 1 [facility] BS AMB   7/20/2023  1:30 PM [facility] ECHO 2 CAV[facility] BS AMB   7/20/2023  2:40 PM Saulo Gross DO CAVS BS AMB       Requested Prescriptions     Pending Prescriptions Disp Refills    JARDIANCE 10 MG tablet [Pharmacy Med Name: JARDIANCE 10MG TABLETS] 30 tablet 0     Sig: TAKE 1 TABLET BY MOUTH DAILY

## 2024-02-23 ENCOUNTER — TRANSCRIBE ORDERS (OUTPATIENT)
Facility: HOSPITAL | Age: 69
End: 2024-02-23

## 2024-02-23 DIAGNOSIS — Z12.31 VISIT FOR SCREENING MAMMOGRAM: Primary | ICD-10-CM

## 2024-04-15 ENCOUNTER — HOSPITAL ENCOUNTER (OUTPATIENT)
Facility: HOSPITAL | Age: 69
Discharge: HOME | End: 2024-04-18
Payer: MEDICARE

## 2024-04-15 VITALS — HEIGHT: 68 IN | WEIGHT: 197 LBS | BODY MASS INDEX: 29.86 KG/M2

## 2024-04-15 DIAGNOSIS — Z12.31 VISIT FOR SCREENING MAMMOGRAM: ICD-10-CM

## 2024-04-15 PROCEDURE — 77063 BREAST TOMOSYNTHESIS BI: CPT

## 2024-07-02 RX ORDER — CLOPIDOGREL BISULFATE 75 MG/1
75 TABLET ORAL DAILY
Qty: 90 TABLET | Refills: 0 | Status: SHIPPED | OUTPATIENT
Start: 2024-07-02

## 2024-07-02 NOTE — TELEPHONE ENCOUNTER
Refill per VO of Dr. SILVANA Muñoz, OD:    Last appt:  5/24/2023    No future appointments.    Requested Prescriptions     Pending Prescriptions Disp Refills    clopidogrel (PLAVIX) 75 MG tablet [Pharmacy Med Name: CLOPIDOGREL 75MG TABLETS] 90 tablet 3     Sig: TAKE 1 TABLET BY MOUTH DAILY

## 2024-08-28 RX ORDER — EMPAGLIFLOZIN 10 MG/1
TABLET, FILM COATED ORAL
Qty: 90 TABLET | Refills: 3 | OUTPATIENT
Start: 2024-08-28

## 2024-10-01 ENCOUNTER — TELEPHONE (OUTPATIENT)
Age: 69
End: 2024-10-01

## 2024-10-01 DIAGNOSIS — I50.20 HFREF (HEART FAILURE WITH REDUCED EJECTION FRACTION) (HCC): Primary | ICD-10-CM

## 2024-10-01 NOTE — TELEPHONE ENCOUNTER
Patient requested a refill on jardiance 10mg, patient is scheduled for follow up on 10/30/24.          Future Appointments   Date Time Provider Department Center   [facility]   10/30/2024 11:40 AM Joce Muñoz,  CAV[facility] BS [place] [phone]      Pt#   [phone]

## 2024-10-02 ENCOUNTER — TELEPHONE (OUTPATIENT)
Age: 69
End: 2024-10-02

## 2024-10-02 NOTE — TELEPHONE ENCOUNTER
Unable to reach pt. Message left detailed message explaining Nuclear stress instructions, and I e mailed a copy of the instruction to her at [email or web address]

## 2024-10-02 NOTE — TELEPHONE ENCOUNTER
Refill per VO of Dr. SILVANA Muñoz, OD:    Last appt:  5/24/2023    Future Appointments   Date Time Provider Department Center   [facility]24 12[facility]   10/30/2024 11:40 AM Joce Muñoz DO [facility] AMB       Requested Prescriptions      No prescriptions requested or ordered in this encounter

## 2024-10-09 ENCOUNTER — ANCILLARY PROCEDURE (OUTPATIENT)
Age: 69
End: 2024-10-09
Payer: MEDICARE

## 2024-10-09 VITALS — BODY MASS INDEX: 27.92 KG/M2 | WEIGHT: 184.2 LBS | HEIGHT: 68 IN

## 2024-10-09 DIAGNOSIS — I50.20 HFREF (HEART FAILURE WITH REDUCED EJECTION FRACTION) (HCC): ICD-10-CM

## 2024-10-09 PROCEDURE — A9500 TC99M SESTAMIBI: HCPCS | Performed by: STUDENT IN AN ORGANIZED HEALTH CARE EDUCATION/TRAINING PROGRAM

## 2024-10-09 PROCEDURE — 93017 CV STRESS TEST TRACING ONLY: CPT | Performed by: STUDENT IN AN ORGANIZED HEALTH CARE EDUCATION/TRAINING PROGRAM

## 2024-10-09 RX ORDER — TETRAKIS(2-METHOXYISOBUTYLISOCYANIDE)COPPER(I) TETRAFLUOROBORATE 1 MG/ML
8.5 INJECTION, POWDER, LYOPHILIZED, FOR SOLUTION INTRAVENOUS
Status: COMPLETED | OUTPATIENT
Start: 2024-10-09 | End: 2024-10-09

## 2024-10-09 RX ORDER — TETRAKIS(2-METHOXYISOBUTYLISOCYANIDE)COPPER(I) TETRAFLUOROBORATE 1 MG/ML
25.7 INJECTION, POWDER, LYOPHILIZED, FOR SOLUTION INTRAVENOUS
Status: COMPLETED | OUTPATIENT
Start: 2024-10-09 | End: 2024-10-09

## 2024-10-09 RX ADMIN — TECHNETIUM TC-99M SESTAMIBI 25.7 MILLICURIE: 1 INJECTION INTRAVENOUS at 14:10

## 2024-10-09 RX ADMIN — TECHNETIUM TC-99M SESTAMIBI 8.5 MILLICURIE: 1 INJECTION INTRAVENOUS at 13:05

## 2024-10-11 LAB
ECHO BSA: 1.99 M2
NUC STRESS EJECTION FRACTION: 54 %
STRESS ANGINA INDEX: 0
STRESS BASELINE DIAS BP: 70 MMHG
STRESS BASELINE HR: 86 BPM
STRESS BASELINE SYS BP: 110 MMHG
STRESS ESTIMATED WORKLOAD: 6.3 METS
STRESS EXERCISE DUR MIN: 5 MIN
STRESS EXERCISE DUR SEC: 1 SEC
STRESS O2 SAT PEAK: 98 %
STRESS O2 SAT REST: 98 %
STRESS PEAK DIAS BP: 70 MMHG
STRESS PEAK SYS BP: 152 MMHG
STRESS PERCENT HR ACHIEVED: 85 %
STRESS POST PEAK HR: 129 BPM
STRESS RATE PRESSURE PRODUCT: NORMAL BPM*MMHG
STRESS SR DUKE TREADMILL SCORE: 5
STRESS ST DEPRESSION: 0 MM
STRESS TARGET HR: 152 BPM
TID: 1.21

## 2024-10-11 PROCEDURE — 78452 HT MUSCLE IMAGE SPECT MULT: CPT | Performed by: STUDENT IN AN ORGANIZED HEALTH CARE EDUCATION/TRAINING PROGRAM

## 2024-10-11 PROCEDURE — PBSHW PBB SHADOW CHARGE: Performed by: STUDENT IN AN ORGANIZED HEALTH CARE EDUCATION/TRAINING PROGRAM

## 2024-10-11 PROCEDURE — 93016 CV STRESS TEST SUPVJ ONLY: CPT | Performed by: STUDENT IN AN ORGANIZED HEALTH CARE EDUCATION/TRAINING PROGRAM

## 2024-10-11 PROCEDURE — 93018 CV STRESS TEST I&R ONLY: CPT | Performed by: STUDENT IN AN ORGANIZED HEALTH CARE EDUCATION/TRAINING PROGRAM

## 2024-10-14 RX ORDER — CLOPIDOGREL BISULFATE 75 MG/1
75 TABLET ORAL DAILY
Qty: 90 TABLET | Refills: 0 | Status: SHIPPED | OUTPATIENT
Start: 2024-10-14

## 2024-10-17 ENCOUNTER — TELEPHONE (OUTPATIENT)
Age: 69
End: 2024-10-17

## 2024-10-17 DIAGNOSIS — R94.39 CARDIOVASCULAR STRESS TEST ABNORMAL: Primary | ICD-10-CM

## 2024-10-17 NOTE — TELEPHONE ENCOUNTER
JOSEFA received from Dr. FITZ Muñoz DO:  Please let patient know that I recommend we proceed with coronary CTA to further assess coronary arteries.  Mild abnormalities noted on stress test.       ################################    Unable to reach pt. Message left with HIPPA compliant message and call back number.      ################################      Unable to reach pt. Message left with HIPPA compliant message and call back number    ################################

## 2024-10-21 ENCOUNTER — TELEPHONE (OUTPATIENT)
Age: 69
End: 2024-10-21

## 2024-10-21 RX ORDER — ATENOLOL 25 MG/1
TABLET ORAL
Qty: 3 TABLET | Refills: 0 | Status: SHIPPED | OUTPATIENT
Start: 2024-10-21

## 2024-10-21 NOTE — TELEPHONE ENCOUNTER
Spoke with the pt, identified the pt with name and .  I informed the pt that Dr. uMñoz is recommending a Coronary CTA to further investigate some abnormal findings on her Nuclear stress test. I was unable to adequately explain to her what those abnormalities were, but assured her that it isn't an urgent matter or Dr. Muñoz would have called her personally. Pt was agreeable to the test, but want's Dr. Muñoz to call her to fully explain the results. I told her I would forward the message to him, but d/t his covering the hospital all week I can't say what his schedule is like. Pt verbalized understanding.  I also gave her the atenolol instructions and verified her pharmacy. Pt had no further questions at this time.

## 2024-11-01 ENCOUNTER — TELEPHONE (OUTPATIENT)
Age: 69
End: 2024-11-01

## 2024-11-01 NOTE — TELEPHONE ENCOUNTER
BS scheduling called stating the the CTA order is flagging ABN Triggers. Orders diagnosis needs to be changed.     # [phone]

## 2024-11-01 NOTE — TELEPHONE ENCOUNTER
Spoke with the pt, identified the pt with name and .  Pt returned my call about the CTA Dr. Muñoz ordered. I gave her the number to call central scheduling and I gave her atenolol instructions.    Pt said that she's scare to take Jardiance d/t a tik angelita video she saw in which a womans father  of gangreen of the perinum after taking Jardiance. I tried to explain to her that there is a chance that Jardiance will increase the chance of UTI, but if you experience symptoms ( I listed) then you get tested and treated. I advised that the story she saw was over sensationalized, and if the man had  of such thing it would be d/t ignoring S&S and not getting treatment.  I asked her who was managing her diabetes. Pt reported no one at the moment. I gave her the phone number for [facility] told her to call and get established and if she needs a referral to let me know.I gave Her time for questions, to which I answered to the best of my ability.  the pt, expressed understanding and agreement. Pt has no further questions or concerns at this time.

## 2024-11-07 ENCOUNTER — TELEPHONE (OUTPATIENT)
Age: 69
End: 2024-11-07

## 2024-11-07 NOTE — TELEPHONE ENCOUNTER
Patient called and said wrong code for cat scan was put in so appointment couldn't be scheduled, please follow with patient     P#: [phone]

## 2024-11-11 RX ORDER — EMPAGLIFLOZIN 10 MG/1
10 TABLET, FILM COATED ORAL DAILY
Qty: 45 TABLET | Refills: 0 | Status: SHIPPED | OUTPATIENT
Start: 2024-11-11

## 2024-11-11 NOTE — TELEPHONE ENCOUNTER
Refill per VO of Dr. SILVANA Muñoz, OD:    Last appt:  5/24/2023    Future Appointments   Date Time Provider Department Center   12/16/2024 11:40 AM Joce Muñoz DO [facility] BS AMB       Requested Prescriptions     Pending Prescriptions Disp Refills    JARDIANCE 10 MG tablet [Pharmacy Med Name: JARDIANCE 10MG TABLETS] 30 tablet 0     Sig: TAKE 1 TABLET BY MOUTH DAILY

## 2024-11-15 ENCOUNTER — TELEPHONE (OUTPATIENT)
Age: 69
End: 2024-11-15

## 2024-11-15 NOTE — TELEPHONE ENCOUNTER
Patient called in about two things, she says she wants to clarify with the nurse if the code for CAT scan has been updated and will she be able to schedule now. She also stated that she needs a refill on two of her medications (Clopidogrel) (Jardiance).      [facility] [phone]    ##################################    Spoke with the pt, identified the pt with name and .  Informed her I did resubmit the CTA order on24, using every Dx I had on her, they should take at least one of them. If she still can't get scheduled then we'll address it with Dr. Muñoz at her appointment.    I also informed her of the orders sent to [facility] and that she should have enough to get her through until her appointment.I gave Her time for questions, to which I answered to the best of my ability.  the pt, expressed understanding and agreement. Pt has no further questions or concerns at this time.

## 2024-11-15 NOTE — TELEPHONE ENCOUNTER
Patient called in about two things, she says she wants to clarify with the nurse if the code for CAT scan has been updated and will she be able to schedule now. She also stated that she needs a refill on two of her medications (Clopidogrel) (Jardiance).      [facility] [phone]

## 2024-12-30 ENCOUNTER — TELEPHONE (OUTPATIENT)
Age: 69
End: 2024-12-30

## 2024-12-30 NOTE — TELEPHONE ENCOUNTER
Appointment reminder call, LM patient was told to call if appt needs to be rescheduled 12/30/24 tm

## 2025-03-07 RX ORDER — EMPAGLIFLOZIN 10 MG/1
10 TABLET, FILM COATED ORAL DAILY
Qty: 45 TABLET | Refills: 0 | Status: SHIPPED | OUTPATIENT
Start: 2025-03-07

## 2025-03-07 NOTE — TELEPHONE ENCOUNTER
Per verbal order from Dr. Dago Muñoz  Last appt: 5/24/2023     Future Appointments   Date Time Provider Department Center   3/12/2025 11:00 AM [facility] CT 1 [facility]   4/7/2025  3:00 PM Dago Muñoz, DO [facility] BS AMB       Requested Prescriptions     Signed Prescriptions Disp Refills    JARDIANCE 10 MG tablet 45 tablet 0     Sig: TAKE 1 TABLET BY MOUTH DAILY     Authorizing Provider: DAGO MUÑOZ     Ordering User: LAQUITA CHERY

## 2025-03-10 DIAGNOSIS — R94.39 CARDIOVASCULAR STRESS TEST ABNORMAL: ICD-10-CM

## 2025-03-10 RX ORDER — ATENOLOL 25 MG/1
TABLET ORAL
Qty: 3 TABLET | Refills: 0 | Status: SHIPPED | OUTPATIENT
Start: 2025-03-10

## 2025-03-10 RX ORDER — CLOPIDOGREL BISULFATE 75 MG/1
75 TABLET ORAL DAILY
Qty: 30 TABLET | Refills: 0 | Status: SHIPPED | OUTPATIENT
Start: 2025-03-10

## 2025-03-10 NOTE — TELEPHONE ENCOUNTER
Per verbal order from Dr. Dago Muñoz  Last appt: 5/24/2023     Future Appointments   Date Time Provider Department Center   3/12/2025 11:00 AM [facility] CT 1 [facility]   4/7/2025  3:00 PM Dago Muñoz, DO [facility] BS AMB       Requested Prescriptions     Signed Prescriptions Disp Refills    clopidogrel (PLAVIX) 75 MG tablet 30 tablet 0     Sig: Take 1 tablet by mouth daily     Authorizing Provider: DAGO MUÑOZ     Ordering User: LAQUITA CHERY

## 2025-03-11 RX ORDER — IOPAMIDOL 755 MG/ML
100 INJECTION, SOLUTION INTRAVASCULAR
Status: COMPLETED | OUTPATIENT
Start: 2025-03-11 | End: 2025-03-12

## 2025-03-12 ENCOUNTER — HOSPITAL ENCOUNTER (OUTPATIENT)
Facility: HOSPITAL | Age: 70
Discharge: HOME | End: 2025-03-15
Attending: STUDENT IN AN ORGANIZED HEALTH CARE EDUCATION/TRAINING PROGRAM
Payer: MEDICARE

## 2025-03-12 VITALS — DIASTOLIC BLOOD PRESSURE: 75 MMHG | OXYGEN SATURATION: 98 % | SYSTOLIC BLOOD PRESSURE: 114 MMHG | HEART RATE: 66 BPM

## 2025-03-12 DIAGNOSIS — R94.39 CARDIOVASCULAR STRESS TEST ABNORMAL: ICD-10-CM

## 2025-03-12 DIAGNOSIS — G45.9 TIA (TRANSIENT ISCHEMIC ATTACK): ICD-10-CM

## 2025-03-12 DIAGNOSIS — R94.30 LEFT VENTRICULAR EJECTION FRACTION GREATER THAN 40%: ICD-10-CM

## 2025-03-12 DIAGNOSIS — E11.59 TYPE 2 DIABETES MELLITUS WITH OTHER CIRCULATORY COMPLICATION, WITHOUT LONG-TERM CURRENT USE OF INSULIN (HCC): ICD-10-CM

## 2025-03-12 DIAGNOSIS — I50.20 HFREF (HEART FAILURE WITH REDUCED EJECTION FRACTION) (HCC): ICD-10-CM

## 2025-03-12 LAB
GLUCOSE BLD STRIP.AUTO-MCNC: 242 MG/DL (ref 65–117)
SERVICE CMNT-IMP: ABNORMAL

## 2025-03-12 PROCEDURE — 6360000004 HC RX CONTRAST MEDICATION: Performed by: STUDENT IN AN ORGANIZED HEALTH CARE EDUCATION/TRAINING PROGRAM

## 2025-03-12 PROCEDURE — 75574 CT ANGIO HRT W/3D IMAGE: CPT

## 2025-03-12 PROCEDURE — 82962 GLUCOSE BLOOD TEST: CPT

## 2025-03-12 PROCEDURE — 6370000000 HC RX 637 (ALT 250 FOR IP): Performed by: INTERNAL MEDICINE

## 2025-03-12 PROCEDURE — 82565 ASSAY OF CREATININE: CPT

## 2025-03-12 RX ORDER — NITROGLYCERIN 0.4 MG/1
0.8 TABLET SUBLINGUAL ONCE
Status: COMPLETED | OUTPATIENT
Start: 2025-03-12 | End: 2025-03-12

## 2025-03-12 RX ADMIN — IOPAMIDOL 80 ML: 755 INJECTION, SOLUTION INTRAVENOUS at 12:07

## 2025-03-12 RX ADMIN — NITROGLYCERIN 0.8 MG: 0.4 TABLET SUBLINGUAL at 12:00

## 2025-03-12 NOTE — PROGRESS NOTES
1120   Patient brought to  from waiting room for Coronary CT. Patient verified and pre-med taken and allergy of N/K confirmed. VS taken and stable. 20 G PIV placed in  Right AC. Istat draw and sent. CT staff aware patient is ready for exam. Patient requested checking for glucose level. Glucometer reading . Patient reported \"I haven't eat anything today and don't want to take Metformin without food.\"  Nirtoglycerin 0.8 mg SL given prior to CT. Patient ambulated to CT. Patient connected ECG and contrast injector.     1225  VS taken post procedure and stable. Patient denies pain or dizziness. Encouraged drink water and check for headache or dizziness. Patient ambulated to in front of hospital to meet her son.

## 2025-03-13 LAB — CREAT BLD-MCNC: 0.7 MG/DL (ref 0.6–1.3)

## 2025-03-17 ENCOUNTER — RESULTS FOLLOW-UP (OUTPATIENT)
Facility: HOSPITAL | Age: 70
End: 2025-03-17

## 2025-03-21 ENCOUNTER — TELEPHONE (OUTPATIENT)
Age: 70
End: 2025-03-21

## 2025-03-25 ENCOUNTER — OFFICE VISIT (OUTPATIENT)
Age: 70
End: 2025-03-25
Payer: MEDICARE

## 2025-03-25 VITALS
WEIGHT: 190 LBS | SYSTOLIC BLOOD PRESSURE: 119 MMHG | BODY MASS INDEX: 28.79 KG/M2 | HEART RATE: 88 BPM | HEIGHT: 68 IN | DIASTOLIC BLOOD PRESSURE: 60 MMHG

## 2025-03-25 DIAGNOSIS — R93.1 ABNORMAL COMPUTED TOMOGRAPHY ANGIOGRAPHY OF HEART: Primary | ICD-10-CM

## 2025-03-25 DIAGNOSIS — I50.22 CHRONIC SYSTOLIC HEART FAILURE (HCC): ICD-10-CM

## 2025-03-25 DIAGNOSIS — E11.59 TYPE 2 DIABETES MELLITUS WITH OTHER CIRCULATORY COMPLICATION, WITHOUT LONG-TERM CURRENT USE OF INSULIN: ICD-10-CM

## 2025-03-25 PROCEDURE — G2211 COMPLEX E/M VISIT ADD ON: HCPCS | Performed by: STUDENT IN AN ORGANIZED HEALTH CARE EDUCATION/TRAINING PROGRAM

## 2025-03-25 PROCEDURE — 2022F DILAT RTA XM EVC RTNOPTHY: CPT | Performed by: STUDENT IN AN ORGANIZED HEALTH CARE EDUCATION/TRAINING PROGRAM

## 2025-03-25 PROCEDURE — 99214 OFFICE O/P EST MOD 30 MIN: CPT | Performed by: STUDENT IN AN ORGANIZED HEALTH CARE EDUCATION/TRAINING PROGRAM

## 2025-03-25 PROCEDURE — 1126F AMNT PAIN NOTED NONE PRSNT: CPT | Performed by: STUDENT IN AN ORGANIZED HEALTH CARE EDUCATION/TRAINING PROGRAM

## 2025-03-25 PROCEDURE — G8400 PT W/DXA NO RESULTS DOC: HCPCS | Performed by: STUDENT IN AN ORGANIZED HEALTH CARE EDUCATION/TRAINING PROGRAM

## 2025-03-25 PROCEDURE — G8419 CALC BMI OUT NRM PARAM NOF/U: HCPCS | Performed by: STUDENT IN AN ORGANIZED HEALTH CARE EDUCATION/TRAINING PROGRAM

## 2025-03-25 PROCEDURE — 3046F HEMOGLOBIN A1C LEVEL >9.0%: CPT | Performed by: STUDENT IN AN ORGANIZED HEALTH CARE EDUCATION/TRAINING PROGRAM

## 2025-03-25 PROCEDURE — 1123F ACP DISCUSS/DSCN MKR DOCD: CPT | Performed by: STUDENT IN AN ORGANIZED HEALTH CARE EDUCATION/TRAINING PROGRAM

## 2025-03-25 PROCEDURE — 3017F COLORECTAL CA SCREEN DOC REV: CPT | Performed by: STUDENT IN AN ORGANIZED HEALTH CARE EDUCATION/TRAINING PROGRAM

## 2025-03-25 PROCEDURE — G8428 CUR MEDS NOT DOCUMENT: HCPCS | Performed by: STUDENT IN AN ORGANIZED HEALTH CARE EDUCATION/TRAINING PROGRAM

## 2025-03-25 PROCEDURE — 1090F PRES/ABSN URINE INCON ASSESS: CPT | Performed by: STUDENT IN AN ORGANIZED HEALTH CARE EDUCATION/TRAINING PROGRAM

## 2025-03-25 PROCEDURE — 1036F TOBACCO NON-USER: CPT | Performed by: STUDENT IN AN ORGANIZED HEALTH CARE EDUCATION/TRAINING PROGRAM

## 2025-03-25 RX ORDER — ATORVASTATIN CALCIUM 80 MG/1
80 TABLET, FILM COATED ORAL DAILY
Qty: 90 TABLET | Refills: 3 | Status: SHIPPED | OUTPATIENT
Start: 2025-03-25

## 2025-03-25 RX ORDER — CLOPIDOGREL BISULFATE 75 MG/1
75 TABLET ORAL DAILY
Qty: 90 TABLET | Refills: 3 | Status: SHIPPED | OUTPATIENT
Start: 2025-03-25

## 2025-03-25 NOTE — PROGRESS NOTES
/60   Pulse 88   Ht 1.715 m (5' 7.5\")   Wt 86.2 kg (190 lb)   BMI 29.32 kg/m²     Pt not currently taking ASA 81 mg and Lipitor.   
assess luminal stenosis due  to calcification.    5. The aortic valve does not demonstrate calcification. The mitral annulus does  not demonstrate calcification.    6. Visible portions of the ascending aorta does not demonstrate calcified  plaque. Visible portions of descending thoracic aorta does not demonstrate  calcified plaque.    7. The ascending aorta measures 31 mm.    8. There is a bright lesion in the posterior mitral annulus and posterior basal  inferolateral wall of the LV on noncontrast images and measures 14 x 22 mm. The  periphery of this lesion is lined with calcification. 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 There is no  communication with the LV or atrial cavity.    9. The extracardiac portion of this test will be read separately by staff  radiologist.      Total coronary calcium score: 3094    Calcium score interpretation:  0-0 = No evidence of CAD  1-10 = Minimal evidence of CAD   = Mild evidence of CAD  101-400 = Moderate evidence of CAD  >400 = Extensive evidence of CAD      Electronically signed by ZEV JARA                ATTENTION:   This medical record was transcribed using an electronic medical records/speech recognition system.  Although proofread, it may and can contain electronic, spelling and other errors.  Corrections may be executed at a later time.  Please feel free to contact us for any clarifications as needed.

## 2025-03-25 NOTE — H&P (VIEW-ONLY)
Jo[place], [place]  [address], [address]  [place] [address]    Office [phone],Fax [phone]           Ping Salazar is a 69 y.o. female presents to the office for follow-up visit      Assessment/Recommendations:     Diagnosis Orders   1. Abnormal computed tomography angiography of heart  Lipid Panel    Hemoglobin A1C    CBC    Comprehensive Metabolic Panel    TSH    Brain Natriuretic Peptide    Echo (TTE) complete (PRN contrast/bubble/strain/3D)    CHARLES Easton Manning MD, Sleep Medicine, [facility]      2. Chronic systolic heart failure (HCC)  Lipid Panel    Hemoglobin A1C    CBC    Comprehensive Metabolic Panel    TSH    Brain Natriuretic Peptide    Echo (TTE) complete (PRN contrast/bubble/strain/3D)    Easton Lofton MD, Sleep Medicine, [facility]      3. Type 2 diabetes mellitus with other circulatory complication, without long-term current use of insulin ([facility])          Coronary atherosclerosis.  Recent coronary CTA showed calcium score of greater than 3000 with severe diffuse calcification    Hx of Mildly reduced LV function.  LVEF w/ tia 3/2023 40-45%.  Based on symptoms, suspect she    T2DM, uncontrolled    TIA 3/2023.  MRI without areas of infarction.  CT angiography showed Multifocal moderate to severe stenoses of the posterior communicating artery and posterior cerebral artery on the right. Moderate atherosclerotic plaque of the common carotid artery on the right with mild stenosis of the proximal right ICA.    CHAR based on history    -Adjust goal-directed medical therapy based on echocardiographic findings  - repeat echo   -Recommend cardiac catheterization for further ischemic evaluation  - cont Plavix 75 mg daily  -Atorvastatin 80 mg daily  -Continue jardiance 10mg/d along with metformin  -Referral to primary care  -Referral for sleep study  -Repeat

## 2025-03-26 ENCOUNTER — TELEPHONE (OUTPATIENT)
Age: 70
End: 2025-03-26

## 2025-03-26 ENCOUNTER — RESULTS FOLLOW-UP (OUTPATIENT)
Age: 70
End: 2025-03-26

## 2025-03-26 DIAGNOSIS — I50.22 CHRONIC SYSTOLIC HEART FAILURE (HCC): ICD-10-CM

## 2025-03-26 DIAGNOSIS — R93.1 ABNORMAL CT SCAN OF HEART: ICD-10-CM

## 2025-03-26 DIAGNOSIS — R93.1 ABNORMAL HEART SCORE CT: Primary | ICD-10-CM

## 2025-03-26 DIAGNOSIS — R93.1 ABNORMAL COMPUTED TOMOGRAPHY ANGIOGRAPHY OF HEART: ICD-10-CM

## 2025-03-26 NOTE — TELEPHONE ENCOUNTER
Spoke with Pt regarding [facility]  schd. For 4/11/2025 At 1:00 PM arrive at 11:30 AM. Pt aware that they need a  they must stay on site NPO from Midnight the night before. You can have clear liquids up to 2 hours prior to procedure. Check in at the second floor Outpt. Reg. Desk. Pt is to have Labs done by 4/4/2025.     Medications:  Hold Jardiance the day of the procedure  Hold Metformin the day before and the day of the procedure      VO by /nurse: Unique

## 2025-03-27 ENCOUNTER — DIRECT ADMIT ORDERS (OUTPATIENT)
Age: 70
End: 2025-03-27

## 2025-03-27 DIAGNOSIS — R93.1 ABNORMAL CT SCAN OF HEART: Primary | ICD-10-CM

## 2025-03-27 LAB
ALBUMIN SERPL-MCNC: 3.7 G/DL (ref 3.5–5)
ALBUMIN/GLOB SERPL: 1 (ref 1.1–2.2)
ALP SERPL-CCNC: 81 U/L (ref 45–117)
ALT SERPL-CCNC: 20 U/L (ref 12–78)
ANION GAP SERPL CALC-SCNC: 9 MMOL/L (ref 2–12)
AST SERPL-CCNC: 11 U/L (ref 15–37)
BILIRUB SERPL-MCNC: 0.3 MG/DL (ref 0.2–1)
BUN SERPL-MCNC: 15 MG/DL (ref 6–20)
BUN/CREAT SERPL: 19 (ref 12–20)
CALCIUM SERPL-MCNC: 9.4 MG/DL (ref 8.5–10.1)
CHLORIDE SERPL-SCNC: 104 MMOL/L (ref 97–108)
CHOLEST SERPL-MCNC: 190 MG/DL
CO2 SERPL-SCNC: 26 MMOL/L (ref 21–32)
CREAT SERPL-MCNC: 0.79 MG/DL (ref 0.55–1.02)
ERYTHROCYTE [DISTWIDTH] IN BLOOD BY AUTOMATED COUNT: 13.2 % (ref 11.5–14.5)
EST. AVERAGE GLUCOSE BLD GHB EST-MCNC: 212 MG/DL
GLOBULIN SER CALC-MCNC: 3.8 G/DL (ref 2–4)
GLUCOSE SERPL-MCNC: 160 MG/DL (ref 65–100)
HBA1C MFR BLD: 9 % (ref 4–5.6)
HCT VFR BLD AUTO: 43.3 % (ref 35–47)
HDLC SERPL-MCNC: 44 MG/DL
HDLC SERPL: 4.3 (ref 0–5)
HGB BLD-MCNC: 14.3 G/DL (ref 11.5–16)
LDLC SERPL CALC-MCNC: 96 MG/DL (ref 0–100)
MCH RBC QN AUTO: 30.4 PG (ref 26–34)
MCHC RBC AUTO-ENTMCNC: 33 G/DL (ref 30–36.5)
MCV RBC AUTO: 91.9 FL (ref 80–99)
NRBC # BLD: 0 K/UL (ref 0–0.01)
NRBC BLD-RTO: 0 PER 100 WBC
NT PRO BNP: 146 PG/ML
PLATELET # BLD AUTO: 282 K/UL (ref 150–400)
PMV BLD AUTO: 9.5 FL (ref 8.9–12.9)
POTASSIUM SERPL-SCNC: 3.9 MMOL/L (ref 3.5–5.1)
PROT SERPL-MCNC: 7.5 G/DL (ref 6.4–8.2)
RBC # BLD AUTO: 4.71 M/UL (ref 3.8–5.2)
SODIUM SERPL-SCNC: 139 MMOL/L (ref 136–145)
TRIGL SERPL-MCNC: 250 MG/DL
TSH SERPL DL<=0.05 MIU/L-ACNC: 1.09 UIU/ML (ref 0.36–3.74)
VLDLC SERPL CALC-MCNC: 50 MG/DL
WBC # BLD AUTO: 8.7 K/UL (ref 3.6–11)

## 2025-03-27 RX ORDER — SODIUM CHLORIDE 0.9 % (FLUSH) 0.9 %
5-40 SYRINGE (ML) INJECTION EVERY 12 HOURS SCHEDULED
OUTPATIENT
Start: 2025-04-11 | End: 2025-04-11

## 2025-03-27 RX ORDER — SODIUM CHLORIDE 0.9 % (FLUSH) 0.9 %
5-40 SYRINGE (ML) INJECTION PRN
OUTPATIENT
Start: 2025-04-11

## 2025-03-27 RX ORDER — SODIUM CHLORIDE 9 MG/ML
INJECTION, SOLUTION INTRAVENOUS PRN
OUTPATIENT
Start: 2025-04-11

## 2025-04-16 ENCOUNTER — HOSPITAL ENCOUNTER (OUTPATIENT)
Facility: HOSPITAL | Age: 70
Setting detail: OUTPATIENT SURGERY
Discharge: HOME | End: 2025-04-16
Attending: STUDENT IN AN ORGANIZED HEALTH CARE EDUCATION/TRAINING PROGRAM | Admitting: STUDENT IN AN ORGANIZED HEALTH CARE EDUCATION/TRAINING PROGRAM
Payer: MEDICARE

## 2025-04-16 VITALS
HEART RATE: 83 BPM | RESPIRATION RATE: 16 BRPM | TEMPERATURE: 97.8 F | OXYGEN SATURATION: 92 % | DIASTOLIC BLOOD PRESSURE: 62 MMHG | HEIGHT: 67 IN | BODY MASS INDEX: 29.38 KG/M2 | WEIGHT: 187.2 LBS | SYSTOLIC BLOOD PRESSURE: 114 MMHG

## 2025-04-16 DIAGNOSIS — R93.1 ABNORMAL CT SCAN OF HEART: ICD-10-CM

## 2025-04-16 LAB
ECHO BSA: 2 M2
GLUCOSE BLD STRIP.AUTO-MCNC: 250 MG/DL (ref 65–117)
SERVICE CMNT-IMP: ABNORMAL

## 2025-04-16 PROCEDURE — 6360000004 HC RX CONTRAST MEDICATION: Performed by: STUDENT IN AN ORGANIZED HEALTH CARE EDUCATION/TRAINING PROGRAM

## 2025-04-16 PROCEDURE — C1894 INTRO/SHEATH, NON-LASER: HCPCS | Performed by: STUDENT IN AN ORGANIZED HEALTH CARE EDUCATION/TRAINING PROGRAM

## 2025-04-16 PROCEDURE — 6360000002 HC RX W HCPCS: Performed by: STUDENT IN AN ORGANIZED HEALTH CARE EDUCATION/TRAINING PROGRAM

## 2025-04-16 PROCEDURE — 82962 GLUCOSE BLOOD TEST: CPT

## 2025-04-16 PROCEDURE — C1769 GUIDE WIRE: HCPCS | Performed by: STUDENT IN AN ORGANIZED HEALTH CARE EDUCATION/TRAINING PROGRAM

## 2025-04-16 PROCEDURE — 99152 MOD SED SAME PHYS/QHP 5/>YRS: CPT | Performed by: STUDENT IN AN ORGANIZED HEALTH CARE EDUCATION/TRAINING PROGRAM

## 2025-04-16 PROCEDURE — 2709999900 HC NON-CHARGEABLE SUPPLY: Performed by: STUDENT IN AN ORGANIZED HEALTH CARE EDUCATION/TRAINING PROGRAM

## 2025-04-16 PROCEDURE — 93458 L HRT ARTERY/VENTRICLE ANGIO: CPT | Performed by: STUDENT IN AN ORGANIZED HEALTH CARE EDUCATION/TRAINING PROGRAM

## 2025-04-16 PROCEDURE — 2500000003 HC RX 250 WO HCPCS: Performed by: STUDENT IN AN ORGANIZED HEALTH CARE EDUCATION/TRAINING PROGRAM

## 2025-04-16 PROCEDURE — 76937 US GUIDE VASCULAR ACCESS: CPT | Performed by: STUDENT IN AN ORGANIZED HEALTH CARE EDUCATION/TRAINING PROGRAM

## 2025-04-16 RX ORDER — ASPIRIN 81 MG/1
81 TABLET ORAL DAILY
Qty: 90 TABLET | Refills: 0 | Status: SHIPPED | OUTPATIENT
Start: 2025-04-16

## 2025-04-16 RX ORDER — IOPAMIDOL 755 MG/ML
INJECTION, SOLUTION INTRAVASCULAR PRN
Status: DISCONTINUED | OUTPATIENT
Start: 2025-04-16 | End: 2025-04-16 | Stop reason: HOSPADM

## 2025-04-16 RX ORDER — VERAPAMIL HYDROCHLORIDE 2.5 MG/ML
INJECTION, SOLUTION INTRAVENOUS PRN
Status: DISCONTINUED | OUTPATIENT
Start: 2025-04-16 | End: 2025-04-16 | Stop reason: HOSPADM

## 2025-04-16 RX ORDER — SODIUM CHLORIDE 0.9 % (FLUSH) 0.9 %
5-40 SYRINGE (ML) INJECTION PRN
Status: DISCONTINUED | OUTPATIENT
Start: 2025-04-16 | End: 2025-04-16 | Stop reason: HOSPADM

## 2025-04-16 RX ORDER — SODIUM CHLORIDE 0.9 % (FLUSH) 0.9 %
5-40 SYRINGE (ML) INJECTION EVERY 12 HOURS SCHEDULED
Status: DISCONTINUED | OUTPATIENT
Start: 2025-04-16 | End: 2025-04-16 | Stop reason: HOSPADM

## 2025-04-16 RX ORDER — HEPARIN SODIUM 200 [USP'U]/100ML
INJECTION, SOLUTION INTRAVENOUS PRN
Status: DISCONTINUED | OUTPATIENT
Start: 2025-04-16 | End: 2025-04-16 | Stop reason: HOSPADM

## 2025-04-16 RX ORDER — HEPARIN SODIUM 1000 [USP'U]/ML
INJECTION, SOLUTION INTRAVENOUS; SUBCUTANEOUS PRN
Status: DISCONTINUED | OUTPATIENT
Start: 2025-04-16 | End: 2025-04-16 | Stop reason: HOSPADM

## 2025-04-16 RX ORDER — METOPROLOL SUCCINATE 25 MG/1
25 TABLET, EXTENDED RELEASE ORAL DAILY
Qty: 90 TABLET | Refills: 1 | Status: SHIPPED | OUTPATIENT
Start: 2025-04-16

## 2025-04-16 RX ORDER — LIDOCAINE HYDROCHLORIDE 10 MG/ML
INJECTION, SOLUTION INFILTRATION; PERINEURAL PRN
Status: DISCONTINUED | OUTPATIENT
Start: 2025-04-16 | End: 2025-04-16 | Stop reason: HOSPADM

## 2025-04-16 RX ORDER — ACETAMINOPHEN 325 MG/1
650 TABLET ORAL EVERY 4 HOURS PRN
Status: DISCONTINUED | OUTPATIENT
Start: 2025-04-16 | End: 2025-04-16 | Stop reason: HOSPADM

## 2025-04-16 RX ORDER — SODIUM CHLORIDE 9 MG/ML
INJECTION, SOLUTION INTRAVENOUS PRN
Status: DISCONTINUED | OUTPATIENT
Start: 2025-04-16 | End: 2025-04-16 | Stop reason: HOSPADM

## 2025-04-16 RX ORDER — FENTANYL CITRATE 50 UG/ML
INJECTION, SOLUTION INTRAMUSCULAR; INTRAVENOUS PRN
Status: DISCONTINUED | OUTPATIENT
Start: 2025-04-16 | End: 2025-04-16 | Stop reason: HOSPADM

## 2025-04-16 RX ORDER — MIDAZOLAM HYDROCHLORIDE 1 MG/ML
INJECTION INTRAMUSCULAR; INTRAVENOUS PRN
Status: DISCONTINUED | OUTPATIENT
Start: 2025-04-16 | End: 2025-04-16 | Stop reason: HOSPADM

## 2025-04-16 RX ORDER — LOSARTAN POTASSIUM 25 MG/1
25 TABLET ORAL DAILY
Qty: 90 TABLET | Refills: 1 | Status: SHIPPED | OUTPATIENT
Start: 2025-04-16

## 2025-04-16 NOTE — PROCEDURES
PROCEDURE NOTE  Date: 4/16/2025   Name: Ping Salazar  YOB: 1955    Procedures        BRIEF PROCEDURE NOTE    Date of Procedure: 4/16/2025   Preoperative Diagnosis: cad, hfref  Postoperative Diagnosis: cad    Procedure: Left heart cath, coronary angiography, moderate sedation  Interventional Cardiologist: Joce Muñoz DO  Assistant: None  Anesthesia: local + IV moderate sedation   I administered moderate sedation throughout this procedure. An independent trained observer pushed medications at my direction, and monitored the patient’s level of consciousness and physiological status throughout.  Estimated Blood Loss: Minimal    Access: right radial artery, 6F  Catheters:  Left coronary:JL 3.5, 5f  Right coronary: JR 4, 5f    Findings:   L Main:large caliber, mild to moderate distal disease  LAD:  moderate to large caliber, diffuse moderate proximal disease, severe disease up to 90% within mid-LAD, severe diffuse diseae of distal into apical lad, very small diagonals  LCx: large caliber, av groove bifurcates into two marginal arteries, om1 large caliber with proximal 90% and moderate om2 with severe diffuse disease  RCA:proximal  appears to continue into distal RCA with disease involving proximal pda    LVEDP:  10 mmhg        Specimens Removed: None    Implants: n/a    Closure Device: radial TR band    See full cath note.    Complications: none      Findings:  1. Severe diffuse multi-vessel CAD  2. Normal lvedp    Plan:  Cabg evaluation    [place]  [address], [address]  [place] [address]                                              Office [phone],Fax [phone]

## 2025-04-16 NOTE — INTERVAL H&P NOTE
Update History & Physical  History and physical has been reviewed. The patient has been examined. There have been no significant clinical changes since the completion of the originally dated History and Physical.    Risk and benefit of cardiac catheterization/PCI was described in detail to patient.  (risk of vascular access complication with potential surgical intervention for management, stroke, myocardial infarction, emergent open heart surgery and death).  Patient wishes to proceed with coronary angiography with possible intervention.     Labs   Lab Results   Component Value Date/Time     03/26/2025 04:50 PM    K 3.9 03/26/2025 04:50 PM     03/26/2025 04:50 PM    CO2 26 03/26/2025 04:50 PM    BUN 15 03/26/2025 04:50 PM    CREATININE 0.79 03/26/2025 04:50 PM    GLUCOSE 160 03/26/2025 04:50 PM    CALCIUM 9.4 03/26/2025 04:50 PM    LABGLOM 81 03/26/2025 04:50 PM    LABGLOM >60 03/11/2023 05:55 PM      Lab Results   Component Value Date    WBC 8.7 03/26/2025    HGB 14.3 03/26/2025    HCT 43.3 03/26/2025    MCV 91.9 03/26/2025     03/26/2025         ASA 3  Airway 3      Plan: The risks, benefits, expected outcome, and alternative to the recommended procedure have been discussed with the patient. Patient understands and wants to proceed with the procedure.     Electronically signed by Joce Muñoz DO on 4/16/2025 at 3:27 PM

## 2025-04-16 NOTE — DISCHARGE INSTRUCTIONS
[facility] [place] [facility]    RADIAL CARDIAC CATHETERIZATION AND INTERVENTIONAL DISCHARGE INSTRUCTIONS       MEDICATIONS:  Take only medications ordered by your provider.     ACTIVITIES:  While the wound is healing; bleeding or swelling can occur as a result of stress or strain.  Carefully follow these guidelines:    DO NOT DRIVE for 24 hours after the procedure or as instructed by your provider.  You may shower 24 hours after your procedure. No soaking the wrist for 7 days (i.e., bath tub, swimming, washing dishes).  It is essential that you DO NOT SMOKE.  Do not bend your wrist for 24 hours.  Do not lift more than 3-5 pounds with affected wrist for 1 week.     SITE CARE:  Keep site clean and dry.  Avoid lotions, ointments or powders at the wound site for 1 week.  Check the procedural site for any signs of infection (redness, drainage, swelling).  You may notice a small, hard lump or small bruise at the puncture site. This is normal and will clear in about 2 weeks.  If the lump increases in size; apply firm, direct pressure over the site. Notify your physician.  If there is a small amount of bleeding at the site; apply firm, direct continuous pressure over the site against the puncture site for 10 minutes. Your nurse will review this with you. Notify your physician.  If bleeding does not stop after 10 minutes or if there is a large amount of bleeding, call for an ambulance immediately. Continue to hold pressure until help arrives.     WHEN TO CALL YOUR DOCTOR:  Angina - if your angina symptoms return; use your nitroglycerin as instructed by your provider. If you do not have nitroglycerin or if your symptoms do not completely resolve after 10 -15 minutes, call an ambulance. Do not drive yourself to the hospital.  Warmth, redness, drainage and/or pain at the procedural site or temperature over 101°F.  Persistent tenderness or pain at procedural site.  Swelling, coolness, numbness and /or tingling of the fingers,

## 2025-04-16 NOTE — PROGRESS NOTES
4/16/2025        RE: Ping Salazar         [address] [id number]          To Whom It May Concern,      Due to medical reasons, Ping Salazar   should remain out of work until 4/23/25.  Please excuse her from court 4/17/25 as she should not be making any legal decisions or signing any paperwork until sedation medication has fully worn off after a full 24 hrs.        Sincerely,          Jayla Broderick, RN     Nurse for Dr. Muñoz

## 2025-04-16 NOTE — PROGRESS NOTES
1:33 PM  Patient arrived. ID and allergies verified verbally with patient. Pt voices understanding of procedure to be performed. Consent obtained. Pt prepped for procedure.    5:26 PM  3 ml air released from TR Band. No bleeding or hematoma noted. Radial and Ulnar pulse on r wrist palpable. Pt tolerated well. Will continue to monitor.    1732  3 ml air released from TR Band. No bleeding or hematoma noted. Radial and Ulnar pulse on r wrist palpable. Pt tolerated well. Will continue to monitor.    5:38 PM  3 ml air released from TR Band. No bleeding or hematoma noted. Radial and Ulnar pulse on r wrist palpable. Pt tolerated well. Will continue to monitor.    5:43 PM  3 ml air released from TR Band. No bleeding or hematoma noted. Radial and Ulnar pulse on r wrist palpable. Pt tolerated well. Will continue to monitor.    Air release completed. TR Band removed from r wrist. No bleeding or  Hematoma. Dressing applied. Wrist immobilizer in place. Radial and ulnar pulse remain palpable on affected extremity. Pt tolerated well. Instructions given to pt regarding movement and activity restrictions. Pt voiced understanding.     6:30 PM  Discharge instructions reviewed with patient and son.    Patient ambulates in hallway or on unit.    6:51 PM  Pt discharged via wheelchair with son. Personal belongings with patient upon discharge.     notified in ED via voicemail to contact patient.

## 2025-04-17 NOTE — PROGRESS NOTES
Cardiac Surgery   History and Physical    Subjective:      Ping Salazar is a 70 y.o. female who was referred for cardiac evaluation by Dr Muñoz for CAD. The patient has a medical history of HFimpEF, DM type II, TIA (3/2023), anxiety/depression, neuropathy, prolapsed bladder.     She reports SOB with exertion that improves with rest. Chest tightness occasionally. She also dizziness with standing that has been going on for a long time. She denies orthopnea/PND, claudication.     She reports she is mostly sedentary. Suffering from depression but she is able to walk around on her own.     Pt lives with ex , granddaughter and son. She is on disability. He gets around independently without assist devices. He is a nonsmoker, denies alcohol, and denies illicit drug use. Pt has a significant family history of father, maternal grandmother and maternal grandfather with heart disease.     Cardiac Testing    Cardiac catheterization: 3/26/25    Severe diffuse multi-vessel CAD    Normal lvedp     Findings:   L Main:large caliber, mild to moderate distal disease  LAD:  moderate to large caliber, diffuse moderate proximal disease, severe disease up to 90% within mid-LAD, severe diffuse diseae of distal into apical lad, very small diagonals  LCx: large caliber, av groove bifurcates into two marginal arteries, om1 large caliber with proximal 90% and moderate om2 with severe diffuse disease  RCA:proximal  appears to continue into distal RCA with disease involving proximal pda     LVEDP:  10 mmhg    ECHO: 3/26/25  Interpretation Summary  Show Result Comparison     Left Ventricle: Normal left ventricular systolic function with a visually estimated EF of 55 - 60%. Left ventricle size is normal. Increased wall thickness. Normal wall motion. Abnormal diastolic function.    Aortic Valve: Mildly thickened cusps. Mildly calcified cusps.    Mitral Valve: Mild annular calcification. Mildly thickened leaflets.    Tricuspid Valve:

## 2025-04-17 NOTE — PROGRESS NOTES
Preop instructions    Medications to hold prior to surgery:  Jardiance (3 days) take last dose on ***  Losartan take last dose ***  Metformin take last dose ***    Medications to start prior to surgery:  Mupirocin nares BID starting ***   Peridex oral BID starting ***  Amiodarone PO BID starting ***    Nutrition/Supplement:   Ensure High Protein BID starting ***    NOÉ Herring NP

## 2025-04-22 ENCOUNTER — INITIAL CONSULT (OUTPATIENT)
Age: 70
End: 2025-04-22
Payer: MEDICARE

## 2025-04-22 ENCOUNTER — PREP FOR PROCEDURE (OUTPATIENT)
Age: 70
End: 2025-04-22

## 2025-04-22 VITALS
DIASTOLIC BLOOD PRESSURE: 76 MMHG | HEART RATE: 81 BPM | WEIGHT: 191.5 LBS | TEMPERATURE: 97.4 F | BODY MASS INDEX: 29.99 KG/M2 | SYSTOLIC BLOOD PRESSURE: 120 MMHG | OXYGEN SATURATION: 96 %

## 2025-04-22 DIAGNOSIS — I25.10 DISEASE OF CARDIOVASCULAR SYSTEM: ICD-10-CM

## 2025-04-22 DIAGNOSIS — I25.10 CAD IN NATIVE ARTERY: Primary | ICD-10-CM

## 2025-04-22 PROCEDURE — G8400 PT W/DXA NO RESULTS DOC: HCPCS | Performed by: NURSE PRACTITIONER

## 2025-04-22 PROCEDURE — 99205 OFFICE O/P NEW HI 60 MIN: CPT | Performed by: NURSE PRACTITIONER

## 2025-04-22 PROCEDURE — 1159F MED LIST DOCD IN RCRD: CPT | Performed by: NURSE PRACTITIONER

## 2025-04-22 PROCEDURE — G8419 CALC BMI OUT NRM PARAM NOF/U: HCPCS | Performed by: NURSE PRACTITIONER

## 2025-04-22 PROCEDURE — 1125F AMNT PAIN NOTED PAIN PRSNT: CPT | Performed by: NURSE PRACTITIONER

## 2025-04-22 PROCEDURE — 3017F COLORECTAL CA SCREEN DOC REV: CPT | Performed by: THORACIC SURGERY (CARDIOTHORACIC VASCULAR SURGERY)

## 2025-04-22 PROCEDURE — 1036F TOBACCO NON-USER: CPT | Performed by: THORACIC SURGERY (CARDIOTHORACIC VASCULAR SURGERY)

## 2025-04-22 PROCEDURE — G8427 DOCREV CUR MEDS BY ELIG CLIN: HCPCS | Performed by: NURSE PRACTITIONER

## 2025-04-22 PROCEDURE — 1090F PRES/ABSN URINE INCON ASSESS: CPT | Performed by: NURSE PRACTITIONER

## 2025-04-22 PROCEDURE — 1123F ACP DISCUSS/DSCN MKR DOCD: CPT | Performed by: NURSE PRACTITIONER

## 2025-04-22 RX ORDER — AMIODARONE HYDROCHLORIDE 200 MG/1
400 TABLET ORAL 2 TIMES DAILY
Qty: 20 TABLET | Refills: 0 | Status: SHIPPED | OUTPATIENT
Start: 2025-04-22 | End: 2025-04-27

## 2025-04-22 RX ORDER — SODIUM CHLORIDE 9 MG/ML
INJECTION, SOLUTION INTRAVENOUS PRN
Status: CANCELLED | OUTPATIENT
Start: 2025-04-22

## 2025-04-22 RX ORDER — ACETAMINOPHEN 325 MG/1
1000 TABLET ORAL ONCE
Status: CANCELLED | OUTPATIENT
Start: 2025-04-22 | End: 2025-04-22

## 2025-04-22 RX ORDER — MUPIROCIN 20 MG/G
OINTMENT TOPICAL 2 TIMES DAILY
Qty: 1 EACH | Refills: 0 | Status: SHIPPED | OUTPATIENT
Start: 2025-04-22

## 2025-04-22 RX ORDER — CHLORHEXIDINE GLUCONATE ORAL RINSE 1.2 MG/ML
15 SOLUTION DENTAL 2 TIMES DAILY
Qty: 420 ML | Refills: 0 | Status: SHIPPED | OUTPATIENT
Start: 2025-04-22 | End: 2025-05-06

## 2025-04-22 RX ORDER — CHOLECALCIFEROL (VITAMIN D3) 25 MCG
1 TABLET ORAL DAILY
COMMUNITY
Start: 2025-04-07

## 2025-04-22 RX ORDER — SODIUM CHLORIDE 0.9 % (FLUSH) 0.9 %
5-40 SYRINGE (ML) INJECTION PRN
Status: CANCELLED | OUTPATIENT
Start: 2025-04-22

## 2025-04-22 RX ORDER — SODIUM CHLORIDE 0.9 % (FLUSH) 0.9 %
5-40 SYRINGE (ML) INJECTION EVERY 12 HOURS SCHEDULED
Status: CANCELLED | OUTPATIENT
Start: 2025-04-22

## 2025-04-22 RX ORDER — GABAPENTIN 100 MG/1
300 CAPSULE ORAL ONCE
Status: CANCELLED | OUTPATIENT
Start: 2025-04-22 | End: 2025-04-22

## 2025-04-22 NOTE — PROGRESS NOTES
Preop Instructions     Medications to hold prior to surgery:  Jardiance take last dose 5/4  Losartan take last dose 5/5  Metformin take last dose 5/5    Medications to start prior to surgery:  Mupirocin nares BID starting 5/6   Peridex oral BID starting 5/6  Amiodarone PO BID starting 5/3    Nutrition/Supplement:   Ensure High Protein BID starting 5/3    NOÉ Herring NP

## 2025-04-24 ENCOUNTER — TELEPHONE (OUTPATIENT)
Age: 70
End: 2025-04-24

## 2025-04-24 NOTE — TELEPHONE ENCOUNTER
SPOKE TO PATIENT SON YESTERDAY TO HAVE PATIENT RETURN MY CALL REGARDING ALL HER PREOP TESTING THAT HAS BEEN SCHEDULED.     I LEFT A VOICEMAIL TODAY TO RETURN MY CALL

## 2025-04-30 ENCOUNTER — HOSPITAL ENCOUNTER (OUTPATIENT)
Facility: HOSPITAL | Age: 70
Discharge: HOME | End: 2025-05-03
Payer: MEDICARE

## 2025-04-30 ENCOUNTER — HOSPITAL ENCOUNTER (OUTPATIENT)
Dept: VASCULAR SURGERY | Facility: HOSPITAL | Age: 70
Discharge: HOME | End: 2025-05-02
Payer: MEDICARE

## 2025-04-30 VITALS — OXYGEN SATURATION: 97 % | RESPIRATION RATE: 18 BRPM | HEART RATE: 77 BPM

## 2025-04-30 DIAGNOSIS — I25.10 CAD IN NATIVE ARTERY: ICD-10-CM

## 2025-04-30 LAB
ARTERIAL PATENCY WRIST A: NORMAL
BASE EXCESS BLD CALC-SCNC: 0.6 MMOL/L
BDY SITE: NORMAL
GAS FLOW.O2 O2 DELIVERY SYS: NORMAL
HCO3 BLD-SCNC: 25.2 MMOL/L (ref 21–28)
PCO2 BLD: 39.6 MMHG (ref 35–48)
PH BLD: 7.41 (ref 7.35–7.45)
PO2 BLD: 85 MMHG (ref 83–108)
SAO2 % BLD: 96.5 % (ref 92–97)
SPECIMEN TYPE: NORMAL

## 2025-04-30 PROCEDURE — 82803 BLOOD GASES ANY COMBINATION: CPT

## 2025-04-30 PROCEDURE — 94375 RESPIRATORY FLOW VOLUME LOOP: CPT

## 2025-04-30 PROCEDURE — 36600 WITHDRAWAL OF ARTERIAL BLOOD: CPT

## 2025-04-30 PROCEDURE — 94729 DIFFUSING CAPACITY: CPT

## 2025-04-30 PROCEDURE — 93922 UPR/L XTREMITY ART 2 LEVELS: CPT

## 2025-04-30 PROCEDURE — 93880 EXTRACRANIAL BILAT STUDY: CPT

## 2025-04-30 PROCEDURE — 94726 PLETHYSMOGRAPHY LUNG VOLUMES: CPT

## 2025-04-30 PROCEDURE — 93970 EXTREMITY STUDY: CPT

## 2025-05-01 LAB
VAS LEFT ABI: 1.23
VAS LEFT ANKLE BP: 122 MMHG
VAS LEFT ARM BP: 99 MMHG
VAS LEFT CALF PRESSURE: 143 MMHG
VAS LEFT CCA DIST EDV: 23.8 CM/S
VAS LEFT CCA DIST PSV: 88.5 CM/S
VAS LEFT CCA PROX EDV: 23.8 CM/S
VAS LEFT CCA PROX PSV: 101.5 CM/S
VAS LEFT DORSALIS PEDIS BP: 116 MMHG
VAS LEFT ECA EDV: 10.9 CM/S
VAS LEFT ECA PSV: 73 CM/S
VAS LEFT GSV AT KNEE DIAM: 0.24 MM
VAS LEFT GSV BK DIST DIAM: 0.17 MM
VAS LEFT GSV BK MID DIAM: 0.17 MM
VAS LEFT GSV BK PROX DIAM: 0.15 MM
VAS LEFT GSV JUNC DIAM: 0.87 MM
VAS LEFT GSV THIGH DIST DIAM: 0.19 MM
VAS LEFT GSV THIGH MID DIAM: 0.26 MM
VAS LEFT GSV THIGH PROX DIAM: 0.61 MM
VAS LEFT ICA DIST EDV: 26.4 CM/S
VAS LEFT ICA DIST PSV: 62.6 CM/S
VAS LEFT ICA MID EDV: 26.4 CM/S
VAS LEFT ICA MID PSV: 83.3 CM/S
VAS LEFT ICA PROX EDV: 16 CM/S
VAS LEFT ICA PROX PSV: 65.2 CM/S
VAS LEFT ICA/CCA PSV: 0.9 NO UNITS
VAS LEFT LOW THIGH PRESSURE: 128 MMHG
VAS LEFT PTA BP: 122 MMHG
VAS LEFT SUBCLAVIAN PROX EDV: 0 CM/S
VAS LEFT SUBCLAVIAN PROX PSV: 119.7 CM/S
VAS LEFT TBI: 0.48
VAS LEFT TOE PRESSURE: 48 MMHG
VAS LEFT VERTEBRAL EDV: 15 CM/S
VAS LEFT VERTEBRAL PSV: 39.5 CM/S
VAS RIGHT ABI: 1.17
VAS RIGHT ANKLE BP: 116 MMHG
VAS RIGHT ARM BP: 95 MMHG
VAS RIGHT CALF PRESSURE: 157 MMHG
VAS RIGHT CCA DIST EDV: 24.1 CM/S
VAS RIGHT CCA DIST PSV: 104.4 CM/S
VAS RIGHT CCA PROX EDV: 15 CM/S
VAS RIGHT CCA PROX PSV: 57 CM/S
VAS RIGHT DORSALIS PEDIS BP: 109 MMHG
VAS RIGHT ECA EDV: 43.9 CM/S
VAS RIGHT ECA PSV: 181.2 CM/S
VAS RIGHT GSV AT KNEE DIAM: 0.16 MM
VAS RIGHT GSV BK DIST DIAM: 0.26 MM
VAS RIGHT GSV BK MID DIAM: 0.17 MM
VAS RIGHT GSV BK PROX DIAM: 0.2 MM
VAS RIGHT GSV JUNC DIAM: 0.65 MM
VAS RIGHT GSV THIGH DIST DIAM: 0.12 MM
VAS RIGHT GSV THIGH MID DIAM: 0.24 MM
VAS RIGHT GSV THIGH PROX DIAM: 0.42 MM
VAS RIGHT ICA DIST EDV: 15 CM/S
VAS RIGHT ICA DIST PSV: 47.8 CM/S
VAS RIGHT ICA MID EDV: 18.6 CM/S
VAS RIGHT ICA MID PSV: 55.2 CM/S
VAS RIGHT ICA PROX EDV: 29 CM/S
VAS RIGHT ICA PROX PSV: 83.4 CM/S
VAS RIGHT ICA/CCA PSV: 0.8 NO UNITS
VAS RIGHT LOW THIGH PRESSURE: 126 MMHG
VAS RIGHT PTA BP: 116 MMHG
VAS RIGHT SUBCLAVIAN PROX EDV: 0 CM/S
VAS RIGHT SUBCLAVIAN PROX PSV: 109.4 CM/S
VAS RIGHT TBI: 0.67
VAS RIGHT TOE PRESSURE: 66 MMHG
VAS RIGHT VERTEBRAL EDV: 8.7 CM/S
VAS RIGHT VERTEBRAL PSV: 29.5 CM/S

## 2025-05-01 NOTE — PROCEDURES
[facility]          [address] [place]  [address]                         PULMONARY FUNCTION TEST      PATIENT NAME: TONYA JACOBS             : 1955  MED REC NO: [id number]                       ROOM:   ACCOUNT NO: [id number]                       ADMIT DATE: 2025  PROVIDER: Klever Bui MD    DATE OF SERVICE:  2025    Spirometry reveals normal FEV1 to FVC ratio of 103% predicted.  This is not consistent with an obstructive defect.  FVC is mildly reduced at 2.47 L and 77% predicted.  FEV1 is normal at 2.55 L, 104% predicted.  Lung volumes are within normal limits and there is no evidence of air trapping, hyperinflation or restriction.  Total lung capacity is 100% predicted and residual volume is 102% predicted.  There is no evidence of increased airway pressure.  DLCO was within normal limits at 92% predicted.        KLEVER BUI MD      KMP/AQS  D:  2025 08:35:35  T:  2025 09:16:43  JOB #:  [id number]

## 2025-05-02 NOTE — PROCEDURES
[facility]          [address] [place]  [address]                         PULMONARY FUNCTION TEST      PATIENT NAME: TONYA JACOBS             : 1955  MED REC NO: [id number]                       ROOM:   ACCOUNT NO: [id number]                       ADMIT DATE: 2025  PROVIDER: Klever Bui MD    DATE OF SERVICE:  2025    Spirometry reveals a normal FEV1 to FVC ratio of 103% predicted, and this is not consistent with an obstructive defect.  FVC is mildly reduced at 2.47 L and 77% predicted, but FEV1 is within normal limits with 104% predicted.  Lung volumes are within normal limits without evidence of restriction, hyperinflation, or air trapping.  Total lung capacity is 100% predicted and residual volume is 102% predicted.  DLCO is within normal limits at 92% predicted.        KLEVER BUI MD      KMP/AQS  D:  2025 08:55:28  T:  2025 09:13:34  JOB #:  [id number]

## 2025-05-07 ENCOUNTER — APPOINTMENT (OUTPATIENT)
Facility: HOSPITAL | Age: 70
DRG: 236 | End: 2025-05-07
Attending: THORACIC SURGERY (CARDIOTHORACIC VASCULAR SURGERY)
Payer: MEDICARE

## 2025-05-07 ENCOUNTER — HOSPITAL ENCOUNTER (INPATIENT)
Facility: HOSPITAL | Age: 70
LOS: 7 days | Discharge: HOME | DRG: 236 | End: 2025-05-14
Attending: THORACIC SURGERY (CARDIOTHORACIC VASCULAR SURGERY) | Admitting: THORACIC SURGERY (CARDIOTHORACIC VASCULAR SURGERY)
Payer: MEDICARE

## 2025-05-07 ENCOUNTER — ANESTHESIA EVENT (OUTPATIENT)
Facility: HOSPITAL | Age: 70
End: 2025-05-07
Payer: MEDICARE

## 2025-05-07 DIAGNOSIS — E11.59 TYPE 2 DIABETES MELLITUS WITH OTHER CIRCULATORY COMPLICATION, WITHOUT LONG-TERM CURRENT USE OF INSULIN (HCC): ICD-10-CM

## 2025-05-07 DIAGNOSIS — Z95.1 S/P CABG X 2: ICD-10-CM

## 2025-05-07 DIAGNOSIS — E11.65 TYPE 2 DIABETES MELLITUS WITH HYPERGLYCEMIA (HCC): ICD-10-CM

## 2025-05-07 DIAGNOSIS — I25.10 CAD IN NATIVE ARTERY: Primary | ICD-10-CM

## 2025-05-07 PROBLEM — R73.09 HEMOGLOBIN A1C GREATER THAN 9%, INDICATING POOR DIABETIC CONTROL: Status: ACTIVE | Noted: 2025-05-07

## 2025-05-07 LAB
ALBUMIN SERPL-MCNC: 3.7 G/DL (ref 3.5–5)
ALBUMIN/GLOB SERPL: 1 (ref 1.1–2.2)
ALP SERPL-CCNC: 89 U/L (ref 45–117)
ALT SERPL-CCNC: 21 U/L (ref 12–78)
ANION GAP SERPL CALC-SCNC: 8 MMOL/L (ref 2–12)
APPEARANCE UR: CLEAR
AST SERPL-CCNC: 8 U/L (ref 15–37)
BACTERIA URNS QL MICRO: NEGATIVE /HPF
BASOPHILS # BLD: 0.03 K/UL (ref 0–0.1)
BASOPHILS NFR BLD: 0.4 % (ref 0–1)
BILIRUB SERPL-MCNC: 0.4 MG/DL (ref 0.2–1)
BILIRUB UR QL: NEGATIVE
BUN SERPL-MCNC: 21 MG/DL (ref 6–20)
BUN/CREAT SERPL: 30 (ref 12–20)
CALCIUM SERPL-MCNC: 9.4 MG/DL (ref 8.5–10.1)
CHLORIDE SERPL-SCNC: 104 MMOL/L (ref 97–108)
CO2 SERPL-SCNC: 25 MMOL/L (ref 21–32)
COLOR UR: ABNORMAL
CREAT SERPL-MCNC: 0.69 MG/DL (ref 0.55–1.02)
DIFFERENTIAL METHOD BLD: NORMAL
EKG ATRIAL RATE: 59 BPM
EKG DIAGNOSIS: NORMAL
EKG P AXIS: 56 DEGREES
EKG P-R INTERVAL: 174 MS
EKG Q-T INTERVAL: 436 MS
EKG QRS DURATION: 84 MS
EKG QTC CALCULATION (BAZETT): 431 MS
EKG R AXIS: 14 DEGREES
EKG T AXIS: 29 DEGREES
EKG VENTRICULAR RATE: 59 BPM
EOSINOPHIL # BLD: 0.2 K/UL (ref 0–0.4)
EOSINOPHIL NFR BLD: 2.4 % (ref 0–7)
EPITH CASTS URNS QL MICRO: ABNORMAL /LPF
ERYTHROCYTE [DISTWIDTH] IN BLOOD BY AUTOMATED COUNT: 12.7 % (ref 11.5–14.5)
EST. AVERAGE GLUCOSE BLD GHB EST-MCNC: 209 MG/DL
GLOBULIN SER CALC-MCNC: 3.8 G/DL (ref 2–4)
GLUCOSE BLD STRIP.AUTO-MCNC: 134 MG/DL (ref 65–117)
GLUCOSE BLD STRIP.AUTO-MCNC: 147 MG/DL (ref 65–117)
GLUCOSE BLD STRIP.AUTO-MCNC: 151 MG/DL (ref 65–117)
GLUCOSE BLD STRIP.AUTO-MCNC: 159 MG/DL (ref 65–117)
GLUCOSE BLD STRIP.AUTO-MCNC: 172 MG/DL (ref 65–117)
GLUCOSE BLD STRIP.AUTO-MCNC: 177 MG/DL (ref 65–117)
GLUCOSE BLD STRIP.AUTO-MCNC: 215 MG/DL (ref 65–117)
GLUCOSE BLD STRIP.AUTO-MCNC: 216 MG/DL (ref 65–117)
GLUCOSE BLD STRIP.AUTO-MCNC: 216 MG/DL (ref 65–117)
GLUCOSE BLD STRIP.AUTO-MCNC: 288 MG/DL (ref 65–117)
GLUCOSE BLD STRIP.AUTO-MCNC: 394 MG/DL (ref 65–117)
GLUCOSE BLD STRIP.AUTO-MCNC: 99 MG/DL (ref 65–117)
GLUCOSE SERPL-MCNC: 210 MG/DL (ref 65–100)
GLUCOSE UR STRIP.AUTO-MCNC: >1000 MG/DL
HBA1C MFR BLD: 8.9 % (ref 4–5.6)
HCT VFR BLD AUTO: 39.7 % (ref 35–47)
HGB BLD-MCNC: 13.8 G/DL (ref 11.5–16)
HGB UR QL STRIP: NEGATIVE
HISTORY CHECK: NORMAL
HYALINE CASTS URNS QL MICRO: ABNORMAL /LPF (ref 0–5)
IMM GRANULOCYTES # BLD AUTO: 0.02 K/UL (ref 0–0.04)
IMM GRANULOCYTES NFR BLD AUTO: 0.2 % (ref 0–0.5)
INR PPP: 1 (ref 0.9–1.1)
KETONES UR QL STRIP.AUTO: NEGATIVE MG/DL
LEUKOCYTE ESTERASE UR QL STRIP.AUTO: NEGATIVE
LYMPHOCYTES # BLD: 2.34 K/UL (ref 0.8–3.5)
LYMPHOCYTES NFR BLD: 28.5 % (ref 12–49)
MCH RBC QN AUTO: 31.1 PG (ref 26–34)
MCHC RBC AUTO-ENTMCNC: 34.8 G/DL (ref 30–36.5)
MCV RBC AUTO: 89.4 FL (ref 80–99)
MONOCYTES # BLD: 0.64 K/UL (ref 0–1)
MONOCYTES NFR BLD: 7.8 % (ref 5–13)
NEUTS SEG # BLD: 4.98 K/UL (ref 1.8–8)
NEUTS SEG NFR BLD: 60.7 % (ref 32–75)
NITRITE UR QL STRIP.AUTO: NEGATIVE
NRBC # BLD: 0 K/UL (ref 0–0.01)
NRBC BLD-RTO: 0 PER 100 WBC
PH UR STRIP: 5 (ref 5–8)
PLATELET # BLD AUTO: 233 K/UL (ref 150–400)
PMV BLD AUTO: 9.6 FL (ref 8.9–12.9)
POTASSIUM SERPL-SCNC: 3.8 MMOL/L (ref 3.5–5.1)
PROT SERPL-MCNC: 7.5 G/DL (ref 6.4–8.2)
PROT UR STRIP-MCNC: NEGATIVE MG/DL
PROTHROMBIN TIME: 10.2 SEC (ref 9.2–11.2)
RBC # BLD AUTO: 4.44 M/UL (ref 3.8–5.2)
RBC #/AREA URNS HPF: ABNORMAL /HPF (ref 0–5)
SERVICE CMNT-IMP: ABNORMAL
SERVICE CMNT-IMP: NORMAL
SODIUM SERPL-SCNC: 137 MMOL/L (ref 136–145)
SP GR UR REFRACTOMETRY: >1.03 (ref 1–1.03)
TSH SERPL DL<=0.05 MIU/L-ACNC: 1.17 UIU/ML (ref 0.36–3.74)
URINE CULTURE IF INDICATED: ABNORMAL
UROBILINOGEN UR QL STRIP.AUTO: 0.2 EU/DL (ref 0.2–1)
WBC # BLD AUTO: 8.2 K/UL (ref 3.6–11)
WBC URNS QL MICRO: ABNORMAL /HPF (ref 0–4)

## 2025-05-07 PROCEDURE — 86900 BLOOD TYPING SEROLOGIC ABO: CPT

## 2025-05-07 PROCEDURE — 85025 COMPLETE CBC W/AUTO DIFF WBC: CPT

## 2025-05-07 PROCEDURE — 71046 X-RAY EXAM CHEST 2 VIEWS: CPT

## 2025-05-07 PROCEDURE — 99222 1ST HOSP IP/OBS MODERATE 55: CPT | Performed by: CLINICAL NURSE SPECIALIST

## 2025-05-07 PROCEDURE — 86901 BLOOD TYPING SEROLOGIC RH(D): CPT

## 2025-05-07 PROCEDURE — 86850 RBC ANTIBODY SCREEN: CPT

## 2025-05-07 PROCEDURE — 99284 EMERGENCY DEPT VISIT MOD MDM: CPT

## 2025-05-07 PROCEDURE — 2580000003 HC RX 258: Performed by: CLINICAL NURSE SPECIALIST

## 2025-05-07 PROCEDURE — 6370000000 HC RX 637 (ALT 250 FOR IP): Performed by: CLINICAL NURSE SPECIALIST

## 2025-05-07 PROCEDURE — 85610 PROTHROMBIN TIME: CPT

## 2025-05-07 PROCEDURE — 83036 HEMOGLOBIN GLYCOSYLATED A1C: CPT

## 2025-05-07 PROCEDURE — 82962 GLUCOSE BLOOD TEST: CPT

## 2025-05-07 PROCEDURE — 2500000003 HC RX 250 WO HCPCS: Performed by: NURSE PRACTITIONER

## 2025-05-07 PROCEDURE — 2060000000 HC ICU INTERMEDIATE R&B

## 2025-05-07 PROCEDURE — 81001 URINALYSIS AUTO W/SCOPE: CPT

## 2025-05-07 PROCEDURE — 97530 THERAPEUTIC ACTIVITIES: CPT

## 2025-05-07 PROCEDURE — 80053 COMPREHEN METABOLIC PANEL: CPT

## 2025-05-07 PROCEDURE — 86923 COMPATIBILITY TEST ELECTRIC: CPT

## 2025-05-07 PROCEDURE — 84443 ASSAY THYROID STIM HORMONE: CPT

## 2025-05-07 PROCEDURE — 97161 PT EVAL LOW COMPLEX 20 MIN: CPT

## 2025-05-07 PROCEDURE — 2580000003 HC RX 258: Performed by: NURSE PRACTITIONER

## 2025-05-07 PROCEDURE — 93005 ELECTROCARDIOGRAM TRACING: CPT | Performed by: NURSE PRACTITIONER

## 2025-05-07 PROCEDURE — 6370000000 HC RX 637 (ALT 250 FOR IP): Performed by: NURSE PRACTITIONER

## 2025-05-07 RX ORDER — AMIODARONE HYDROCHLORIDE 200 MG/1
400 TABLET ORAL 2 TIMES DAILY
Status: DISCONTINUED | OUTPATIENT
Start: 2025-05-07 | End: 2025-05-08

## 2025-05-07 RX ORDER — GABAPENTIN 300 MG/1
300 CAPSULE ORAL ONCE
Status: DISCONTINUED | OUTPATIENT
Start: 2025-05-07 | End: 2025-05-08 | Stop reason: HOSPADM

## 2025-05-07 RX ORDER — SODIUM CHLORIDE 0.9 % (FLUSH) 0.9 %
5-40 SYRINGE (ML) INJECTION EVERY 12 HOURS SCHEDULED
Status: DISCONTINUED | OUTPATIENT
Start: 2025-05-07 | End: 2025-05-08 | Stop reason: HOSPADM

## 2025-05-07 RX ORDER — INSULIN LISPRO 100 [IU]/ML
1-20 INJECTION, SOLUTION INTRAVENOUS; SUBCUTANEOUS
Status: COMPLETED | OUTPATIENT
Start: 2025-05-07 | End: 2025-05-07

## 2025-05-07 RX ORDER — CHLORHEXIDINE GLUCONATE ORAL RINSE 1.2 MG/ML
15 SOLUTION DENTAL 2 TIMES DAILY
Status: DISCONTINUED | OUTPATIENT
Start: 2025-05-07 | End: 2025-05-08

## 2025-05-07 RX ORDER — METOPROLOL SUCCINATE 25 MG/1
25 TABLET, EXTENDED RELEASE ORAL DAILY
Status: DISCONTINUED | OUTPATIENT
Start: 2025-05-07 | End: 2025-05-08

## 2025-05-07 RX ORDER — SODIUM CHLORIDE 9 MG/ML
INJECTION, SOLUTION INTRAVENOUS PRN
Status: DISCONTINUED | OUTPATIENT
Start: 2025-05-07 | End: 2025-05-08 | Stop reason: HOSPADM

## 2025-05-07 RX ORDER — DEXTROSE MONOHYDRATE 100 MG/ML
INJECTION, SOLUTION INTRAVENOUS CONTINUOUS PRN
Status: DISCONTINUED | OUTPATIENT
Start: 2025-05-07 | End: 2025-05-08

## 2025-05-07 RX ORDER — ATORVASTATIN CALCIUM 40 MG/1
80 TABLET, FILM COATED ORAL NIGHTLY
Status: DISCONTINUED | OUTPATIENT
Start: 2025-05-07 | End: 2025-05-08

## 2025-05-07 RX ORDER — SODIUM CHLORIDE 9 MG/ML
INJECTION, SOLUTION INTRAVENOUS PRN
Status: DISCONTINUED | OUTPATIENT
Start: 2025-05-07 | End: 2025-05-08

## 2025-05-07 RX ORDER — ASPIRIN 81 MG/1
81 TABLET, CHEWABLE ORAL DAILY
Status: DISCONTINUED | OUTPATIENT
Start: 2025-05-07 | End: 2025-05-08

## 2025-05-07 RX ORDER — SODIUM CHLORIDE 0.9 % (FLUSH) 0.9 %
5-40 SYRINGE (ML) INJECTION PRN
Status: DISCONTINUED | OUTPATIENT
Start: 2025-05-07 | End: 2025-05-08 | Stop reason: HOSPADM

## 2025-05-07 RX ORDER — PAROXETINE 20 MG/1
60 TABLET, FILM COATED ORAL DAILY
Status: DISCONTINUED | OUTPATIENT
Start: 2025-05-08 | End: 2025-05-14 | Stop reason: HOSPADM

## 2025-05-07 RX ORDER — ACETAMINOPHEN 500 MG
1000 TABLET ORAL ONCE
Status: DISCONTINUED | OUTPATIENT
Start: 2025-05-07 | End: 2025-05-08 | Stop reason: SDUPTHER

## 2025-05-07 RX ORDER — MUPIROCIN 20 MG/G
OINTMENT TOPICAL 2 TIMES DAILY
Status: DISCONTINUED | OUTPATIENT
Start: 2025-05-07 | End: 2025-05-08

## 2025-05-07 RX ADMIN — SODIUM CHLORIDE, PRESERVATIVE FREE 10 ML: 5 INJECTION INTRAVENOUS at 20:22

## 2025-05-07 RX ADMIN — MUPIROCIN: 20 OINTMENT TOPICAL at 20:20

## 2025-05-07 RX ADMIN — INSULIN LISPRO 2 UNITS: 100 INJECTION, SOLUTION INTRAVENOUS; SUBCUTANEOUS at 14:24

## 2025-05-07 RX ADMIN — SODIUM CHLORIDE, PRESERVATIVE FREE 10 ML: 5 INJECTION INTRAVENOUS at 11:55

## 2025-05-07 RX ADMIN — INSULIN LISPRO 2 UNITS: 100 INJECTION, SOLUTION INTRAVENOUS; SUBCUTANEOUS at 18:04

## 2025-05-07 RX ADMIN — ATORVASTATIN CALCIUM 80 MG: 40 TABLET, FILM COATED ORAL at 20:21

## 2025-05-07 RX ADMIN — CHLORHEXIDINE GLUCONATE 15 ML: 1.2 RINSE ORAL at 12:16

## 2025-05-07 RX ADMIN — METOPROLOL SUCCINATE 25 MG: 25 TABLET, EXTENDED RELEASE ORAL at 12:26

## 2025-05-07 RX ADMIN — SODIUM CHLORIDE 6.7 UNITS/HR: 9 INJECTION, SOLUTION INTRAVENOUS at 12:04

## 2025-05-07 RX ADMIN — CHLORHEXIDINE GLUCONATE 15 ML: 1.2 RINSE ORAL at 20:20

## 2025-05-07 RX ADMIN — AMIODARONE HYDROCHLORIDE 400 MG: 200 TABLET ORAL at 20:21

## 2025-05-07 RX ADMIN — SODIUM CHLORIDE: 0.9 INJECTION, SOLUTION INTRAVENOUS at 12:04

## 2025-05-07 RX ADMIN — MUPIROCIN: 20 OINTMENT TOPICAL at 12:16

## 2025-05-07 RX ADMIN — AMIODARONE HYDROCHLORIDE 400 MG: 200 TABLET ORAL at 12:26

## 2025-05-07 RX ADMIN — MICONAZOLE NITRATE: 20 POWDER TOPICAL at 20:23

## 2025-05-07 RX ADMIN — ASPIRIN 81 MG CHEWABLE TABLET 81 MG: 81 TABLET CHEWABLE at 12:26
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
## 2025-05-07 NOTE — CONSULTS
[facility]  [facility]  DIABETES MANAGEMENT CONSULT    Evaluation and Action Plan   Ping Salazar is a 70 y.o. with uncontrolled T2D with neuropathy, HFimpEF, and TIA (2023).  Admitted to [facility] 5/7 prior to CABG on 5/8 to initiate insulin infusion for A1C 9%.  S/p cardiac cath 4/16/25 as outpatient which showed severe diffuse multi-vessel CAD with normal lvedp.  Diabetes mgmt consulted by cardiac surgery team for advanced nursing diabetes evaluation, inpatient BG mgmt while patient is receiving insulin infusion both pre and post op.      Patient reports living with diabetes since in her 40's.  Verified she is prescribed Jardiance 10mg and Metformin 1000 mg BID by her cardiologist.  She is not established with PCP.  She endorses she was \"just started\" on the Jardiance recently.  She admits to not taking her Metformin like she should and on average usually takes one pill per day and does miss 1 full day of metformin in a week's time.  She was not monitoring her BG PTA.  She was initially reluctant to disclose her diet practices but her son (at bedside) encouraged her to report her diet intake.  She usually eats a [employer] breakfast of biscuit and gravy with hash brown , does admit to eating out a lot and occasionally enjoys milkshakes or ice cream sandwiches. Counseled patient re: diet adjustments and modifying her portions of starches and sweets.  She was receptive to the initial education provided.  Also discussed her elevated A1C and likelihood of medication adjustments at discharge.     Blood glucose pattern  No data on file yet.  Significant diabetes-related events over the past 24-72 hours  A1C 9.0% in March 2025  Insulin infusion pre op on 5/7  Fasting BG:  Pre-prandial: 394 at start of insulin infusion  Basal:   Bolus:   Correction:   Total daily insulin dose in the last 24 hours:      Management Rationale Action Plan   Medication  Insulin infusion with CTS parameters today  Will place meal
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
## 2025-05-07 NOTE — CONSENT
Informed Consent for Blood Component Transfusion Note    I have discussed with the patient the rationale for blood component transfusion; its benefits in treating or preventing fatigue, organ damage, or death; and its risk which includes mild transfusion reactions, rare risk of blood borne infection, or more serious but rare reactions. I have discussed the alternatives to transfusion, including the risk and consequences of not receiving transfusion. The patient had an opportunity to ask questions and had agreed to proceed with transfusion of blood components.    Electronically signed by NOÉ Danielle NP on 5/7/25 at 2:31 PM EDT
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
## 2025-05-07 NOTE — PROGRESS NOTES
PHYSICAL THERAPY EVALUATION/DISCHARGE    Patient: Ping Salazar (70 y.o. female)  Date: 5/7/2025  Primary Diagnosis: CAD (coronary artery disease) [I25.10]  CAD in native artery [I25.10]       Precautions:              ASSESSMENT AND RECOMMENDATIONS:  Based on the objective data below, the patient is at her functional baseline s/p admission for CAD with plan for CABG on 5/8. Prior to admission patient was independent with mobility, however she endorses some increasing unsteadiness over the last 2 years. Today, she was educated on \"move in the tube\" precautions in prep for CABG and she expressed understanding. She required cueing during transfers & bed mobility to adhere to precautions, but overall completed mobility at a supervision/Mod I level including ambulation in hallway. She demonstrated occasional path deviations during ambulation but no overt loss of balance. Patient endorses that this is typical for her. Given current functional status, acute PT will sign off at this time with plans for re-evaluation s/p CABG.     Functional Outcome Measure:  The patient scored 24 on the [facility] outcome measure which is indicative of <=171,2,3 had higher odds of discharging home with home health or need of SNF/IPR.      Further skilled acute physical therapy is not indicated at this time.       PLAN :  Recommendation for discharge: (in order for the patient to meet his/her long term goals):   Pending post-op functional status - currently no PT needs    Other factors to consider for discharge:  CABG planned for 5/8    IF patient discharges home will need the following DME: continuing to assess with progress       SUBJECTIVE:   Patient stated “I have been more unsteady in the last 2 years.”    OBJECTIVE DATA SUMMARY:     Past Medical History:   Diagnosis Date    Anxiety     Cerebral artery occlusion with cerebral infarction (HCC)     Depression     Diabetes (HCC)     Falls     Fatigue     Neuropathy     Psychiatric disorder

## 2025-05-07 NOTE — PROGRESS NOTES
4 Eyes Skin Assessment     NAME:  Ping Salazar  YOB: 1955  MEDICAL RECORD NUMBER:  [id number]    The patient is being assessed for  Admission    I agree that at least one RN has performed a thorough Head to Toe Skin Assessment on the patient. ALL assessment sites listed below have been assessed.      Areas assessed by both nurses:    Head, Face, Ears, Shoulders, Back, Chest, Arms, Elbows, Hands, Sacrum. Buttock, Coccyx, Ischium, and Legs. Feet and Heels        Does the Patient have a Wound? No noted wound(s)    Moisture associated breakdown under pannus and sacrum/gluteal cleft  R knee abrasion    Estevan Prevention initiated by RN: Yes  Wound Care Orders initiated by RN: No    Pressure Injury (Stage 3,4, Unstageable, DTI, NWPT, and Complex wounds) if present, place Wound referral order by RN under : No    New Ostomies, if present place, Ostomy referral order under : No     Nurse 1 eSignature: Electronically signed by CYNTHIA KEITA RN on 5/7/25 at 6:36 PM EDT    **SHARE this note so that the co-signing nurse can place an eSignature**    Nurse 2 eSignature: Electronically signed by Magdalena Wellington RN on 5/7/25 at 6:37 PM EDT

## 2025-05-07 NOTE — PROGRESS NOTES
Cardiac Surgery Care Coordinator- Met with Ping Salazar and her son, Introduced role of the Cardiac Surgery Care Coordinator. Reviewed plan of care and reviewed pre-op education.  Discussed day of surgery expectations for the pt and family. Reinforced sternal precautions and keeping your move in the tube. Encouraged Ping Salazar and her family to verbalize and offered emotional support.

## 2025-05-08 ENCOUNTER — HOSPITAL ENCOUNTER (OUTPATIENT)
Facility: HOSPITAL | Age: 70
Discharge: HOME | End: 2025-05-10
Attending: THORACIC SURGERY (CARDIOTHORACIC VASCULAR SURGERY)

## 2025-05-08 ENCOUNTER — ANESTHESIA (OUTPATIENT)
Facility: HOSPITAL | Age: 70
End: 2025-05-08
Payer: MEDICARE

## 2025-05-08 ENCOUNTER — APPOINTMENT (OUTPATIENT)
Facility: HOSPITAL | Age: 70
DRG: 236 | End: 2025-05-08
Attending: THORACIC SURGERY (CARDIOTHORACIC VASCULAR SURGERY)
Payer: MEDICARE

## 2025-05-08 PROBLEM — Z95.1 S/P CABG X 2: Status: ACTIVE | Noted: 2025-05-08

## 2025-05-08 LAB
ABO + RH BLD: NORMAL
ACUTE KIDNEY INJURY RISK NEPHROCHECK: 0.34 (ref 0–0.3)
ALBUMIN SERPL-MCNC: 3.6 G/DL (ref 3.5–5)
ALBUMIN SERPL-MCNC: 4 G/DL (ref 3.5–5)
ALBUMIN/GLOB SERPL: 1.4 (ref 1.1–2.2)
ALBUMIN/GLOB SERPL: 1.7 (ref 1.1–2.2)
ALP SERPL-CCNC: 39 U/L (ref 45–117)
ALP SERPL-CCNC: 45 U/L (ref 45–117)
ALT SERPL-CCNC: 16 U/L (ref 12–78)
ALT SERPL-CCNC: 17 U/L (ref 12–78)
ANION GAP BLD CALC-SCNC: 10 (ref 10–20)
ANION GAP BLD CALC-SCNC: 11 (ref 10–20)
ANION GAP BLD CALC-SCNC: 12 (ref 10–20)
ANION GAP BLD CALC-SCNC: ABNORMAL (ref 10–20)
ANION GAP SERPL CALC-SCNC: 3 MMOL/L (ref 2–12)
ANION GAP SERPL CALC-SCNC: 4 MMOL/L (ref 2–12)
APTT PPP: 26.2 SEC (ref 22.1–31)
AST SERPL-CCNC: 16 U/L (ref 15–37)
AST SERPL-CCNC: 21 U/L (ref 15–37)
BASE DEFICIT BLD-SCNC: 0.5 MMOL/L
BASE DEFICIT BLD-SCNC: 1 MMOL/L
BASE DEFICIT BLD-SCNC: 1.7 MMOL/L
BASE DEFICIT BLD-SCNC: 2.5 MMOL/L
BASE DEFICIT BLD-SCNC: 2.6 MMOL/L
BASE DEFICIT BLD-SCNC: 3.1 MMOL/L
BASE DEFICIT BLD-SCNC: 3.5 MMOL/L
BASE DEFICIT BLD-SCNC: 3.9 MMOL/L
BDY SITE: ABNORMAL
BILIRUB SERPL-MCNC: 0.5 MG/DL (ref 0.2–1)
BILIRUB SERPL-MCNC: 0.6 MG/DL (ref 0.2–1)
BLD PROD TYP BPU: NORMAL
BLD PROD TYP BPU: NORMAL
BLOOD BANK DISPENSE STATUS: NORMAL
BLOOD BANK DISPENSE STATUS: NORMAL
BLOOD GROUP ANTIBODIES SERPL: NORMAL
BPU ID: NORMAL
BPU ID: NORMAL
BUN SERPL-MCNC: 21 MG/DL (ref 6–20)
BUN SERPL-MCNC: 21 MG/DL (ref 6–20)
BUN/CREAT SERPL: 29 (ref 12–20)
BUN/CREAT SERPL: 34 (ref 12–20)
CA-I BLD-MCNC: 1.17 MMOL/L (ref 1.15–1.33)
CA-I BLD-MCNC: 1.18 MMOL/L (ref 1.15–1.33)
CA-I BLD-MCNC: 1.19 MMOL/L (ref 1.15–1.33)
CA-I BLD-MCNC: 1.19 MMOL/L (ref 1.15–1.33)
CA-I BLD-MCNC: 1.21 MMOL/L (ref 1.15–1.33)
CA-I BLD-MCNC: 1.31 MMOL/L (ref 1.15–1.33)
CA-I BLD-SCNC: 1.23 MMOL/L (ref 1.12–1.32)
CA-I BLD-SCNC: 1.27 MMOL/L (ref 1.12–1.32)
CALCIUM SERPL-MCNC: 8.5 MG/DL (ref 8.5–10.1)
CALCIUM SERPL-MCNC: 8.6 MG/DL (ref 8.5–10.1)
CHLORIDE BLD-SCNC: 104 MMOL/L (ref 100–111)
CHLORIDE BLD-SCNC: 108 MMOL/L (ref 100–111)
CHLORIDE BLD-SCNC: 108 MMOL/L (ref 100–111)
CHLORIDE BLD-SCNC: 109 MMOL/L (ref 100–111)
CHLORIDE SERPL-SCNC: 110 MMOL/L (ref 97–108)
CHLORIDE SERPL-SCNC: 111 MMOL/L (ref 97–108)
CO2 BLD-SCNC: 22 MMOL/L (ref 22–29)
CO2 BLD-SCNC: 23 MMOL/L (ref 22–29)
CO2 BLD-SCNC: 23 MMOL/L (ref 22–29)
CO2 BLD-SCNC: 25 MMOL/L (ref 22–29)
CO2 SERPL-SCNC: 24 MMOL/L (ref 21–32)
CO2 SERPL-SCNC: 26 MMOL/L (ref 21–32)
CREAT SERPL-MCNC: 0.62 MG/DL (ref 0.55–1.02)
CREAT SERPL-MCNC: 0.73 MG/DL (ref 0.55–1.02)
CREAT UR-MCNC: 0.5 MG/DL (ref 0.6–1.3)
CREAT UR-MCNC: 0.6 MG/DL (ref 0.6–1.3)
CREAT UR-MCNC: 0.7 MG/DL (ref 0.6–1.3)
CREAT UR-MCNC: 0.7 MG/DL (ref 0.6–1.3)
CROSSMATCH RESULT: NORMAL
CROSSMATCH RESULT: NORMAL
ECHO BSA: 2.04 M2
EKG ATRIAL RATE: 74 BPM
EKG DIAGNOSIS: NORMAL
EKG P AXIS: 65 DEGREES
EKG P-R INTERVAL: 194 MS
EKG Q-T INTERVAL: 452 MS
EKG QRS DURATION: 88 MS
EKG QTC CALCULATION (BAZETT): 501 MS
EKG R AXIS: 63 DEGREES
EKG T AXIS: 44 DEGREES
EKG VENTRICULAR RATE: 74 BPM
ERYTHROCYTE [DISTWIDTH] IN BLOOD BY AUTOMATED COUNT: 12.7 % (ref 11.5–14.5)
ERYTHROCYTE [DISTWIDTH] IN BLOOD BY AUTOMATED COUNT: 12.9 % (ref 11.5–14.5)
GLOBULIN SER CALC-MCNC: 2.3 G/DL (ref 2–4)
GLOBULIN SER CALC-MCNC: 2.5 G/DL (ref 2–4)
GLUCOSE BLD STRIP.AUTO-MCNC: 100 MG/DL (ref 65–117)
GLUCOSE BLD STRIP.AUTO-MCNC: 101 MG/DL (ref 65–117)
GLUCOSE BLD STRIP.AUTO-MCNC: 101 MG/DL (ref 65–117)
GLUCOSE BLD STRIP.AUTO-MCNC: 104 MG/DL (ref 65–117)
GLUCOSE BLD STRIP.AUTO-MCNC: 105 MG/DL (ref 65–117)
GLUCOSE BLD STRIP.AUTO-MCNC: 109 MG/DL (ref 65–117)
GLUCOSE BLD STRIP.AUTO-MCNC: 114 MG/DL (ref 65–117)
GLUCOSE BLD STRIP.AUTO-MCNC: 125 MG/DL (ref 65–117)
GLUCOSE BLD STRIP.AUTO-MCNC: 129 MG/DL (ref 65–117)
GLUCOSE BLD STRIP.AUTO-MCNC: 166 MG/DL (ref 65–117)
GLUCOSE BLD STRIP.AUTO-MCNC: 171 MG/DL (ref 65–117)
GLUCOSE BLD STRIP.AUTO-MCNC: 178 MG/DL (ref 65–117)
GLUCOSE BLD STRIP.AUTO-MCNC: 180 MG/DL (ref 74–99)
GLUCOSE BLD STRIP.AUTO-MCNC: 189 MG/DL (ref 74–99)
GLUCOSE BLD STRIP.AUTO-MCNC: 200 MG/DL (ref 74–99)
GLUCOSE BLD STRIP.AUTO-MCNC: 202 MG/DL (ref 65–117)
GLUCOSE BLD STRIP.AUTO-MCNC: 214 MG/DL (ref 74–99)
GLUCOSE BLD STRIP.AUTO-MCNC: 83 MG/DL (ref 65–117)
GLUCOSE BLD STRIP.AUTO-MCNC: 91 MG/DL (ref 65–117)
GLUCOSE BLD STRIP.AUTO-MCNC: 96 MG/DL (ref 65–117)
GLUCOSE BLD STRIP.AUTO-MCNC: 97 MG/DL (ref 65–117)
GLUCOSE BLD STRIP.AUTO-MCNC: 97 MG/DL (ref 74–99)
GLUCOSE SERPL-MCNC: 123 MG/DL (ref 65–100)
GLUCOSE SERPL-MCNC: 199 MG/DL (ref 65–100)
HCO3 BLD-SCNC: 24.6 MMOL/L (ref 21–28)
HCO3 BLD-SCNC: 26.4 MMOL/L (ref 21–28)
HCO3 BLDA-SCNC: 23 MMOL/L
HCO3 BLDA-SCNC: 24 MMOL/L
HCO3 BLDA-SCNC: 26 MMOL/L
HCT VFR BLD AUTO: 31.4 % (ref 35–47)
HCT VFR BLD AUTO: 33.9 % (ref 35–47)
HGB BLD-MCNC: 10 G/DL (ref 11.5–16)
HGB BLD-MCNC: 10.8 G/DL (ref 11.5–16)
HGB BLD-MCNC: 11.2 G/DL (ref 11.5–16)
HGB BLD-MCNC: 11.4 G/DL (ref 11.5–16)
HGB BLD-MCNC: 12 G/DL (ref 11.5–16)
HGB BLD-MCNC: 12.5 G/DL (ref 11.5–16)
HGB BLD-MCNC: 9.3 G/DL (ref 11.5–16)
HGB BLD-MCNC: 9.5 G/DL (ref 11.5–16)
INR PPP: 1.1 (ref 0.9–1.1)
LACTATE BLD-SCNC: 0.41 MMOL/L (ref 0.4–2)
LACTATE BLD-SCNC: 0.67 MMOL/L (ref 0.4–2)
LACTATE BLD-SCNC: 0.98 MMOL/L (ref 0.4–2)
LACTATE BLD-SCNC: 1.3 MMOL/L (ref 0.4–2)
LACTATE BLD-SCNC: 1.35 MMOL/L (ref 0.4–2)
LACTATE BLD-SCNC: <0.4 MMOL/L (ref 0.4–2)
MAGNESIUM SERPL-MCNC: 2.5 MG/DL (ref 1.6–2.4)
MCH RBC QN AUTO: 31.1 PG (ref 26–34)
MCH RBC QN AUTO: 31.2 PG (ref 26–34)
MCHC RBC AUTO-ENTMCNC: 33.6 G/DL (ref 30–36.5)
MCHC RBC AUTO-ENTMCNC: 34.4 G/DL (ref 30–36.5)
MCV RBC AUTO: 90.8 FL (ref 80–99)
MCV RBC AUTO: 92.6 FL (ref 80–99)
NRBC # BLD: 0 K/UL (ref 0–0.01)
NRBC # BLD: 0 K/UL (ref 0–0.01)
NRBC BLD-RTO: 0 PER 100 WBC
NRBC BLD-RTO: 0 PER 100 WBC
PCO2 BLD: 39.9 MMHG (ref 35–48)
PCO2 BLD: 45.5 MMHG (ref 35–48)
PCO2 BLD: 45.6 MMHG (ref 35–48)
PCO2 BLD: 47.6 MMHG (ref 35–48)
PCO2 BLD: 48.5 MMHG (ref 35–48)
PCO2 BLD: 49 MMHG (ref 35–48)
PCO2 BLD: 53.2 MMHG (ref 35–48)
PCO2 BLD: 55.4 MMHG (ref 35–48)
PH BLD: 7.29 (ref 7.35–7.45)
PH BLD: 7.29 (ref 7.35–7.45)
PH BLD: 7.3 (ref 7.35–7.45)
PH BLD: 7.32 (ref 7.35–7.45)
PH BLD: 7.32 (ref 7.35–7.45)
PH BLD: 7.36 (ref 7.35–7.45)
PLATELET # BLD AUTO: 194 K/UL (ref 150–400)
PLATELET # BLD AUTO: 202 K/UL (ref 150–400)
PMV BLD AUTO: 9.4 FL (ref 8.9–12.9)
PMV BLD AUTO: 9.5 FL (ref 8.9–12.9)
PO2 BLD: 118 MMHG (ref 83–108)
PO2 BLD: 172 MMHG (ref 83–108)
PO2 BLD: 264 MMHG (ref 83–108)
PO2 BLD: 268 MMHG (ref 83–108)
PO2 BLD: 295 MMHG (ref 83–108)
PO2 BLD: 308 MMHG (ref 83–108)
PO2 BLD: 352 MMHG (ref 83–108)
PO2 BLD: 99 MMHG (ref 83–108)
POTASSIUM BLD-SCNC: 3.8 MMOL/L (ref 3.5–5.5)
POTASSIUM BLD-SCNC: 3.8 MMOL/L (ref 3.5–5.5)
POTASSIUM BLD-SCNC: 3.9 MMOL/L (ref 3.5–5.5)
POTASSIUM BLD-SCNC: 4.2 MMOL/L (ref 3.5–5.5)
POTASSIUM BLD-SCNC: 4.5 MMOL/L (ref 3.5–5.5)
POTASSIUM BLD-SCNC: 4.6 MMOL/L (ref 3.5–5.5)
POTASSIUM SERPL-SCNC: 3.6 MMOL/L (ref 3.5–5.1)
POTASSIUM SERPL-SCNC: 4.1 MMOL/L (ref 3.5–5.1)
PROT SERPL-MCNC: 6.1 G/DL (ref 6.4–8.2)
PROT SERPL-MCNC: 6.3 G/DL (ref 6.4–8.2)
PROTHROMBIN TIME: 11.6 SEC (ref 9.2–11.2)
RBC # BLD AUTO: 3.46 M/UL (ref 3.8–5.2)
RBC # BLD AUTO: 3.66 M/UL (ref 3.8–5.2)
SAO2 % BLD: 100 % (ref 94–98)
SAO2 % BLD: 97 % (ref 94–98)
SAO2 % BLD: 98.2 % (ref 92–97)
SAO2 % BLD: 99.3 % (ref 92–97)
SERVICE CMNT-IMP: ABNORMAL
SERVICE CMNT-IMP: NORMAL
SODIUM BLD-SCNC: 124 MMOL/L (ref 136–145)
SODIUM BLD-SCNC: 141 MMOL/L (ref 136–145)
SODIUM BLD-SCNC: 142 MMOL/L (ref 136–145)
SODIUM BLD-SCNC: 142 MMOL/L (ref 136–145)
SODIUM SERPL-SCNC: 139 MMOL/L (ref 136–145)
SODIUM SERPL-SCNC: 139 MMOL/L (ref 136–145)
SPECIMEN EXP DATE BLD: NORMAL
SPECIMEN SITE: ABNORMAL
SPECIMEN TYPE: ABNORMAL
SPECIMEN TYPE: ABNORMAL
THERAPEUTIC RANGE: NORMAL SECS (ref 58–77)
UNIT DIVISION: 0
UNIT DIVISION: 0
WBC # BLD AUTO: 15 K/UL (ref 3.6–11)
WBC # BLD AUTO: 16.3 K/UL (ref 3.6–11)

## 2025-05-08 PROCEDURE — 2000000000 HC ICU R&B

## 2025-05-08 PROCEDURE — 6370000000 HC RX 637 (ALT 250 FOR IP): Performed by: CLINICAL NURSE SPECIALIST

## 2025-05-08 PROCEDURE — P9045 ALBUMIN (HUMAN), 5%, 250 ML: HCPCS | Performed by: PHYSICIAN ASSISTANT

## 2025-05-08 PROCEDURE — 84132 ASSAY OF SERUM POTASSIUM: CPT

## 2025-05-08 PROCEDURE — 2500000003 HC RX 250 WO HCPCS: Performed by: NURSE PRACTITIONER

## 2025-05-08 PROCEDURE — 85730 THROMBOPLASTIN TIME PARTIAL: CPT

## 2025-05-08 PROCEDURE — 5A1221Z PERFORMANCE OF CARDIAC OUTPUT, CONTINUOUS: ICD-10-PCS | Performed by: THORACIC SURGERY (CARDIOTHORACIC VASCULAR SURGERY)

## 2025-05-08 PROCEDURE — 021009W BYPASS CORONARY ARTERY, ONE ARTERY FROM AORTA WITH AUTOLOGOUS VENOUS TISSUE, OPEN APPROACH: ICD-10-PCS | Performed by: THORACIC SURGERY (CARDIOTHORACIC VASCULAR SURGERY)

## 2025-05-08 PROCEDURE — 6360000002 HC RX W HCPCS: Performed by: PHYSICIAN ASSISTANT

## 2025-05-08 PROCEDURE — 80053 COMPREHEN METABOLIC PANEL: CPT

## 2025-05-08 PROCEDURE — 94002 VENT MGMT INPAT INIT DAY: CPT

## 2025-05-08 PROCEDURE — 6370000000 HC RX 637 (ALT 250 FOR IP): Performed by: NURSE ANESTHETIST, CERTIFIED REGISTERED

## 2025-05-08 PROCEDURE — 82947 ASSAY GLUCOSE BLOOD QUANT: CPT

## 2025-05-08 PROCEDURE — 85610 PROTHROMBIN TIME: CPT

## 2025-05-08 PROCEDURE — 83735 ASSAY OF MAGNESIUM: CPT

## 2025-05-08 PROCEDURE — 2500000003 HC RX 250 WO HCPCS: Performed by: PHYSICIAN ASSISTANT

## 2025-05-08 PROCEDURE — 3600000018 HC SURGERY OHS ADDTL 15MIN: Performed by: THORACIC SURGERY (CARDIOTHORACIC VASCULAR SURGERY)

## 2025-05-08 PROCEDURE — 2580000003 HC RX 258: Performed by: STUDENT IN AN ORGANIZED HEALTH CARE EDUCATION/TRAINING PROGRAM

## 2025-05-08 PROCEDURE — 3600000008 HC SURGERY OHS BASE: Performed by: THORACIC SURGERY (CARDIOTHORACIC VASCULAR SURGERY)

## 2025-05-08 PROCEDURE — 6360000002 HC RX W HCPCS: Performed by: THORACIC SURGERY (CARDIOTHORACIC VASCULAR SURGERY)

## 2025-05-08 PROCEDURE — 2709999900 HC NON-CHARGEABLE SUPPLY: Performed by: THORACIC SURGERY (CARDIOTHORACIC VASCULAR SURGERY)

## 2025-05-08 PROCEDURE — C1729 CATH, DRAINAGE: HCPCS | Performed by: THORACIC SURGERY (CARDIOTHORACIC VASCULAR SURGERY)

## 2025-05-08 PROCEDURE — 2580000003 HC RX 258: Performed by: NURSE ANESTHETIST, CERTIFIED REGISTERED

## 2025-05-08 PROCEDURE — 6370000000 HC RX 637 (ALT 250 FOR IP): Performed by: ANESTHESIOLOGY

## 2025-05-08 PROCEDURE — 6360000002 HC RX W HCPCS: Performed by: NURSE PRACTITIONER

## 2025-05-08 PROCEDURE — 93005 ELECTROCARDIOGRAM TRACING: CPT | Performed by: PHYSICIAN ASSISTANT

## 2025-05-08 PROCEDURE — 6370000000 HC RX 637 (ALT 250 FOR IP): Performed by: PHYSICIAN ASSISTANT

## 2025-05-08 PROCEDURE — 85018 HEMOGLOBIN: CPT

## 2025-05-08 PROCEDURE — 2500000003 HC RX 250 WO HCPCS: Performed by: ANESTHESIOLOGY

## 2025-05-08 PROCEDURE — 3700000001 HC ADD 15 MINUTES (ANESTHESIA): Performed by: THORACIC SURGERY (CARDIOTHORACIC VASCULAR SURGERY)

## 2025-05-08 PROCEDURE — 2580000003 HC RX 258: Performed by: PHYSICIAN ASSISTANT

## 2025-05-08 PROCEDURE — 2580000003 HC RX 258: Performed by: CLINICAL NURSE SPECIALIST

## 2025-05-08 PROCEDURE — 82803 BLOOD GASES ANY COMBINATION: CPT

## 2025-05-08 PROCEDURE — P9045 ALBUMIN (HUMAN), 5%, 250 ML: HCPCS | Performed by: NURSE ANESTHETIST, CERTIFIED REGISTERED

## 2025-05-08 PROCEDURE — C1713 ANCHOR/SCREW BN/BN,TIS/BN: HCPCS | Performed by: THORACIC SURGERY (CARDIOTHORACIC VASCULAR SURGERY)

## 2025-05-08 PROCEDURE — 06BQ4ZZ EXCISION OF LEFT SAPHENOUS VEIN, PERCUTANEOUS ENDOSCOPIC APPROACH: ICD-10-PCS | Performed by: THORACIC SURGERY (CARDIOTHORACIC VASCULAR SURGERY)

## 2025-05-08 PROCEDURE — 71045 X-RAY EXAM CHEST 1 VIEW: CPT

## 2025-05-08 PROCEDURE — 6360000002 HC RX W HCPCS: Performed by: ANESTHESIOLOGY

## 2025-05-08 PROCEDURE — 82962 GLUCOSE BLOOD TEST: CPT

## 2025-05-08 PROCEDURE — B24BZZ4 ULTRASONOGRAPHY OF HEART WITH AORTA, TRANSESOPHAGEAL: ICD-10-PCS | Performed by: THORACIC SURGERY (CARDIOTHORACIC VASCULAR SURGERY)

## 2025-05-08 PROCEDURE — 82330 ASSAY OF CALCIUM: CPT

## 2025-05-08 PROCEDURE — 6360000002 HC RX W HCPCS: Performed by: NURSE ANESTHETIST, CERTIFIED REGISTERED

## 2025-05-08 PROCEDURE — 3700000000 HC ANESTHESIA ATTENDED CARE: Performed by: THORACIC SURGERY (CARDIOTHORACIC VASCULAR SURGERY)

## 2025-05-08 PROCEDURE — 84295 ASSAY OF SERUM SODIUM: CPT

## 2025-05-08 PROCEDURE — 85027 COMPLETE CBC AUTOMATED: CPT

## 2025-05-08 PROCEDURE — 2720000010 HC SURG SUPPLY STERILE: Performed by: THORACIC SURGERY (CARDIOTHORACIC VASCULAR SURGERY)

## 2025-05-08 PROCEDURE — 02100Z9 BYPASS CORONARY ARTERY, ONE ARTERY FROM LEFT INTERNAL MAMMARY, OPEN APPROACH: ICD-10-PCS | Performed by: THORACIC SURGERY (CARDIOTHORACIC VASCULAR SURGERY)

## 2025-05-08 PROCEDURE — 2500000003 HC RX 250 WO HCPCS: Performed by: NURSE ANESTHETIST, CERTIFIED REGISTERED

## 2025-05-08 RX ORDER — EPHEDRINE SULFATE 50 MG/ML
INJECTION INTRAVENOUS
Status: DISCONTINUED | OUTPATIENT
Start: 2025-05-08 | End: 2025-05-08 | Stop reason: SDUPTHER

## 2025-05-08 RX ORDER — GABAPENTIN 100 MG/1
200 CAPSULE ORAL 3 TIMES DAILY
Status: DISCONTINUED | OUTPATIENT
Start: 2025-05-08 | End: 2025-05-12

## 2025-05-08 RX ORDER — IPRATROPIUM BROMIDE AND ALBUTEROL SULFATE 2.5; .5 MG/3ML; MG/3ML
1 SOLUTION RESPIRATORY (INHALATION) EVERY 4 HOURS PRN
Status: DISCONTINUED | OUTPATIENT
Start: 2025-05-08 | End: 2025-05-14 | Stop reason: HOSPADM

## 2025-05-08 RX ORDER — SODIUM CHLORIDE 9 MG/ML
INJECTION, SOLUTION INTRAVENOUS CONTINUOUS
Status: DISCONTINUED | OUTPATIENT
Start: 2025-05-08 | End: 2025-05-14 | Stop reason: HOSPADM

## 2025-05-08 RX ORDER — PROCHLORPERAZINE EDISYLATE 5 MG/ML
5 INJECTION INTRAMUSCULAR; INTRAVENOUS
Status: DISCONTINUED | OUTPATIENT
Start: 2025-05-08 | End: 2025-05-12

## 2025-05-08 RX ORDER — SODIUM CHLORIDE 9 MG/ML
INJECTION, SOLUTION INTRAVENOUS PRN
Status: DISCONTINUED | OUTPATIENT
Start: 2025-05-08 | End: 2025-05-08 | Stop reason: HOSPADM

## 2025-05-08 RX ORDER — DESMOPRESSIN ACETATE 4 UG/ML
INJECTION, SOLUTION INTRAVENOUS; SUBCUTANEOUS
Status: DISCONTINUED | OUTPATIENT
Start: 2025-05-08 | End: 2025-05-08 | Stop reason: SDUPTHER

## 2025-05-08 RX ORDER — SODIUM CHLORIDE 450 MG/100ML
INJECTION, SOLUTION INTRAVENOUS CONTINUOUS
Status: DISCONTINUED | OUTPATIENT
Start: 2025-05-08 | End: 2025-05-14 | Stop reason: HOSPADM

## 2025-05-08 RX ORDER — DIPHENHYDRAMINE HCL 25 MG
25 CAPSULE ORAL NIGHTLY PRN
Status: DISCONTINUED | OUTPATIENT
Start: 2025-05-09 | End: 2025-05-14 | Stop reason: HOSPADM

## 2025-05-08 RX ORDER — OXYCODONE HYDROCHLORIDE 5 MG/1
10 TABLET ORAL EVERY 4 HOURS PRN
Status: DISCONTINUED | OUTPATIENT
Start: 2025-05-08 | End: 2025-05-13

## 2025-05-08 RX ORDER — HYDROMORPHONE HYDROCHLORIDE 1 MG/ML
0.5 INJECTION, SOLUTION INTRAMUSCULAR; INTRAVENOUS; SUBCUTANEOUS EVERY 5 MIN PRN
Refills: 0 | Status: DISCONTINUED | OUTPATIENT
Start: 2025-05-08 | End: 2025-05-09

## 2025-05-08 RX ORDER — BISACODYL 10 MG
10 SUPPOSITORY, RECTAL RECTAL DAILY PRN
Status: DISCONTINUED | OUTPATIENT
Start: 2025-05-08 | End: 2025-05-14 | Stop reason: HOSPADM

## 2025-05-08 RX ORDER — SODIUM CHLORIDE, SODIUM LACTATE, POTASSIUM CHLORIDE, AND CALCIUM CHLORIDE .6; .31; .03; .02 G/100ML; G/100ML; G/100ML; G/100ML
500 INJECTION, SOLUTION INTRAVENOUS ONCE
Status: COMPLETED | OUTPATIENT
Start: 2025-05-08 | End: 2025-05-08

## 2025-05-08 RX ORDER — PHENYLEPHRINE HCL IN 0.9% NACL 0.4MG/10ML
SYRINGE (ML) INTRAVENOUS
Status: DISCONTINUED | OUTPATIENT
Start: 2025-05-08 | End: 2025-05-08 | Stop reason: SDUPTHER

## 2025-05-08 RX ORDER — INSULIN LISPRO 100 [IU]/ML
0-12 INJECTION, SOLUTION INTRAVENOUS; SUBCUTANEOUS
Status: DISCONTINUED | OUTPATIENT
Start: 2025-05-10 | End: 2025-05-14 | Stop reason: HOSPADM

## 2025-05-08 RX ORDER — HEPARIN SODIUM 1000 [USP'U]/ML
INJECTION, SOLUTION INTRAVENOUS; SUBCUTANEOUS
Status: DISCONTINUED | OUTPATIENT
Start: 2025-05-08 | End: 2025-05-08 | Stop reason: SDUPTHER

## 2025-05-08 RX ORDER — SODIUM CHLORIDE 9 MG/ML
INJECTION, SOLUTION INTRAVENOUS
Status: DISCONTINUED | OUTPATIENT
Start: 2025-05-08 | End: 2025-05-08 | Stop reason: SDUPTHER

## 2025-05-08 RX ORDER — ALBUMIN HUMAN 50 G/1000ML
12.5 SOLUTION INTRAVENOUS PRN
Status: COMPLETED | OUTPATIENT
Start: 2025-05-08 | End: 2025-05-08

## 2025-05-08 RX ORDER — DEXAMETHASONE SODIUM PHOSPHATE 4 MG/ML
INJECTION, SOLUTION INTRA-ARTICULAR; INTRALESIONAL; INTRAMUSCULAR; INTRAVENOUS; SOFT TISSUE
Status: DISCONTINUED | OUTPATIENT
Start: 2025-05-08 | End: 2025-05-08 | Stop reason: SDUPTHER

## 2025-05-08 RX ORDER — FENTANYL CITRATE 50 UG/ML
INJECTION, SOLUTION INTRAMUSCULAR; INTRAVENOUS
Status: DISCONTINUED | OUTPATIENT
Start: 2025-05-08 | End: 2025-05-08 | Stop reason: SDUPTHER

## 2025-05-08 RX ORDER — FAMOTIDINE 20 MG/1
20 TABLET, FILM COATED ORAL 2 TIMES DAILY
Status: COMPLETED | OUTPATIENT
Start: 2025-05-08 | End: 2025-05-12

## 2025-05-08 RX ORDER — OXYCODONE HYDROCHLORIDE 5 MG/1
5 TABLET ORAL
Refills: 0 | Status: COMPLETED | OUTPATIENT
Start: 2025-05-08 | End: 2025-05-08

## 2025-05-08 RX ORDER — CHLORHEXIDINE GLUCONATE ORAL RINSE 1.2 MG/ML
15 SOLUTION DENTAL 2 TIMES DAILY
Status: DISCONTINUED | OUTPATIENT
Start: 2025-05-08 | End: 2025-05-14 | Stop reason: HOSPADM

## 2025-05-08 RX ORDER — SENNA AND DOCUSATE SODIUM 50; 8.6 MG/1; MG/1
1 TABLET, FILM COATED ORAL 2 TIMES DAILY
Status: DISCONTINUED | OUTPATIENT
Start: 2025-05-08 | End: 2025-05-14 | Stop reason: HOSPADM

## 2025-05-08 RX ORDER — SODIUM CHLORIDE 9 MG/ML
INJECTION, SOLUTION INTRAVENOUS PRN
Status: DISCONTINUED | OUTPATIENT
Start: 2025-05-08 | End: 2025-05-13

## 2025-05-08 RX ORDER — SODIUM CHLORIDE, SODIUM LACTATE, POTASSIUM CHLORIDE, CALCIUM CHLORIDE 600; 310; 30; 20 MG/100ML; MG/100ML; MG/100ML; MG/100ML
INJECTION, SOLUTION INTRAVENOUS CONTINUOUS
Status: DISCONTINUED | OUTPATIENT
Start: 2025-05-08 | End: 2025-05-08 | Stop reason: HOSPADM

## 2025-05-08 RX ORDER — ONDANSETRON 2 MG/ML
INJECTION INTRAMUSCULAR; INTRAVENOUS
Status: DISCONTINUED | OUTPATIENT
Start: 2025-05-08 | End: 2025-05-08 | Stop reason: SDUPTHER

## 2025-05-08 RX ORDER — LIDOCAINE HYDROCHLORIDE 20 MG/ML
INJECTION, SOLUTION EPIDURAL; INFILTRATION; INTRACAUDAL; PERINEURAL
Status: DISCONTINUED | OUTPATIENT
Start: 2025-05-08 | End: 2025-05-08 | Stop reason: SDUPTHER

## 2025-05-08 RX ORDER — ASPIRIN 81 MG/1
81 TABLET ORAL DAILY
Status: DISCONTINUED | OUTPATIENT
Start: 2025-05-09 | End: 2025-05-14 | Stop reason: HOSPADM

## 2025-05-08 RX ORDER — INSULIN LISPRO 100 [IU]/ML
1-20 INJECTION, SOLUTION INTRAVENOUS; SUBCUTANEOUS
Status: DISCONTINUED | OUTPATIENT
Start: 2025-05-08 | End: 2025-05-10

## 2025-05-08 RX ORDER — ONDANSETRON 2 MG/ML
4 INJECTION INTRAMUSCULAR; INTRAVENOUS EVERY 4 HOURS PRN
Status: DISCONTINUED | OUTPATIENT
Start: 2025-05-08 | End: 2025-05-14 | Stop reason: HOSPADM

## 2025-05-08 RX ORDER — MIDAZOLAM HYDROCHLORIDE 2 MG/2ML
2 INJECTION, SOLUTION INTRAMUSCULAR; INTRAVENOUS PRN
Status: DISCONTINUED | OUTPATIENT
Start: 2025-05-08 | End: 2025-05-08 | Stop reason: HOSPADM

## 2025-05-08 RX ORDER — SODIUM CHLORIDE 0.9 % (FLUSH) 0.9 %
5-40 SYRINGE (ML) INJECTION PRN
Status: DISCONTINUED | OUTPATIENT
Start: 2025-05-08 | End: 2025-05-08 | Stop reason: HOSPADM

## 2025-05-08 RX ORDER — LIDOCAINE HYDROCHLORIDE 10 MG/ML
1 INJECTION, SOLUTION EPIDURAL; INFILTRATION; INTRACAUDAL; PERINEURAL
Status: DISCONTINUED | OUTPATIENT
Start: 2025-05-08 | End: 2025-05-08 | Stop reason: HOSPADM

## 2025-05-08 RX ORDER — POLYETHYLENE GLYCOL 3350 17 G/17G
17 POWDER, FOR SOLUTION ORAL DAILY
Status: DISCONTINUED | OUTPATIENT
Start: 2025-05-09 | End: 2025-05-14 | Stop reason: HOSPADM

## 2025-05-08 RX ORDER — SODIUM CHLORIDE 0.9 % (FLUSH) 0.9 %
5-40 SYRINGE (ML) INJECTION EVERY 12 HOURS SCHEDULED
Status: DISCONTINUED | OUTPATIENT
Start: 2025-05-08 | End: 2025-05-14 | Stop reason: HOSPADM

## 2025-05-08 RX ORDER — ACETAMINOPHEN 500 MG
1000 TABLET ORAL ONCE
Status: COMPLETED | OUTPATIENT
Start: 2025-05-08 | End: 2025-05-08

## 2025-05-08 RX ORDER — ONDANSETRON 2 MG/ML
4 INJECTION INTRAMUSCULAR; INTRAVENOUS
Status: DISCONTINUED | OUTPATIENT
Start: 2025-05-08 | End: 2025-05-09 | Stop reason: SDUPTHER

## 2025-05-08 RX ORDER — SODIUM CHLORIDE 0.9 % (FLUSH) 0.9 %
5-40 SYRINGE (ML) INJECTION PRN
Status: DISCONTINUED | OUTPATIENT
Start: 2025-05-08 | End: 2025-05-13

## 2025-05-08 RX ORDER — SODIUM CHLORIDE 0.9 % (FLUSH) 0.9 %
5-40 SYRINGE (ML) INJECTION PRN
Status: DISCONTINUED | OUTPATIENT
Start: 2025-05-08 | End: 2025-05-14 | Stop reason: HOSPADM

## 2025-05-08 RX ORDER — ACETAMINOPHEN 500 MG
1000 TABLET ORAL EVERY 6 HOURS
Status: DISCONTINUED | OUTPATIENT
Start: 2025-05-08 | End: 2025-05-14 | Stop reason: HOSPADM

## 2025-05-08 RX ORDER — FENTANYL CITRATE 50 UG/ML
25 INJECTION, SOLUTION INTRAMUSCULAR; INTRAVENOUS EVERY 5 MIN PRN
Refills: 0 | Status: DISCONTINUED | OUTPATIENT
Start: 2025-05-08 | End: 2025-05-09

## 2025-05-08 RX ORDER — SODIUM CHLORIDE 0.9 % (FLUSH) 0.9 %
5-40 SYRINGE (ML) INJECTION EVERY 12 HOURS SCHEDULED
Status: DISCONTINUED | OUTPATIENT
Start: 2025-05-08 | End: 2025-05-13

## 2025-05-08 RX ORDER — LANOLIN ALCOHOL/MO/W.PET/CERES
400 CREAM (GRAM) TOPICAL 2 TIMES DAILY
Status: DISCONTINUED | OUTPATIENT
Start: 2025-05-09 | End: 2025-05-14 | Stop reason: HOSPADM

## 2025-05-08 RX ORDER — PROTAMINE SULFATE 10 MG/ML
INJECTION, SOLUTION INTRAVENOUS
Status: DISCONTINUED | OUTPATIENT
Start: 2025-05-08 | End: 2025-05-08 | Stop reason: SDUPTHER

## 2025-05-08 RX ORDER — OXYCODONE HYDROCHLORIDE 5 MG/1
5 TABLET ORAL EVERY 4 HOURS PRN
Status: DISCONTINUED | OUTPATIENT
Start: 2025-05-08 | End: 2025-05-14 | Stop reason: HOSPADM

## 2025-05-08 RX ORDER — HYDRALAZINE HYDROCHLORIDE 20 MG/ML
10 INJECTION INTRAMUSCULAR; INTRAVENOUS ONCE
Status: DISCONTINUED | OUTPATIENT
Start: 2025-05-08 | End: 2025-05-13

## 2025-05-08 RX ORDER — LIDOCAINE 4 G/G
2 PATCH TOPICAL DAILY
Status: DISCONTINUED | OUTPATIENT
Start: 2025-05-08 | End: 2025-05-14 | Stop reason: HOSPADM

## 2025-05-08 RX ORDER — POTASSIUM CHLORIDE 29.8 MG/ML
20 INJECTION INTRAVENOUS PRN
Status: DISCONTINUED | OUTPATIENT
Start: 2025-05-08 | End: 2025-05-13

## 2025-05-08 RX ORDER — DEXMEDETOMIDINE HYDROCHLORIDE 4 UG/ML
.1-1.5 INJECTION, SOLUTION INTRAVENOUS CONTINUOUS
Status: DISCONTINUED | OUTPATIENT
Start: 2025-05-08 | End: 2025-05-10

## 2025-05-08 RX ORDER — INSULIN LISPRO 100 [IU]/ML
0-6 INJECTION, SOLUTION INTRAVENOUS; SUBCUTANEOUS NIGHTLY
Status: DISCONTINUED | OUTPATIENT
Start: 2025-05-10 | End: 2025-05-14 | Stop reason: HOSPADM

## 2025-05-08 RX ORDER — SODIUM CHLORIDE, SODIUM LACTATE, POTASSIUM CHLORIDE, CALCIUM CHLORIDE 600; 310; 30; 20 MG/100ML; MG/100ML; MG/100ML; MG/100ML
INJECTION, SOLUTION INTRAVENOUS
Status: DISCONTINUED | OUTPATIENT
Start: 2025-05-08 | End: 2025-05-08 | Stop reason: SDUPTHER

## 2025-05-08 RX ORDER — ONDANSETRON 2 MG/ML
4 INJECTION INTRAMUSCULAR; INTRAVENOUS ONCE
Status: DISCONTINUED | OUTPATIENT
Start: 2025-05-08 | End: 2025-05-09 | Stop reason: SDUPTHER

## 2025-05-08 RX ORDER — ATORVASTATIN CALCIUM 40 MG/1
40 TABLET, FILM COATED ORAL NIGHTLY
Status: DISCONTINUED | OUTPATIENT
Start: 2025-05-08 | End: 2025-05-09

## 2025-05-08 RX ORDER — AMIODARONE HYDROCHLORIDE 200 MG/1
400 TABLET ORAL 2 TIMES DAILY
Status: DISCONTINUED | OUTPATIENT
Start: 2025-05-09 | End: 2025-05-14 | Stop reason: HOSPADM

## 2025-05-08 RX ORDER — INSULIN GLARGINE 100 [IU]/ML
1-50 INJECTION, SOLUTION SUBCUTANEOUS
Status: COMPLETED | OUTPATIENT
Start: 2025-05-10 | End: 2025-05-10

## 2025-05-08 RX ORDER — ALBUMIN HUMAN 50 G/1000ML
SOLUTION INTRAVENOUS
Status: DISCONTINUED | OUTPATIENT
Start: 2025-05-08 | End: 2025-05-08 | Stop reason: SDUPTHER

## 2025-05-08 RX ORDER — HEPARIN SODIUM 10000 [USP'U]/ML
INJECTION, SOLUTION INTRAVENOUS; SUBCUTANEOUS PRN
Status: DISCONTINUED | OUTPATIENT
Start: 2025-05-08 | End: 2025-05-08 | Stop reason: ALTCHOICE

## 2025-05-08 RX ORDER — CEFAZOLIN SODIUM 1 G/3ML
INJECTION, POWDER, FOR SOLUTION INTRAMUSCULAR; INTRAVENOUS PRN
Status: DISCONTINUED | OUTPATIENT
Start: 2025-05-08 | End: 2025-05-08 | Stop reason: ALTCHOICE

## 2025-05-08 RX ORDER — PAPAVERINE HYDROCHLORIDE 30 MG/ML
INJECTION INTRAMUSCULAR; INTRAVENOUS PRN
Status: DISCONTINUED | OUTPATIENT
Start: 2025-05-08 | End: 2025-05-08 | Stop reason: ALTCHOICE

## 2025-05-08 RX ORDER — DEXTROSE MONOHYDRATE 100 MG/ML
INJECTION, SOLUTION INTRAVENOUS CONTINUOUS PRN
Status: DISCONTINUED | OUTPATIENT
Start: 2025-05-08 | End: 2025-05-14 | Stop reason: HOSPADM

## 2025-05-08 RX ORDER — CLOPIDOGREL BISULFATE 75 MG/1
75 TABLET ORAL DAILY
Status: DISCONTINUED | OUTPATIENT
Start: 2025-05-09 | End: 2025-05-14 | Stop reason: HOSPADM

## 2025-05-08 RX ORDER — NALOXONE HYDROCHLORIDE 0.4 MG/ML
INJECTION, SOLUTION INTRAMUSCULAR; INTRAVENOUS; SUBCUTANEOUS PRN
Status: DISCONTINUED | OUTPATIENT
Start: 2025-05-08 | End: 2025-05-13

## 2025-05-08 RX ORDER — ROCURONIUM BROMIDE 10 MG/ML
INJECTION, SOLUTION INTRAVENOUS
Status: DISCONTINUED | OUTPATIENT
Start: 2025-05-08 | End: 2025-05-08 | Stop reason: SDUPTHER

## 2025-05-08 RX ORDER — FENTANYL CITRATE 50 UG/ML
100 INJECTION, SOLUTION INTRAMUSCULAR; INTRAVENOUS
Refills: 0 | Status: COMPLETED | OUTPATIENT
Start: 2025-05-08 | End: 2025-05-08

## 2025-05-08 RX ORDER — ROPIVACAINE HYDROCHLORIDE 5 MG/ML
INJECTION, SOLUTION EPIDURAL; INFILTRATION; PERINEURAL PRN
Status: DISCONTINUED | OUTPATIENT
Start: 2025-05-08 | End: 2025-05-08 | Stop reason: ALTCHOICE

## 2025-05-08 RX ORDER — MIDAZOLAM HYDROCHLORIDE 2 MG/2ML
1 INJECTION, SOLUTION INTRAMUSCULAR; INTRAVENOUS
Status: DISCONTINUED | OUTPATIENT
Start: 2025-05-08 | End: 2025-05-09

## 2025-05-08 RX ORDER — SODIUM CHLORIDE 0.9 % (FLUSH) 0.9 %
5-40 SYRINGE (ML) INJECTION EVERY 12 HOURS SCHEDULED
Status: DISCONTINUED | OUTPATIENT
Start: 2025-05-08 | End: 2025-05-08 | Stop reason: HOSPADM

## 2025-05-08 RX ORDER — MUPIROCIN 20 MG/G
OINTMENT TOPICAL 2 TIMES DAILY
Status: COMPLETED | OUTPATIENT
Start: 2025-05-08 | End: 2025-05-12

## 2025-05-08 RX ORDER — MAGNESIUM SULFATE IN WATER 40 MG/ML
2000 INJECTION, SOLUTION INTRAVENOUS PRN
Status: DISCONTINUED | OUTPATIENT
Start: 2025-05-08 | End: 2025-05-14 | Stop reason: HOSPADM

## 2025-05-08 RX ORDER — HYDRALAZINE HYDROCHLORIDE 20 MG/ML
10 INJECTION INTRAMUSCULAR; INTRAVENOUS EVERY 6 HOURS PRN
Status: DISCONTINUED | OUTPATIENT
Start: 2025-05-08 | End: 2025-05-14 | Stop reason: HOSPADM

## 2025-05-08 RX ADMIN — MIDAZOLAM HYDROCHLORIDE 2 MG: 1 INJECTION, SOLUTION INTRAMUSCULAR; INTRAVENOUS at 07:11

## 2025-05-08 RX ADMIN — FAMOTIDINE 20 MG: 20 TABLET, FILM COATED ORAL at 21:03

## 2025-05-08 RX ADMIN — HEPARIN SODIUM 40000 UNITS: 1000 INJECTION INTRAVENOUS; SUBCUTANEOUS at 09:12

## 2025-05-08 RX ADMIN — CHLORHEXIDINE GLUCONATE 15 ML: 1.2 RINSE ORAL at 12:35

## 2025-05-08 RX ADMIN — PHENYLEPHRINE HYDROCHLORIDE 120 MCG: 10 INJECTION INTRAVENOUS at 10:37

## 2025-05-08 RX ADMIN — MUPIROCIN: 20 OINTMENT TOPICAL at 21:04

## 2025-05-08 RX ADMIN — PHENYLEPHRINE HYDROCHLORIDE 80 MCG: 10 INJECTION INTRAVENOUS at 09:15

## 2025-05-08 RX ADMIN — ACETAMINOPHEN 1000 MG: 500 TABLET ORAL at 18:13

## 2025-05-08 RX ADMIN — INSULIN HUMAN 5.55 UNITS: 100 INJECTION, SOLUTION PARENTERAL at 09:41

## 2025-05-08 RX ADMIN — Medication 80 MCG: at 07:45

## 2025-05-08 RX ADMIN — SODIUM CHLORIDE, POTASSIUM CHLORIDE, SODIUM LACTATE AND CALCIUM CHLORIDE: 600; 310; 30; 20 INJECTION, SOLUTION INTRAVENOUS at 07:30

## 2025-05-08 RX ADMIN — PHENYLEPHRINE HYDROCHLORIDE 15 MCG/MIN: 50 INJECTION INTRAVENOUS at 12:35

## 2025-05-08 RX ADMIN — EPHEDRINE SULFATE 2.5 MG: 50 INJECTION INTRAVENOUS at 09:19

## 2025-05-08 RX ADMIN — SODIUM CHLORIDE, PRESERVATIVE FREE 10 ML: 5 INJECTION INTRAVENOUS at 21:05

## 2025-05-08 RX ADMIN — DEXMEDETOMIDINE 0.3 MCG/KG/HR: 100 INJECTION, SOLUTION, CONCENTRATE INTRAVENOUS at 07:35

## 2025-05-08 RX ADMIN — FAMOTIDINE 20 MG: 10 INJECTION, SOLUTION INTRAVENOUS at 12:35

## 2025-05-08 RX ADMIN — OXYCODONE 5 MG: 5 TABLET ORAL at 16:49

## 2025-05-08 RX ADMIN — SODIUM CHLORIDE 2.9 UNITS/HR: 9 INJECTION, SOLUTION INTRAVENOUS at 03:26

## 2025-05-08 RX ADMIN — MUPIROCIN: 20 OINTMENT TOPICAL at 12:35

## 2025-05-08 RX ADMIN — Medication 120 MCG: at 07:47

## 2025-05-08 RX ADMIN — PROTAMINE SULFATE 350 MG: 10 INJECTION, SOLUTION INTRAVENOUS at 10:16

## 2025-05-08 RX ADMIN — ALBUMIN (HUMAN) 12.5 G: 12.5 INJECTION, SOLUTION INTRAVENOUS at 13:56

## 2025-05-08 RX ADMIN — SODIUM CHLORIDE 3.9 UNITS/HR: 9 INJECTION, SOLUTION INTRAVENOUS at 07:35

## 2025-05-08 RX ADMIN — PHENYLEPHRINE HYDROCHLORIDE 200 MCG: 10 INJECTION INTRAVENOUS at 10:23

## 2025-05-08 RX ADMIN — Medication 120 MCG: at 07:52

## 2025-05-08 RX ADMIN — PHENYLEPHRINE HYDROCHLORIDE 40 MCG: 10 INJECTION INTRAVENOUS at 09:18

## 2025-05-08 RX ADMIN — MIDAZOLAM HYDROCHLORIDE 2 MG: 1 INJECTION, SOLUTION INTRAMUSCULAR; INTRAVENOUS at 07:20

## 2025-05-08 RX ADMIN — PROPOFOL 30 MG: 10 INJECTION, EMULSION INTRAVENOUS at 10:15

## 2025-05-08 RX ADMIN — ALBUMIN (HUMAN) 12.5 G: 12.5 INJECTION, SOLUTION INTRAVENOUS at 18:16

## 2025-05-08 RX ADMIN — ATORVASTATIN CALCIUM 40 MG: 40 TABLET, FILM COATED ORAL at 21:04

## 2025-05-08 RX ADMIN — DESMOPRESSIN ACETATE 27 MCG: 4 INJECTION, SOLUTION INTRAVENOUS; SUBCUTANEOUS at 10:35

## 2025-05-08 RX ADMIN — FENTANYL CITRATE 50 MCG: 50 INJECTION INTRAMUSCULAR; INTRAVENOUS at 09:07

## 2025-05-08 RX ADMIN — FENTANYL CITRATE 100 MCG: 50 INJECTION INTRAMUSCULAR; INTRAVENOUS at 07:11

## 2025-05-08 RX ADMIN — EPHEDRINE SULFATE 7.5 MG: 50 INJECTION INTRAVENOUS at 10:39

## 2025-05-08 RX ADMIN — DEXAMETHASONE SODIUM PHOSPHATE 4 MG: 4 INJECTION INTRA-ARTICULAR; INTRALESIONAL; INTRAMUSCULAR; INTRAVENOUS; SOFT TISSUE at 08:00

## 2025-05-08 RX ADMIN — OXYCODONE 10 MG: 5 TABLET ORAL at 21:03

## 2025-05-08 RX ADMIN — ROCURONIUM BROMIDE 10 MG: 10 INJECTION, SOLUTION INTRAVENOUS at 08:38

## 2025-05-08 RX ADMIN — ALBUMIN (HUMAN) 12.5 G: 12.5 INJECTION, SOLUTION INTRAVENOUS at 10:39

## 2025-05-08 RX ADMIN — GABAPENTIN 200 MG: 100 CAPSULE ORAL at 21:03

## 2025-05-08 RX ADMIN — ALBUMIN (HUMAN) 12.5 G: 12.5 INJECTION, SOLUTION INTRAVENOUS at 15:08

## 2025-05-08 RX ADMIN — SODIUM CHLORIDE, SODIUM LACTATE, POTASSIUM CHLORIDE, AND CALCIUM CHLORIDE 500 ML: .6; .31; .03; .02 INJECTION, SOLUTION INTRAVENOUS at 12:13

## 2025-05-08 RX ADMIN — PROPOFOL 100 MG: 10 INJECTION, EMULSION INTRAVENOUS at 07:45

## 2025-05-08 RX ADMIN — EPHEDRINE SULFATE 10 MG: 50 INJECTION INTRAVENOUS at 08:38

## 2025-05-08 RX ADMIN — WATER 2000 MG: 1 INJECTION INTRAMUSCULAR; INTRAVENOUS; SUBCUTANEOUS at 11:09

## 2025-05-08 RX ADMIN — ROCURONIUM BROMIDE 100 MG: 10 INJECTION, SOLUTION INTRAVENOUS at 07:46

## 2025-05-08 RX ADMIN — ACETAMINOPHEN 1000 MG: 500 TABLET ORAL at 06:39

## 2025-05-08 RX ADMIN — EPHEDRINE SULFATE 10 MG: 50 INJECTION INTRAVENOUS at 10:11

## 2025-05-08 RX ADMIN — SODIUM CHLORIDE: 9 INJECTION, SOLUTION INTRAVENOUS at 07:32

## 2025-05-08 RX ADMIN — AMINOCAPROIC ACID 10 G/HR: 250 INJECTION, SOLUTION INTRAVENOUS at 07:48

## 2025-05-08 RX ADMIN — LIDOCAINE HYDROCHLORIDE 100 MG: 20 INJECTION, SOLUTION EPIDURAL; INFILTRATION; INTRACAUDAL; PERINEURAL at 07:45

## 2025-05-08 RX ADMIN — ROCURONIUM BROMIDE 10 MG: 10 INJECTION, SOLUTION INTRAVENOUS at 11:19

## 2025-05-08 RX ADMIN — CEFAZOLIN 2000 MG: 1 INJECTION, POWDER, FOR SOLUTION INTRAMUSCULAR; INTRAVENOUS at 19:10

## 2025-05-08 RX ADMIN — CHLORHEXIDINE GLUCONATE 15 ML: 1.2 RINSE ORAL at 21:04

## 2025-05-08 RX ADMIN — SENNOSIDES AND DOCUSATE SODIUM 1 TABLET: 50; 8.6 TABLET ORAL at 21:04

## 2025-05-08 RX ADMIN — EPHEDRINE SULFATE 2.5 MG: 50 INJECTION INTRAVENOUS at 09:13

## 2025-05-08 RX ADMIN — HYDROMORPHONE HYDROCHLORIDE 0.5 MG: 1 INJECTION, SOLUTION INTRAMUSCULAR; INTRAVENOUS; SUBCUTANEOUS at 12:27

## 2025-05-08 RX ADMIN — FENTANYL CITRATE 100 MCG: 50 INJECTION INTRAMUSCULAR; INTRAVENOUS at 08:30

## 2025-05-08 RX ADMIN — PHENYLEPHRINE HYDROCHLORIDE 200 MCG: 10 INJECTION INTRAVENOUS at 10:42

## 2025-05-08 RX ADMIN — SODIUM CHLORIDE: 0.9 INJECTION, SOLUTION INTRAVENOUS at 13:21

## 2025-05-08 RX ADMIN — ONDANSETRON 4 MG: 2 INJECTION INTRAMUSCULAR; INTRAVENOUS at 10:41

## 2025-05-08 RX ADMIN — PHENYLEPHRINE HYDROCHLORIDE 30 MCG/MIN: 10 INJECTION INTRAVENOUS at 07:45

## 2025-05-08 RX ADMIN — DEXMEDETOMIDINE HYDROCHLORIDE 0.4 MCG/KG/HR: 400 INJECTION, SOLUTION INTRAVENOUS at 13:23

## 2025-05-08 RX ADMIN — FENTANYL CITRATE 100 MCG: 50 INJECTION INTRAMUSCULAR; INTRAVENOUS at 09:09

## 2025-05-08 RX ADMIN — WATER 2000 MG: 1 INJECTION INTRAMUSCULAR; INTRAVENOUS; SUBCUTANEOUS at 08:10

## 2025-05-08 RX ADMIN — FENTANYL CITRATE 250 MCG: 50 INJECTION INTRAMUSCULAR; INTRAVENOUS at 07:45

## 2025-05-08 ASSESSMENT — PULMONARY FUNCTION TESTS
PIF_VALUE: 14
PIF_VALUE: 23

## 2025-05-08 ASSESSMENT — PAIN DESCRIPTION - LOCATION
LOCATION: CHEST
LOCATION: CHEST

## 2025-05-08 ASSESSMENT — PAIN SCALES - GENERAL
PAINLEVEL_OUTOF10: 7
PAINLEVEL_OUTOF10: 0
PAINLEVEL_OUTOF10: 6
PAINLEVEL_OUTOF10: 3

## 2025-05-08 ASSESSMENT — PAIN DESCRIPTION - DESCRIPTORS: DESCRIPTORS: ACHING;SHARP

## 2025-05-08 ASSESSMENT — PAIN - FUNCTIONAL ASSESSMENT: PAIN_FUNCTIONAL_ASSESSMENT: 0-10

## 2025-05-08 ASSESSMENT — PAIN DESCRIPTION - ORIENTATION: ORIENTATION: ANTERIOR

## 2025-05-08 NOTE — ANESTHESIA PROCEDURE NOTES
Arterial Line:    An arterial line was placed using ultrasound guidance and surface landmarks, in the pre-op for the following indication(s): continuous blood pressure monitoring and blood sampling needed.    A 20 gauge (size) (length), Arrow (type) catheter was placed, Seldinger technique used, into the right radial artery, secured by Tegaderm.  Anesthesia type: Local  Local infiltration: Injection    Events:  patient tolerated procedure well with no complications.5/8/2025 7:00 AM  Anesthesiologist: Derek Dunham MD  Performed: Anesthesiologist   Preanesthetic Checklist  Completed: patient identified, IV checked, site marked, risks and benefits discussed, surgical/procedural consents, equipment checked, pre-op evaluation, timeout performed, anesthesia consent given, oxygen available, monitors applied/VS acknowledged and fire risk safety assessment completed and verbalized

## 2025-05-08 NOTE — PROGRESS NOTES
Spiritual Health History and Assessment/Progress Note  [facility]    Advance Care Planning,  ,  ,      Name: Ping Salazar MRN: [id number]    Age: 70 y.o.     Sex: female   Language: English   Baptist: Faith   CAD in native artery     Date: 5/8/2025            Total Time Calculated: 15 min              Spiritual Assessment began in [facility] 4 CV INTNSV CARE            Encounter Overview/Reason: Advance Care Planning  Service Provided For: Patient not available    Tarsha, Belief, Meaning:   Patient unable to assess at this time  Family/Friends       Importance and Influence:  Patient   Family/Friends     Community:  Patient   Family/Friends     Assessment and Plan of Care:   Pt was in surgery.  will visit later in this admission    Patient Interventions include:   Family/Friends Interventions include:     Patient Plan of Care: Spiritual Care available upon further referral  Family/Friends Plan of Care:     Electronically signed by ALEXIS Weiss on 5/8/2025 at 4:26 PM  For additional spiritual care, please contact the  on-call at ([phone]).    Lynn Escamilla MDiv, MS, BCC, CGP  Staff   Spiritual Health Services

## 2025-05-08 NOTE — PROGRESS NOTES
1700- 4 Eyes Skin Assessment     NAME:  Ping Salazar  YOB: 1955  MEDICAL RECORD NUMBER:  [id number]    The patient is being assessed for  Admission    I agree that at least one RN has performed a thorough Head to Toe Skin Assessment on the patient. ALL assessment sites listed below have been assessed.      Areas assessed by both nurses:    Head, Face, Ears, Shoulders, Back, Chest, Arms, Elbows, Hands, Sacrum. Buttock, Coccyx, Ischium, Under Medical Devices , and Other unable to view legs r/t ace wraps in place.         Does the Patient have a Wound? Yes wound(s) were present on assessment. LDA wound assessment was Initiated and completed by RN. Blanchable redness on R heel. Excoriation/redness on pubic area       Estevan Prevention initiated by RN: Yes  Wound Care Orders initiated by RN: No    Pressure Injury (Stage 3,4, Unstageable, DTI, NWPT, and Complex wounds) if present, place Wound referral order by RN under : No    New Ostomies, if present place, Ostomy referral order under : No     Nurse 1 eSignature: Electronically signed by Violeta Martinez RN on 5/8/25 at 6:45 PM EDT    **SHARE this note so that the co-signing nurse can place an eSignature**    Nurse 2 eSignature: Electronically signed by Riley Fabian RN on 5/8/25 at 6:46 PM EDT

## 2025-05-08 NOTE — BRIEF OP NOTE
BRIEF OP NOTE  Pre-Op Diagnosis: Disease of cardiovascular system [I25.10]    Post-Op Diagnosis: Disease of cardiovascular system [I25.10]      Procedure: Procedure(s):  CABG X 2 LIMA to LAD, RSVG to OM  BILAT ENDOSCOPIC SAPHENOUS VEIN HARVEST    Surgeon:  Dr. Swati MD    Assistant(s): Derek Morrow PA-C    Anesthesia: General      Infusions:   Rodney, Precedex, Insulin     Estimated Blood Loss: 100 ml     Cell Saver: 500 ml     Bypass Time: 40 min     Cross Clamp Time: 35 min    Specimens: * No specimens in log *    Drains and pacing wires: 2 Atrial Wires  1 Bipolar Ventricular Wire 3 Dominic Drains (2 Mediastinal, 1 L Pleural)     Complications: none    Findings: See full operative note.    Implants: * No implants in log *

## 2025-05-08 NOTE — PROGRESS NOTES
TRANSFER - OUT REPORT:    Verbal report given to LANEY Mcdaniel on Ping Salazar  being transferred to CVICU for routine post-op       Report consisted of patient's Situation, Background, Assessment and   Recommendations(SBAR).     Information from the following report(s) Surgery Report was reviewed with the receiving nurse.           Lines:   Peripheral IV 05/07/25 Right Forearm (Active)   Site Assessment Clean, dry & intact 05/08/25 0427   Line Status Capped;Normal saline locked 05/08/25 0427   Line Care Connections checked and tightened 05/08/25 0427   Phlebitis Assessment No symptoms 05/08/25 0427   Infiltration Assessment 0 05/08/25 0427   Alcohol Cap Used Yes 05/08/25 0427   Dressing Status Clean, dry & intact 05/08/25 0427   Dressing Type Transparent 05/08/25 0427       Peripheral IV 05/08/25 Left;Posterior Forearm (Active)       Arterial Line 05/08/25 Radial (Active)       Introducer 05/08/25 (Active)        Opportunity for questions and clarification was provided.      Patient transported with:  Monitor, O2 @ 10lpm, and Registered Nurse

## 2025-05-08 NOTE — CONSULTS
CRITICAL CARE ADMISSION NOTE      Name: Ping Salazar   : 1955   MRN: [id number]   Date: 2025      Reason for ICU Admission: s/p CABG    ICU PROBLEM LIST   Multivessel CAD s/p CABG x2  HFimpEF  DM-2  HLD  Hx HTN  MDD    HISTORY OF PRESENT ILLNESS:   Patient is a 69yo F w/ PMH as noted above who presents to CVICU post planned CABG x2 (LIMA-LAD, RSVG-OM).     JACOBY Grossly normal biventricular function     CPB 40 min  XC 35 min      ml, w/ 500 ml Cell saver     CXR w/ expected post sternotomy changes, supportive lines in place      2 atrial wires, bipolar ventricular wire, 3 dominic drains       24 HOUR EVENTS:   Pt arrives to CVICU in NSR, intubated, sedated on precedex, phenylephrine, insulin gtt     NEUROLOGICAL:    On Precedex, wean  Monitor mentation as awakens off sedation  As needed pain regimen  Home paxil     PULMONOLOGY:   Lung protective mechanical ventilation  Goal extubation postop day 0  As needed nebs  IS, PFV, out of bed to chair as tolerated once extubated     CARDIOVASCULAR:   Wean phenylephrine as tolerated   Goal MAP greater than 65, SBP less than 130, CI >2, CO >3.5  Pacer wires and Dominic drain management per CT surgery   Telemetry     GASTROINTESTINAL:   Pepcid  ADAT once extubated     RENAL/ELECTROLYTE/FLUIDS:   Strict ins and outs  Daily renal panel  Monitor and replace electrolytes     ENDOCRINE:   Insulin drip per protocol  Glucose goal 120-180     HEMATOLOGY/ONCOLOGY:   SCDs  Chemical prophylaxis as cleared by surgery     ID/MICRO:      Perioperative Ancef      ICU DAILY CHECKLIST      Code Status: Full  DVT Prophylaxis: SCDs  T/L/D: ETT, PAC, A-line, Dominic x3, Taylor   SUP: Pepcid  Diet: ADA T  Activity Level: Ad damon.  ABCDEF Bundle/Checklist Completed:Yes  Disposition: Stay in ICU  Multidisciplinary Rounds Completed:  Yes  Patient/Family Updated: Yes       Review of Systems:     Unable to obtain 2/2 intubation    Past Medical History:      has a past medical history of

## 2025-05-08 NOTE — ANESTHESIA POSTPROCEDURE EVALUATION
Post-Anesthesia Evaluation and Assessment    Patient: Ping Salazar MRN: [id number]  SSN: [id number]    YOB: 1955  Age: 70 y.o.  Sex: female      I have evaluated the patient and they are stable in the ICU.     Cardiovascular Function/Vital Signs  Visit Vitals  /77   Pulse 71   Temp 97.5 °F (36.4 °C)   Resp 14   Ht 1.702 m (5' 7\")   Wt 87.5 kg (192 lb 14.4 oz)   SpO2 100%   BMI 30.21 kg/m²       Patient is status post General anesthesia for Procedure(s):  CORONARY ARTERY BYPASS GRAFTING (CABG) X 2 ; LIMA TO LAD; EVH OF BLE; ECC; JACOBY & EPIAORTIC U/S BY DR. GREEN ([facility]).    Nausea/Vomiting: None    Postoperative hydration reviewed and adequate.    Pain:  Managed    Neurological Status:   Intubated and sedated     Pulmonary Status:   Intubated with adequate ventilation and oxygenation     Complications related to anesthesia: None    Post-anesthesia assessment completed. No concerns    Signed By: Derek Green MD     May 8, 2025

## 2025-05-08 NOTE — ANESTHESIA PROCEDURE NOTES
Central Venous Line:    A central venous line was placed using ultrasound guidance and surface landmarks, in the pre-op for the following indication(s): central venous access and CVP monitoring.5/8/2025 7:00 AM    Sterility preparation included the following: provider used sterile gloves, gown, hat and mask, hand hygiene performed prior to central venous catheter insertion, sterile gel and probe cover used in ultrasound-guided central venous catheter insertion and maximum sterile barriers used during central venous catheter insertion.    The patient was placed in Trendelenburg position.The right internal jugular vein was prepped.    The site was prepped with Chloraprep.  A 9 Fr (size), 12 (length), introducer double lumen was placed.    During the procedure, the following specific steps were taken: target vein identified, needle advanced into vein and blood aspirated and guidewire advanced into vein.    Intravenous verification was obtained by ultrasound, venous blood return and manometry.    Post insertion care included: all ports aspirated, all ports flushed easily, guidewire removed intact, Biopatch applied, line sutured in place and dressing applied.    During the procedure the patient experienced: patient tolerated procedure well with no complications.      Outcomes: uncomplicated  Anesthesia type: local..No    Additional notes:  Assisted by SRNA  Staffing  Performed: Anesthesiologist   Anesthesiologist: Derek Dunham MD  Performed by: Derek Dunham MD  Authorized by: Derek Dunham MD    Preanesthetic Checklist  Completed: patient identified, IV checked, site marked, risks and benefits discussed, surgical/procedural consents, equipment checked, pre-op evaluation, timeout performed, anesthesia consent given, oxygen available, monitors applied/VS acknowledged and fire risk safety assessment completed and verbalized

## 2025-05-08 NOTE — PROGRESS NOTES
Physical Therapy 5/8/2025    Orders received and chart reviewed up to date. Pt POD 0 CABG. Will follow-up tomorrow for PT evaluation/ as appropriate.     Thank you.  Deb Pina, PT, DPT

## 2025-05-08 NOTE — PROGRESS NOTES
1150- Patient arrived from OR. Report received from PA and CRNA. Follow ERAs protocol for extubated. SBP <130 maps >65      1300- fi02 to 40%    1355- Patient awake and following commands    1400- SBT. PEEP to 8    1447- ABG reviewed with Dr. Baum. Ok to extubate    1455- extubated to 4 L    1700- Feet to floor. Patient tolerated well.     2000- Bedside and Verbal shift change report given to Jess  (oncoming nurse) by Violeta (offgoing nurse). Report included the following information Nurse Handoff Report, Intake/Output, MAR, Recent Results, Med Rec Status, and Cardiac Rhythm NSR .

## 2025-05-08 NOTE — PROGRESS NOTES
Pt seen in pre-op holding area by this RN.  Patient stated name and date of birth and procedure and surgeon, which were verified against pt's armband and consent on pt's chart, respectively.  Pt transferred to OR via stretcher with anesthesia.  Pt transferred to table with slide board and three OR staff and anesthesia.  Pt's RIJ MAC and right radial arterial line were reconnected and remain intact.    Pt received 2g Ancef @ 08:10 on 05/08/2025, within one hour of incision time.      Pt received 25mg Metoprolol PO @ 12:26 on 05/08/2025, within 24 hours of incision.

## 2025-05-08 NOTE — PROGRESS NOTES
Cardiac Surgery Coordinator- No family present at this time. Will provide updates via phone.     1000- Placed update call to Cody, the son of Ping Salazar. Reviewed plan of care and provided update from the OR. Will continue to follow and update via phone.     1220- Placed update call to Cody, reviewed post op plan of care. Exchanged contact information and provided him with the unit number and access code.

## 2025-05-08 NOTE — PROGRESS NOTES
Occupational Therapy     Chart reviewed, patient POD0 CABG. Will defer and follow up tomorrow for OT evaluation.     Lynn Nassar MS, OTR/L

## 2025-05-08 NOTE — PLAN OF CARE
Problem: Chronic Conditions and Co-morbidities  Goal: Patient's chronic conditions and co-morbidity symptoms are monitored and maintained or improved  5/8/2025 0001 by Kristan Rogers RN  Outcome: Progressing  Flowsheets (Taken 5/7/2025 2021)  Care Plan - Patient's Chronic Conditions and Co-Morbidity Symptoms are Monitored and Maintained or Improved: Monitor and assess patient's chronic conditions and comorbid symptoms for stability, deterioration, or improvement  5/7/2025 1646 by Sidra Cedillo RN  Outcome: Progressing  Flowsheets (Taken 5/7/2025 1038)  Care Plan - Patient's Chronic Conditions and Co-Morbidity Symptoms are Monitored and Maintained or Improved:   Monitor and assess patient's chronic conditions and comorbid symptoms for stability, deterioration, or improvement   Collaborate with multidisciplinary team to address chronic and comorbid conditions and prevent exacerbation or deterioration     Problem: Discharge Planning  Goal: Discharge to home or other facility with appropriate resources  5/8/2025 0001 by Kristan Rogers RN  Outcome: Progressing  Flowsheets (Taken 5/7/2025 2021)  Discharge to home or other facility with appropriate resources: Identify barriers to discharge with patient and caregiver  5/7/2025 1646 by Sidra Cedillo RN  Outcome: Progressing  Flowsheets (Taken 5/7/2025 1038)  Discharge to home or other facility with appropriate resources: Identify barriers to discharge with patient and caregiver     Problem: ABCDS Injury Assessment  Goal: Absence of physical injury  5/8/2025 0001 by Kristan Rogers RN  Outcome: Progressing  5/7/2025 1646 by Sidra Cedillo RN  Outcome: Progressing     Problem: Safety - Adult  Goal: Free from fall injury  5/8/2025 0001 by Kristan Rogers RN  Outcome: Progressing  5/7/2025 1646 by Sidra Cedillo RN  Outcome: Progressing     Problem: Skin/Tissue Integrity  Goal: Skin integrity remains intact  Description: 1.  Monitor for areas of redness and/or skin

## 2025-05-08 NOTE — ANESTHESIA PRE PROCEDURE
GFRAA >60 11/13/2020 07:00 PM    AGRATIO 1.0 03/11/2023 05:55 PM    LABGLOM >90 05/07/2025 02:33 PM    LABGLOM >60 03/11/2023 05:55 PM    GLUCOSE 210 05/07/2025 02:33 PM    CALCIUM 9.4 05/07/2025 02:33 PM    BILITOT 0.4 05/07/2025 02:33 PM    ALKPHOS 89 05/07/2025 02:33 PM    ALKPHOS 76 03/11/2023 05:55 PM    AST 8 05/07/2025 02:33 PM    ALT 21 05/07/2025 02:33 PM       POC Tests:   Recent Labs     05/08/25  0632   POCGLU 83       Coags:   Lab Results   Component Value Date/Time    PROTIME 10.2 05/07/2025 02:33 PM    INR 1.0 05/07/2025 02:33 PM       HCG (If Applicable): No results found for: \"PREGTESTUR\", \"PREGSERUM\", \"HCG\", \"HCGQUANT\"     ABGs: No results found for: \"PHART\", \"PO2ART\", \"FFL4DCW\", \"RUO2XAD\", \"BEART\", \"Y6DXAEZW\"     Type & Screen (If Applicable):  Lab Results   Component Value Date    ABORH O POSITIVE 05/07/2025    LABANTI NEG 05/07/2025       Drug/Infectious Status (If Applicable):  No results found for: \"HIV\", \"HEPCAB\"    COVID-19 Screening (If Applicable):   Lab Results   Component Value Date/Time    COVID19 Not Detected 10/05/2022 12:45 AM           Anesthesia Evaluation  Patient summary reviewed and Nursing notes reviewed  Airway: Mallampati: II  TM distance: >3 FB   Neck ROM: full  Mouth opening: > = 3 FB   Dental: normal exam         Pulmonary:Negative Pulmonary ROS breath sounds clear to auscultation                             Cardiovascular:  Exercise tolerance: poor (<4 METS)  (+) CAD:, hyperlipidemia        Rhythm: regular  Rate: normal                 ROS comment: ·  Left Ventricle: Normal left ventricular systolic function with a visually estimated EF of 55 - 60%. Left ventricle size is normal. Increased wall thickness. Normal wall motion. Abnormal diastolic function.  ·  Aortic Valve: Mildly thickened cusps. Mildly calcified cusps.  ·  Mitral Valve: Mild annular calcification. Mildly thickened leaflets.  ·  Tricuspid Valve: Mild regurgitation.  ·  Image quality is fair.

## 2025-05-08 NOTE — ANESTHESIA PROCEDURE NOTES
Procedure Performed: JACOBY       Start Time:  5/8/2025 11:02 AM       End Time:      Anesthesia Information  Performed Personally  Anesthesiologist:  Derek Dunham MD        Preanesthesia Checklist:  Patient identified, IV assessed, risks and benefits discussed, monitors and equipment assessed, procedure being performed at surgeon's request and anesthesia consent obtained.    General Procedure Information  Diagnostic Indications for Echo:  assessment of ascending aorta, assessment of surgical repair, hemodynamic monitoring and assessment of valve function  Location performed:  OR  Intubated  Bite block placed  Heart visualized  Probe Insertion:  Easy  Probe Type:  3D, mulitplane, epiaortic and biplane  Modalities:  2D, 3D, color flow mapping, continuous wave Doppler, pulse wave Doppler and M-mode    Echocardiographic and Doppler Measurements    Ventricles    Ventricle  Cavity Size  Cavity          Dimension  Hypertrophy  Thrombus  Global FXN  EF    RV  normal    No  No  normal      LV  normal    No  No  normal (50-70%)       Ventricular Findings Comments:  Borderline normal LVEF 50% with mild global decrease.    Ventricular Regional Function:  1- Basal Anteroseptal:  normal  2- Basal Anterior:  normal  3- Basal Anterolateral:  normal  4- Basal Inferolateral:  normal  5- Basal Inferior:  normal  6- Basal Inferoseptal:  normal  7- Mid Anteroseptal:  normal  8- Mid Anterior:  normal  9- Mid Anterolateral:  normal  10- Mid Inferolateral:  normal  11- Mid Inferior:  normal  12- Mid Inferoseptal:  normal  13- Apical Anterior:  normal  14- Apical Lateral:  normal  15- Apical Inferior:  normal  16- Apical Septal:  normal  17- [place]:  normal    Ventricular Wall Motion Comments:  No WMA    Valves     Valves  Annulus  Stenosis Measurements   Regurg  Leaflet   Morph  Leaflet   Motion Valve Comments    Aortic normal Stenosis none.            none   normal normal Tri-leaflet AV with no stenosis and no AI.      Mitral normal

## 2025-05-09 ENCOUNTER — APPOINTMENT (OUTPATIENT)
Facility: HOSPITAL | Age: 70
DRG: 236 | End: 2025-05-09
Attending: THORACIC SURGERY (CARDIOTHORACIC VASCULAR SURGERY)
Payer: MEDICARE

## 2025-05-09 DIAGNOSIS — Z95.1 POSTSURGICAL AORTOCORONARY BYPASS STATUS: Primary | ICD-10-CM

## 2025-05-09 LAB
ACUTE KIDNEY INJURY RISK NEPHROCHECK: 0.53 (ref 0–0.3)
ANION GAP SERPL CALC-SCNC: 1 MMOL/L (ref 2–12)
ANION GAP SERPL CALC-SCNC: 5 MMOL/L (ref 2–12)
BUN SERPL-MCNC: 19 MG/DL (ref 6–20)
BUN SERPL-MCNC: 20 MG/DL (ref 6–20)
BUN/CREAT SERPL: 32 (ref 12–20)
BUN/CREAT SERPL: 34 (ref 12–20)
CALCIUM SERPL-MCNC: 8.3 MG/DL (ref 8.5–10.1)
CALCIUM SERPL-MCNC: 8.5 MG/DL (ref 8.5–10.1)
CHLORIDE SERPL-SCNC: 110 MMOL/L (ref 97–108)
CHLORIDE SERPL-SCNC: 110 MMOL/L (ref 97–108)
CO2 SERPL-SCNC: 24 MMOL/L (ref 21–32)
CO2 SERPL-SCNC: 26 MMOL/L (ref 21–32)
CREAT SERPL-MCNC: 0.56 MG/DL (ref 0.55–1.02)
CREAT SERPL-MCNC: 0.63 MG/DL (ref 0.55–1.02)
EKG ATRIAL RATE: 76 BPM
EKG DIAGNOSIS: NORMAL
EKG P AXIS: 65 DEGREES
EKG P-R INTERVAL: 190 MS
EKG Q-T INTERVAL: 410 MS
EKG QRS DURATION: 82 MS
EKG QTC CALCULATION (BAZETT): 461 MS
EKG R AXIS: 54 DEGREES
EKG T AXIS: 36 DEGREES
EKG VENTRICULAR RATE: 76 BPM
ERYTHROCYTE [DISTWIDTH] IN BLOOD BY AUTOMATED COUNT: 13.1 % (ref 11.5–14.5)
GLUCOSE BLD STRIP.AUTO-MCNC: 102 MG/DL (ref 65–117)
GLUCOSE BLD STRIP.AUTO-MCNC: 105 MG/DL (ref 65–117)
GLUCOSE BLD STRIP.AUTO-MCNC: 105 MG/DL (ref 65–117)
GLUCOSE BLD STRIP.AUTO-MCNC: 108 MG/DL (ref 65–117)
GLUCOSE BLD STRIP.AUTO-MCNC: 112 MG/DL (ref 65–117)
GLUCOSE BLD STRIP.AUTO-MCNC: 114 MG/DL (ref 65–117)
GLUCOSE BLD STRIP.AUTO-MCNC: 114 MG/DL (ref 65–117)
GLUCOSE BLD STRIP.AUTO-MCNC: 117 MG/DL (ref 65–117)
GLUCOSE BLD STRIP.AUTO-MCNC: 120 MG/DL (ref 65–117)
GLUCOSE BLD STRIP.AUTO-MCNC: 121 MG/DL (ref 65–117)
GLUCOSE BLD STRIP.AUTO-MCNC: 123 MG/DL (ref 65–117)
GLUCOSE BLD STRIP.AUTO-MCNC: 125 MG/DL (ref 65–117)
GLUCOSE BLD STRIP.AUTO-MCNC: 151 MG/DL (ref 65–117)
GLUCOSE BLD STRIP.AUTO-MCNC: 166 MG/DL (ref 65–117)
GLUCOSE BLD STRIP.AUTO-MCNC: 172 MG/DL (ref 65–117)
GLUCOSE BLD STRIP.AUTO-MCNC: 189 MG/DL (ref 65–117)
GLUCOSE BLD STRIP.AUTO-MCNC: 199 MG/DL (ref 65–117)
GLUCOSE BLD STRIP.AUTO-MCNC: 217 MG/DL (ref 65–117)
GLUCOSE BLD STRIP.AUTO-MCNC: 84 MG/DL (ref 65–117)
GLUCOSE BLD STRIP.AUTO-MCNC: 86 MG/DL (ref 65–117)
GLUCOSE BLD STRIP.AUTO-MCNC: 98 MG/DL (ref 65–117)
GLUCOSE SERPL-MCNC: 101 MG/DL (ref 65–100)
GLUCOSE SERPL-MCNC: 119 MG/DL (ref 65–100)
HCT VFR BLD AUTO: 29.6 % (ref 35–47)
HGB BLD-MCNC: 9.9 G/DL (ref 11.5–16)
LACTATE SERPL-SCNC: 1.2 MMOL/L (ref 0.4–2)
LACTATE SERPL-SCNC: 1.4 MMOL/L (ref 0.4–2)
MAGNESIUM SERPL-MCNC: 2.2 MG/DL (ref 1.6–2.4)
MCH RBC QN AUTO: 30.9 PG (ref 26–34)
MCHC RBC AUTO-ENTMCNC: 33.4 G/DL (ref 30–36.5)
MCV RBC AUTO: 92.5 FL (ref 80–99)
NRBC # BLD: 0 K/UL (ref 0–0.01)
NRBC BLD-RTO: 0 PER 100 WBC
PLATELET # BLD AUTO: 210 K/UL (ref 150–400)
PMV BLD AUTO: 9.5 FL (ref 8.9–12.9)
POTASSIUM SERPL-SCNC: 3.7 MMOL/L (ref 3.5–5.1)
POTASSIUM SERPL-SCNC: 4.2 MMOL/L (ref 3.5–5.1)
RBC # BLD AUTO: 3.2 M/UL (ref 3.8–5.2)
SERVICE CMNT-IMP: ABNORMAL
SERVICE CMNT-IMP: NORMAL
SODIUM SERPL-SCNC: 137 MMOL/L (ref 136–145)
SODIUM SERPL-SCNC: 139 MMOL/L (ref 136–145)
WBC # BLD AUTO: 13.8 K/UL (ref 3.6–11)

## 2025-05-09 PROCEDURE — 85027 COMPLETE CBC AUTOMATED: CPT

## 2025-05-09 PROCEDURE — P9045 ALBUMIN (HUMAN), 5%, 250 ML: HCPCS

## 2025-05-09 PROCEDURE — 2500000003 HC RX 250 WO HCPCS: Performed by: ANESTHESIOLOGY

## 2025-05-09 PROCEDURE — 71045 X-RAY EXAM CHEST 1 VIEW: CPT

## 2025-05-09 PROCEDURE — 97530 THERAPEUTIC ACTIVITIES: CPT

## 2025-05-09 PROCEDURE — 97165 OT EVAL LOW COMPLEX 30 MIN: CPT

## 2025-05-09 PROCEDURE — 2000000000 HC ICU R&B

## 2025-05-09 PROCEDURE — 6360000002 HC RX W HCPCS: Performed by: STUDENT IN AN ORGANIZED HEALTH CARE EDUCATION/TRAINING PROGRAM

## 2025-05-09 PROCEDURE — 2580000003 HC RX 258: Performed by: STUDENT IN AN ORGANIZED HEALTH CARE EDUCATION/TRAINING PROGRAM

## 2025-05-09 PROCEDURE — 2500000003 HC RX 250 WO HCPCS: Performed by: STUDENT IN AN ORGANIZED HEALTH CARE EDUCATION/TRAINING PROGRAM

## 2025-05-09 PROCEDURE — 83735 ASSAY OF MAGNESIUM: CPT

## 2025-05-09 PROCEDURE — 83605 ASSAY OF LACTIC ACID: CPT

## 2025-05-09 PROCEDURE — 6370000000 HC RX 637 (ALT 250 FOR IP): Performed by: PHYSICIAN ASSISTANT

## 2025-05-09 PROCEDURE — 97164 PT RE-EVAL EST PLAN CARE: CPT

## 2025-05-09 PROCEDURE — P9045 ALBUMIN (HUMAN), 5%, 250 ML: HCPCS | Performed by: NURSE PRACTITIONER

## 2025-05-09 PROCEDURE — 80048 BASIC METABOLIC PNL TOTAL CA: CPT

## 2025-05-09 PROCEDURE — 6360000002 HC RX W HCPCS

## 2025-05-09 PROCEDURE — 6360000002 HC RX W HCPCS: Performed by: PHYSICIAN ASSISTANT

## 2025-05-09 PROCEDURE — 82962 GLUCOSE BLOOD TEST: CPT

## 2025-05-09 PROCEDURE — 99231 SBSQ HOSP IP/OBS SF/LOW 25: CPT | Performed by: CLINICAL NURSE SPECIALIST

## 2025-05-09 PROCEDURE — 2500000003 HC RX 250 WO HCPCS: Performed by: PHYSICIAN ASSISTANT

## 2025-05-09 PROCEDURE — 2580000003 HC RX 258: Performed by: NURSE PRACTITIONER

## 2025-05-09 PROCEDURE — P9045 ALBUMIN (HUMAN), 5%, 250 ML: HCPCS | Performed by: STUDENT IN AN ORGANIZED HEALTH CARE EDUCATION/TRAINING PROGRAM

## 2025-05-09 PROCEDURE — 6370000000 HC RX 637 (ALT 250 FOR IP): Performed by: NURSE PRACTITIONER

## 2025-05-09 PROCEDURE — 97161 PT EVAL LOW COMPLEX 20 MIN: CPT

## 2025-05-09 PROCEDURE — 6360000002 HC RX W HCPCS: Performed by: NURSE PRACTITIONER

## 2025-05-09 RX ORDER — SODIUM CHLORIDE, SODIUM LACTATE, POTASSIUM CHLORIDE, AND CALCIUM CHLORIDE .6; .31; .03; .02 G/100ML; G/100ML; G/100ML; G/100ML
500 INJECTION, SOLUTION INTRAVENOUS ONCE
Status: COMPLETED | OUTPATIENT
Start: 2025-05-09 | End: 2025-05-09

## 2025-05-09 RX ORDER — NOREPINEPHRINE BITARTRATE 0.06 MG/ML
1-100 INJECTION, SOLUTION INTRAVENOUS CONTINUOUS
Status: DISCONTINUED | OUTPATIENT
Start: 2025-05-09 | End: 2025-05-11

## 2025-05-09 RX ORDER — ATORVASTATIN CALCIUM 40 MG/1
80 TABLET, FILM COATED ORAL NIGHTLY
Status: DISCONTINUED | OUTPATIENT
Start: 2025-05-09 | End: 2025-05-14 | Stop reason: HOSPADM

## 2025-05-09 RX ORDER — METHOCARBAMOL 500 MG/1
500 TABLET, FILM COATED ORAL 3 TIMES DAILY
Status: DISCONTINUED | OUTPATIENT
Start: 2025-05-09 | End: 2025-05-12

## 2025-05-09 RX ORDER — ALBUMIN HUMAN 50 G/1000ML
12.5 SOLUTION INTRAVENOUS ONCE
Status: COMPLETED | OUTPATIENT
Start: 2025-05-09 | End: 2025-05-09

## 2025-05-09 RX ORDER — ALBUMIN HUMAN 50 G/1000ML
SOLUTION INTRAVENOUS
Status: COMPLETED
Start: 2025-05-09 | End: 2025-05-09

## 2025-05-09 RX ADMIN — ASPIRIN 81 MG: 81 TABLET, COATED ORAL at 08:37

## 2025-05-09 RX ADMIN — SODIUM CHLORIDE, SODIUM LACTATE, POTASSIUM CHLORIDE, AND CALCIUM CHLORIDE 500 ML: .6; .31; .03; .02 INJECTION, SOLUTION INTRAVENOUS at 07:46

## 2025-05-09 RX ADMIN — VASOPRESSIN 0.03 UNITS/MIN: 20 INJECTION INTRAVENOUS at 10:23

## 2025-05-09 RX ADMIN — CLOPIDOGREL BISULFATE 75 MG: 75 TABLET ORAL at 08:37

## 2025-05-09 RX ADMIN — SENNOSIDES AND DOCUSATE SODIUM 1 TABLET: 50; 8.6 TABLET ORAL at 21:38

## 2025-05-09 RX ADMIN — ALBUMIN (HUMAN) 12.5 G: 12.5 INJECTION, SOLUTION INTRAVENOUS at 13:51

## 2025-05-09 RX ADMIN — CEFAZOLIN 2000 MG: 1 INJECTION, POWDER, FOR SOLUTION INTRAMUSCULAR; INTRAVENOUS at 19:03

## 2025-05-09 RX ADMIN — SODIUM CHLORIDE, PRESERVATIVE FREE 10 ML: 5 INJECTION INTRAVENOUS at 11:00

## 2025-05-09 RX ADMIN — ALBUMIN (HUMAN) 12.5 G: 12.5 INJECTION, SOLUTION INTRAVENOUS at 03:11

## 2025-05-09 RX ADMIN — ACETAMINOPHEN 1000 MG: 500 TABLET ORAL at 19:03

## 2025-05-09 RX ADMIN — ALBUMIN HUMAN 12.5 G: 50 SOLUTION INTRAVENOUS at 11:59

## 2025-05-09 RX ADMIN — PAROXETINE HYDROCHLORIDE 60 MG: 20 TABLET, FILM COATED ORAL at 08:37

## 2025-05-09 RX ADMIN — FAMOTIDINE 20 MG: 20 TABLET, FILM COATED ORAL at 08:37

## 2025-05-09 RX ADMIN — CHLORHEXIDINE GLUCONATE 15 ML: 1.2 RINSE ORAL at 08:38

## 2025-05-09 RX ADMIN — SODIUM CHLORIDE, SODIUM LACTATE, POTASSIUM CHLORIDE, AND CALCIUM CHLORIDE 500 ML: .6; .31; .03; .02 INJECTION, SOLUTION INTRAVENOUS at 05:34

## 2025-05-09 RX ADMIN — Medication 3.2 UNITS/HR: at 14:16

## 2025-05-09 RX ADMIN — Medication 400 MG: at 08:37

## 2025-05-09 RX ADMIN — FAMOTIDINE 20 MG: 20 TABLET, FILM COATED ORAL at 21:37

## 2025-05-09 RX ADMIN — AMIODARONE HYDROCHLORIDE 400 MG: 200 TABLET ORAL at 08:37

## 2025-05-09 RX ADMIN — SODIUM CHLORIDE, SODIUM LACTATE, POTASSIUM CHLORIDE, AND CALCIUM CHLORIDE 500 ML: .6; .31; .03; .02 INJECTION, SOLUTION INTRAVENOUS at 09:25

## 2025-05-09 RX ADMIN — POLYETHYLENE GLYCOL 3350 17 G: 17 POWDER, FOR SOLUTION ORAL at 08:37

## 2025-05-09 RX ADMIN — MUPIROCIN: 20 OINTMENT TOPICAL at 11:00

## 2025-05-09 RX ADMIN — ALBUMIN (HUMAN) 12.5 G: 12.5 INJECTION, SOLUTION INTRAVENOUS at 11:59

## 2025-05-09 RX ADMIN — ONDANSETRON 4 MG: 2 INJECTION, SOLUTION INTRAMUSCULAR; INTRAVENOUS at 07:23

## 2025-05-09 RX ADMIN — CEFAZOLIN 2000 MG: 1 INJECTION, POWDER, FOR SOLUTION INTRAMUSCULAR; INTRAVENOUS at 03:11

## 2025-05-09 RX ADMIN — OXYCODONE 5 MG: 5 TABLET ORAL at 05:10

## 2025-05-09 RX ADMIN — ATORVASTATIN CALCIUM 80 MG: 40 TABLET, FILM COATED ORAL at 21:37

## 2025-05-09 RX ADMIN — CHLORHEXIDINE GLUCONATE 15 ML: 1.2 RINSE ORAL at 21:45

## 2025-05-09 RX ADMIN — ACETAMINOPHEN 1000 MG: 500 TABLET ORAL at 12:25

## 2025-05-09 RX ADMIN — METHOCARBAMOL 500 MG: 500 TABLET ORAL at 21:38

## 2025-05-09 RX ADMIN — GABAPENTIN 200 MG: 100 CAPSULE ORAL at 21:30

## 2025-05-09 RX ADMIN — Medication 400 MG: at 21:37

## 2025-05-09 RX ADMIN — HYDROMORPHONE HYDROCHLORIDE 0.5 MG: 1 INJECTION, SOLUTION INTRAMUSCULAR; INTRAVENOUS; SUBCUTANEOUS at 12:25

## 2025-05-09 RX ADMIN — ACETAMINOPHEN 1000 MG: 500 TABLET ORAL at 00:18

## 2025-05-09 RX ADMIN — OXYCODONE 5 MG: 5 TABLET ORAL at 19:03

## 2025-05-09 RX ADMIN — OXYCODONE 10 MG: 5 TABLET ORAL at 09:17

## 2025-05-09 RX ADMIN — SODIUM CHLORIDE, PRESERVATIVE FREE 10 ML: 5 INJECTION INTRAVENOUS at 21:49

## 2025-05-09 RX ADMIN — CEFAZOLIN 2000 MG: 1 INJECTION, POWDER, FOR SOLUTION INTRAMUSCULAR; INTRAVENOUS at 11:02

## 2025-05-09 RX ADMIN — AMIODARONE HYDROCHLORIDE 400 MG: 200 TABLET ORAL at 21:38

## 2025-05-09 RX ADMIN — NOREPINEPHRINE BITARTRATE 5 MCG/MIN: 1 INJECTION, SOLUTION, CONCENTRATE INTRAVENOUS at 06:13

## 2025-05-09 RX ADMIN — ACETAMINOPHEN 1000 MG: 500 TABLET ORAL at 05:09

## 2025-05-09 RX ADMIN — SENNOSIDES AND DOCUSATE SODIUM 1 TABLET: 50; 8.6 TABLET ORAL at 08:37

## 2025-05-09 RX ADMIN — GABAPENTIN 200 MG: 100 CAPSULE ORAL at 08:37

## 2025-05-09 RX ADMIN — MUPIROCIN: 20 OINTMENT TOPICAL at 21:45

## 2025-05-09 ASSESSMENT — PAIN SCALES - GENERAL
PAINLEVEL_OUTOF10: 5
PAINLEVEL_OUTOF10: 7
PAINLEVEL_OUTOF10: 6
PAINLEVEL_OUTOF10: 7
PAINLEVEL_OUTOF10: 0
PAINLEVEL_OUTOF10: 0
PAINLEVEL_OUTOF10: 5
PAINLEVEL_OUTOF10: 0

## 2025-05-09 ASSESSMENT — PAIN DESCRIPTION - LOCATION
LOCATION: CHEST
LOCATION: CHEST;INCISION
LOCATION: CHEST

## 2025-05-09 ASSESSMENT — PAIN DESCRIPTION - ORIENTATION: ORIENTATION: ANTERIOR

## 2025-05-09 ASSESSMENT — PAIN DESCRIPTION - DESCRIPTORS: DESCRIPTORS: ACHING

## 2025-05-09 NOTE — PROGRESS NOTES
1930: Bedside and Verbal shift change report given to LANEY Mercado (oncoming nurse) by LANEY Mcdaniel (offgoing nurse). Report included the following information Nurse Handoff Report, Intake/Output, MAR, and Recent Results.     2030: MD Lefty rounding. Updated on low urine output and pressor requirements. No new orders at this time.     2300: MD Lefty updated about GUS of 0.34. No new orders.     0245: MD Lefty rounding updated on pressor requirement. Obtained orders for 250 albumin.     0527: MD Lefty updated on patient requiring 80 of ailyn to maintain MAP greater than 65. RN to give 500 LR bolus.     0606: Per MD Lefty at bedside. RN to start patient on levo    0722: MD Bautista at bedside. RN to give 500ml LR bolus    0730: Bedside and Verbal shift change report given to LANEY Mcdaniel (oncoming nurse) by LANEY Mercado (offgoing nurse). Report included the following information Nurse Handoff Report, Index, Intake/Output, MAR, and Recent Results.     0753: Cardiac surgery rounding. Obtained orders for 500 LR bolus from MD Bautista. Per CRISS Shen RN to stop trending GUS but draw lactic.

## 2025-05-09 NOTE — PROGRESS NOTES
0800- report received. All care assumed  Cardiac surgery rounds. Plan additional 500 LR after current bolus finishes. Recheck labs at 1400.     1200- Albumin 250 ordered    1325- UOP trends reviewed with Wyatt KAHN. Order for an additional 250ml albumin.     2000- Bedside and Verbal shift change report given to Jayla DAVISON (oncoming nurse) by Violeta DAVISON (offgoing nurse). Report included the following information Nurse Handoff Report, Intake/Output, MAR, Recent Results, Med Rec Status, and Cardiac Rhythm NSR .

## 2025-05-09 NOTE — PROGRESS NOTES
Cardiac Surgery Care Coordinator-  Met with Ping Salazar. Reviewed plan of care and discussed goals for the day. Ping Salazar has a good understanding of her plan for the day. Reinforced sternal precautions and encouraged continued use of the incentive spirometer. Ping Salazar can pull 750-1000ml with good effort. Discussed possible discharge date and encouraged Ping Salazar to verbalize. Will continue to follow for educational and emotional needs.

## 2025-05-09 NOTE — PLAN OF CARE
Problem: Physical Therapy - Adult  Goal: By Discharge: Performs mobility at highest level of function for planned discharge setting.  See evaluation for individualized goals.  Description: FUNCTIONAL STATUS PRIOR TO ADMISSION: Patient was independent and active without use of DME, but does report history recent falls.      HOME SUPPORT PRIOR TO ADMISSION: The patient lived with family, but unclear level of s/a available.    Physical Therapy Goals  Initiated 5/9/2025  1.  Patient will move from supine to sit and sit to supine in bed with supervision/set-up within 5 day(s).    2.  Patient will perform sit to stand with supervision/set-up within 5 day(s).  3.  Patient will transfer from bed to chair and chair to bed with supervision/set-up using the least restrictive device within 5 day(s).  4.  Patient will ambulate with supervision/set-up for 150 feet with the least restrictive device within 5 day(s).   5.  Patient will perform cardiac exercises per protocol with supervision/set-up within 5 days.  6.  Patient will verbally recall and functionally demonstrate mindful-based movements (\"move in the tube\") principles without cues within 5 days.   Outcome: Progressing   PHYSICAL THERAPY RE-EVALUATION    Patient: Ping Salazar (70 y.o. female)  Date: 5/9/2025  Primary Diagnosis: CAD (coronary artery disease) [I25.10]  CAD in native artery [I25.10]  Procedure(s) (LRB):  CORONARY ARTERY BYPASS GRAFTING (CABG) X 2 ; LIMA TO LAD; EVH OF BLE; ECC; JACOBY & EPIAORTIC U/S BY DR. GREEN ([facility]) (N/A) 1 Day Post-Op   Precautions: Restrictions/Precautions  Restrictions/Precautions: Fall Risk     Position Activity Restriction  Sternal Precautions: Move in the Tube      ASSESSMENT :  Patient seen for PT re-evaluation now presenting s/p CABG x2 on 5/8.  Patient with limited tolerance to PT intervention due to drowsiness, pain, and labile BP with activity.  Patient requiring significant increase in assistance for all mobility this

## 2025-05-09 NOTE — PLAN OF CARE
Problem: Occupational Therapy - Adult  Goal: By Discharge: Performs self-care activities at highest level of function for planned discharge setting.  See evaluation for individualized goals.  Description: FUNCTIONAL STATUS PRIOR TO ADMISSION: Patient was overall indep for ADL, indep for functional mobility without device. Lived in a house with family members.Endorses extensive fall history, PMH also significant for multiple CVA workups (MRIs all negative from chart review, patient unable to elaborate during evaluation).      Home Equipment: None  Prior Level of Assist for ADLs: Independent  Prior Level of Assist for Homemaking: Independent     Prior Level of Assist for Transfers: Independent  Active : Yes     Occupational Therapy Goals:  Initiated 5/9/2025  1.  Patient will perform standing grooming with Contact Guard Assist within 7 day(s).  2.  Patient will perform LE dressing with Contact Guard Assist within 7 day(s).  3.  Patient will perform UE dressing with Contact Guard Assist within 7 day(s).  4.  Patient will perform toilet transfers with Minimal Assist  within 7 day(s).  5.  Patient will perform all aspects of toileting with Minimal Assist within 7 day(s).  6.  Patient will participate in upper extremity therapeutic exercise/activities with Minimal Assist for 10 minutes within 7 day(s).    7.  Patient will utilize energy conservation techniques during functional activities with verbal cues within 7 day(s).    Outcome: Progressing   OCCUPATIONAL THERAPY EVALUATION    Patient: Ping Salazar (70 y.o. female)  Date: 5/9/2025  Primary Diagnosis: CAD (coronary artery disease) [I25.10]  CAD in native artery [I25.10]  Procedure(s) (LRB):  CORONARY ARTERY BYPASS GRAFTING (CABG) X 2 ; LIMA TO LAD; EVH OF BLE; ECC; JACOBY & EPIAORTIC U/S BY DR. GREEN ([facility]) (N/A) 1 Day Post-Op     Precautions: Fall Risk           Sternal Precautions: Move in the Tube        ASSESSMENT :  The patient is limited by

## 2025-05-09 NOTE — WOUND CARE
Wound Care Note:     New consult placed by nurse request for excoriation in araceli area    Chart shows:  Admitted for CAD in native artery s/p CABG x 2 LIMA to LAD, RSVG to OM, bilateral endoscopic saphenous vein harvest   Past Medical History:   Diagnosis Date    Anxiety     Cerebral artery occlusion with cerebral infarction (HCC)     Depression     Diabetes (HCC)     Falls     Fatigue     Neuropathy     Psychiatric disorder     depression    Snoring     Visual disturbance      WBC = 13.8 on 5/9/25  Estevan = 17    Assessment:   Patient is A&O x 4, communicative, incontinent with moderate assistance needed in repositioning.    Bed: [facility]  Patient has a Taylor.    Diet: Adult diet full liquid; no concentrated sweets with nutritional supplements  Patient reports no pain.      Bilateral heels, buttocks, and sacral skin intact and without erythema.    1. POA patient states constantly wet and uses large araceli-pads, her skin has moisture associated skin damage/chaffing from these pads from lower abdomen and around to lower buttocks.  Triad applied.  Advised patient to apply zinc oxide or triad before placing pad to protect her skin from urine.    Patient repositioned on left side.  Heels offloaded on pillows.     Recommendations:    Lower abdomen, bilateral groin, perineum and lower buttocks- Daily and as needed apply Triad.    Skin Care & Pressure Prevention:  Minimize layers of linen/pads under patient to optimize support surface.    Turn/reposition approximately every 2 hours and offload heels.  Manage incontinence / promote continence   Nourishing Skin Cream to dry skin, minimize use of briefs when able    Discussed above plan with patient & LANEY Mcdaniel    Transition of Care: Plan to follow as needed while admitted to hospital.    Aline \"Emili\" SARA Aleman, RN, CWON  Certified Wound and Ostomy Nurse  office [phone]  Best way to contact me is through Perfect

## 2025-05-09 NOTE — DIABETES MGMT
[facility]  PROGRAM FOR DIABETES HEALTH  DIABETES MANAGEMENT CONSULT    Evaluation and Action Plan   Ping Salazar is a 70 y.o. with uncontrolled T2D with neuropathy, HFimpEF, and TIA (2023).  Admitted to [facility] 5/7 prior to CABG on 5/8 to initiate insulin infusion for A1C 9%.  S/p cardiac cath 4/16/25 as outpatient which showed severe diffuse multi-vessel CAD with normal lvedp.  Diabetes mgmt consulted by cardiac surgery team for advanced nursing diabetes evaluation, inpatient BG mgmt while patient is receiving insulin infusion both pre and post op.      Patient reports living with diabetes since in her 40's.  Verified she is prescribed Jardiance 10mg and Metformin 1000 mg BID by her cardiologist.  She is not established with PCP.  She endorses she was \"just started\" on the Jardiance recently.  She admits to not taking her Metformin like she should and on average usually takes one pill per day and does miss 1 full day of metformin in a week's time.  She was not monitoring her BG PTA.  She was initially reluctant to disclose her diet practices but her son (at bedside) encouraged her to report her diet intake.  She usually eats a [employer] breakfast of biscuit and gravy with hash brown , does admit to eating out a lot and occasionally enjoys milkshakes or ice cream sandwiches. Counseled patient re: diet adjustments and modifying her portions of starches and sweets.  She was receptive to the initial education provided.  Also discussed her elevated A1C and likelihood of medication adjustments at discharge.       Plan: Insulin infusion initiated day before surgery due to elevated A1C.  Now post op day 1 on 5/9.  Patient noted to be sitting up in bedside chair today.  RN reported high needs from insulin infusion.  Past 24h needs 37.6 units.  Hourly rate overnight 1u/hr and higher at meals. Plant to transition off insulin 5/10 with daily basal insulin and bolus insulin as appropriate.   Blood glucose

## 2025-05-09 NOTE — PROGRESS NOTES
CRITICAL CARE NOTE      Name: Ping Salazar   : 1955   MRN: [id number]   Date: 2025      Reason for ICU Admission: s/p CABG    ICU PROBLEM LIST   Multivessel CAD s/p CABG x2  HFimpEF  Perioperative hypotension, vasoplegia  DM-2  HLD  Hx HTN  MDD    HISTORY OF PRESENT ILLNESS:   Patient is a 71yo F w/ PMH as noted above who presents to CVICU post planned CABG x2 (LIMA-LAD, RSVG-OM).     24 HOUR EVENTS:   Extubated POD 0, NAEON. Remains on vasopressor support. Orthostatis this AM, ongoing fluid resuscitation     NEUROLOGICAL:    Monitor mentation   As needed pain regimen  Home paxil     PULMONOLOGY:   O2 per NC for spO2 90-98%  As needed nebs  IS, PFV, out of bed to chair as tolerated      CARDIOVASCULAR:   Wean levophed, phenylephrine as tolerated   IVFs to euvolemia  Goal MAP greater than 65, SBP less than 130, CI >2, CO >3.5  Pacer wires and Dominic drain management per CT surgery   Telemetry     GASTROINTESTINAL:   Pepcid  ADAT      RENAL/ELECTROLYTE/FLUIDS:   Strict ins and outs  Daily renal panel  Monitor and replace electrolytes     ENDOCRINE:   Insulin drip per protocol  Glucose goal 120-180     HEMATOLOGY/ONCOLOGY:   SCDs  Chemical prophylaxis as cleared by surgery     ID/MICRO:      Perioperative Ancef      ICU DAILY CHECKLIST      Code Status: Full  DVT Prophylaxis: SCDs  T/L/D: CVC, A-line, Dominic x3, Taylor   SUP: Pepcid  Diet: ADA T  Activity Level: Ad damon.  ABCDEF Bundle/Checklist Completed:Yes  Disposition: Stay in ICU  Multidisciplinary Rounds Completed:  Yes  Patient/Family Updated: Yes       Review of Systems:     Negative except as noted above    OBJECTIVE:     Labs and Data: Reviewed 25  Medications: Reviewed 25  Imaging: Reviewed 25    BP 90/74   Pulse 70   Temp 98.3 °F (36.8 °C) (Oral)   Resp 12   Ht 1.702 m (5' 7\")   Wt 91.6 kg (201 lb 14.4 oz)   SpO2 100%   BMI 31.62 kg/m²      Temp (24hrs), Av.9 °F (36.6 °C), Min:97.3 °F (36.3 °C), Max:99 °F (37.2

## 2025-05-09 NOTE — PROGRESS NOTES
Cardiac Surgery ICU Progress Note    Admit Date: 2025  POD:  1 Day Post-Op    Procedure:    25 with Dr. Longoria:   CORONARY ARTERY BYPASS GRAFTING (CABG) X 2 ; (LIMA TO LAD, SVG-OM) ; EVH OF BLE; ECC; JACOBY & EPIAORTIC U/S BY DR. GREEN ([facility])       [facility] Synopsis:  : Direct admit for insulin infusion, A1c 8.9.    DOS: Elective CABG with Dr. Longoria. Intra-op JACOBY with normal biventricular function, EF 55%. Transferred to CVICU on rodney, precedex, and insulin with AV wires and 3 filiberto drains (2 med, L pleural). Extubated at 1455.  Norepi added. 1L LR and Albumin x 1 given.    POD 1: Vaso added this morning, CVP low, additional fluid resuscitation ordered. PT/OT evaluated, during discussion disclosed multiple falls     Subjective:   Patient seen with Dr. Longoria, up in chair, 2L NC, afebrile, SR 70s. She states she is having moderate pain in her left upper shoulder.      Objective:   Vitals:  Blood pressure (!) 115/48, pulse 82, temperature 98.1 °F (36.7 °C), temperature source Oral, resp. rate 14, height 1.702 m (5' 7\"), weight 91.6 kg (201 lb 14.4 oz), SpO2 93%, not currently breastfeeding.  Temp (24hrs), Av.9 °F (36.6 °C), Min:97.3 °F (36.3 °C), Max:99 °F (37.2 °C)    CVP: 3-9    Continuous Infusions:  Norepi 5 mcg/min  Rodney 60  Insulin    Oxygen Flow Rate: O2 Flow Rate (L/min): 2 L/min    Oxygen Delivery Method: O2 Device: None (Room air)    EKG/Rhythm:  SR 60s      Extubation Date / Time:  at 1455    CT Output: 690mL (340mL overnight)  AV wires     CXR:  Xray Result (most recent):  XR CHEST PORTABLE 2025    Narrative  EXAM:  XR CHEST PORTABLE    INDICATION: Post op open heart surgery        Comparison to 2025. Portable exam obtained at 413 demonstrates interval  removal of the ET tube and NG tube. Left-sided chest tube is stable in position.  Improved inspiratory effort and aeration lower lobes compared to the prior exam.    Impression  Improved aeration lower lobes bilaterally

## 2025-05-09 NOTE — PROGRESS NOTES
Physician Progress Note      PATIENT:               TONYA JACOBS  CSN #:                  [id number]  :                       1955  ADMIT DATE:       2025 10:11 AM  DISCH DATE:  RESPONDING  PROVIDER #:        Leonard Baum MD          QUERY TEXT:    Uncontrolled diabetes is documented in the medical record.   Please clarify if   the uncontrolled status is with hyperglycemia    The clinical indicators include:  Per labs--POC bs 394, bs 210  -Per PN-\"Uncontrolled diabetes type 2: insulin infusion. Diabetes   Management consult.\"  -Per DM educator-\"Significant diabetes-related events over the past 24-72   hours  A1C 9.0% in 2025 Insulin infusion pre op on   Fasting BG:Pre-prandial: 394 at start of insulin infusion\"  Options provided:  -- Uncontrolled DM with hyperglycemia  -- Elevated blood sugars, not clinically significant  -- Other - I will add my own diagnosis  -- Disagree - Not applicable / Not valid  -- Disagree - Clinically unable to determine / Unknown  -- Refer to Clinical Documentation Reviewer    PROVIDER RESPONSE TEXT:    This patient has uncontrolled DM with hyperglycemia.    Query created by: Eva North on 2025 7:52 AM      Electronically signed by:  Leonard Baum MD 2025 11:31 AM

## 2025-05-10 ENCOUNTER — APPOINTMENT (OUTPATIENT)
Facility: HOSPITAL | Age: 70
DRG: 236 | End: 2025-05-10
Attending: THORACIC SURGERY (CARDIOTHORACIC VASCULAR SURGERY)
Payer: MEDICARE

## 2025-05-10 LAB
ANION GAP SERPL CALC-SCNC: 10 MMOL/L (ref 2–12)
BUN SERPL-MCNC: 17 MG/DL (ref 6–20)
BUN/CREAT SERPL: 41 (ref 12–20)
CALCIUM SERPL-MCNC: 8.3 MG/DL (ref 8.5–10.1)
CHLORIDE SERPL-SCNC: 108 MMOL/L (ref 97–108)
CO2 SERPL-SCNC: 20 MMOL/L (ref 21–32)
CREAT SERPL-MCNC: 0.41 MG/DL (ref 0.55–1.02)
ERYTHROCYTE [DISTWIDTH] IN BLOOD BY AUTOMATED COUNT: 13.3 % (ref 11.5–14.5)
GLUCOSE BLD STRIP.AUTO-MCNC: 100 MG/DL (ref 65–117)
GLUCOSE BLD STRIP.AUTO-MCNC: 105 MG/DL (ref 65–117)
GLUCOSE BLD STRIP.AUTO-MCNC: 124 MG/DL (ref 65–117)
GLUCOSE BLD STRIP.AUTO-MCNC: 143 MG/DL (ref 65–117)
GLUCOSE BLD STRIP.AUTO-MCNC: 155 MG/DL (ref 65–117)
GLUCOSE BLD STRIP.AUTO-MCNC: 170 MG/DL (ref 65–117)
GLUCOSE BLD STRIP.AUTO-MCNC: 187 MG/DL (ref 65–117)
GLUCOSE BLD STRIP.AUTO-MCNC: 198 MG/DL (ref 65–117)
GLUCOSE BLD STRIP.AUTO-MCNC: 199 MG/DL (ref 65–117)
GLUCOSE BLD STRIP.AUTO-MCNC: 96 MG/DL (ref 65–117)
GLUCOSE SERPL-MCNC: 121 MG/DL (ref 65–100)
HCT VFR BLD AUTO: 27 % (ref 35–47)
HGB BLD-MCNC: 8.8 G/DL (ref 11.5–16)
MAGNESIUM SERPL-MCNC: 2.1 MG/DL (ref 1.6–2.4)
MCH RBC QN AUTO: 31.4 PG (ref 26–34)
MCHC RBC AUTO-ENTMCNC: 32.6 G/DL (ref 30–36.5)
MCV RBC AUTO: 96.4 FL (ref 80–99)
NRBC # BLD: 0 K/UL (ref 0–0.01)
NRBC BLD-RTO: 0 PER 100 WBC
PLATELET # BLD AUTO: 140 K/UL (ref 150–400)
PMV BLD AUTO: 9.6 FL (ref 8.9–12.9)
POTASSIUM SERPL-SCNC: 4.1 MMOL/L (ref 3.5–5.1)
RBC # BLD AUTO: 2.8 M/UL (ref 3.8–5.2)
SERVICE CMNT-IMP: ABNORMAL
SERVICE CMNT-IMP: NORMAL
SODIUM SERPL-SCNC: 138 MMOL/L (ref 136–145)
WBC # BLD AUTO: 11.5 K/UL (ref 3.6–11)

## 2025-05-10 PROCEDURE — 2500000003 HC RX 250 WO HCPCS: Performed by: ANESTHESIOLOGY

## 2025-05-10 PROCEDURE — 71045 X-RAY EXAM CHEST 1 VIEW: CPT

## 2025-05-10 PROCEDURE — 6370000000 HC RX 637 (ALT 250 FOR IP): Performed by: NURSE PRACTITIONER

## 2025-05-10 PROCEDURE — 6370000000 HC RX 637 (ALT 250 FOR IP): Performed by: PHYSICIAN ASSISTANT

## 2025-05-10 PROCEDURE — 83735 ASSAY OF MAGNESIUM: CPT

## 2025-05-10 PROCEDURE — 6360000002 HC RX W HCPCS: Performed by: NURSE PRACTITIONER

## 2025-05-10 PROCEDURE — 82962 GLUCOSE BLOOD TEST: CPT

## 2025-05-10 PROCEDURE — 97530 THERAPEUTIC ACTIVITIES: CPT

## 2025-05-10 PROCEDURE — 6360000002 HC RX W HCPCS: Performed by: PHYSICIAN ASSISTANT

## 2025-05-10 PROCEDURE — 2580000003 HC RX 258: Performed by: NURSE PRACTITIONER

## 2025-05-10 PROCEDURE — 2500000003 HC RX 250 WO HCPCS: Performed by: PHYSICIAN ASSISTANT

## 2025-05-10 PROCEDURE — 2000000000 HC ICU R&B

## 2025-05-10 PROCEDURE — 97116 GAIT TRAINING THERAPY: CPT

## 2025-05-10 PROCEDURE — 85027 COMPLETE CBC AUTOMATED: CPT

## 2025-05-10 PROCEDURE — 80048 BASIC METABOLIC PNL TOTAL CA: CPT

## 2025-05-10 RX ADMIN — SENNOSIDES AND DOCUSATE SODIUM 1 TABLET: 50; 8.6 TABLET ORAL at 08:50

## 2025-05-10 RX ADMIN — METHOCARBAMOL 500 MG: 500 TABLET ORAL at 08:52

## 2025-05-10 RX ADMIN — GABAPENTIN 200 MG: 100 CAPSULE ORAL at 21:09

## 2025-05-10 RX ADMIN — OXYCODONE 5 MG: 5 TABLET ORAL at 03:00

## 2025-05-10 RX ADMIN — INSULIN GLARGINE 25 UNITS: 100 INJECTION, SOLUTION SUBCUTANEOUS at 10:27

## 2025-05-10 RX ADMIN — Medication 400 MG: at 21:09

## 2025-05-10 RX ADMIN — FAMOTIDINE 20 MG: 20 TABLET, FILM COATED ORAL at 21:08

## 2025-05-10 RX ADMIN — GABAPENTIN 200 MG: 100 CAPSULE ORAL at 08:50

## 2025-05-10 RX ADMIN — FAMOTIDINE 20 MG: 20 TABLET, FILM COATED ORAL at 08:50

## 2025-05-10 RX ADMIN — SODIUM CHLORIDE, PRESERVATIVE FREE 10 ML: 5 INJECTION INTRAVENOUS at 21:10

## 2025-05-10 RX ADMIN — ACETAMINOPHEN 1000 MG: 500 TABLET ORAL at 16:57

## 2025-05-10 RX ADMIN — Medication 400 MG: at 08:50

## 2025-05-10 RX ADMIN — CEFAZOLIN 2000 MG: 1 INJECTION, POWDER, FOR SOLUTION INTRAMUSCULAR; INTRAVENOUS at 02:49

## 2025-05-10 RX ADMIN — IRON SUCROSE 200 MG: 20 INJECTION, SOLUTION INTRAVENOUS at 09:05

## 2025-05-10 RX ADMIN — METHOCARBAMOL 500 MG: 500 TABLET ORAL at 13:38

## 2025-05-10 RX ADMIN — OXYCODONE 5 MG: 5 TABLET ORAL at 14:54

## 2025-05-10 RX ADMIN — SODIUM CHLORIDE, PRESERVATIVE FREE 10 ML: 5 INJECTION INTRAVENOUS at 08:52

## 2025-05-10 RX ADMIN — OXYCODONE 10 MG: 5 TABLET ORAL at 07:12

## 2025-05-10 RX ADMIN — PAROXETINE HYDROCHLORIDE 60 MG: 20 TABLET, FILM COATED ORAL at 08:50

## 2025-05-10 RX ADMIN — GABAPENTIN 200 MG: 100 CAPSULE ORAL at 13:38

## 2025-05-10 RX ADMIN — INSULIN LISPRO 1 UNITS: 100 INJECTION, SOLUTION INTRAVENOUS; SUBCUTANEOUS at 21:26

## 2025-05-10 RX ADMIN — HYDROMORPHONE HYDROCHLORIDE 0.5 MG: 1 INJECTION, SOLUTION INTRAMUSCULAR; INTRAVENOUS; SUBCUTANEOUS at 15:36

## 2025-05-10 RX ADMIN — SENNOSIDES AND DOCUSATE SODIUM 1 TABLET: 50; 8.6 TABLET ORAL at 21:09

## 2025-05-10 RX ADMIN — METHOCARBAMOL 500 MG: 500 TABLET ORAL at 21:09

## 2025-05-10 RX ADMIN — CHLORHEXIDINE GLUCONATE 15 ML: 1.2 RINSE ORAL at 08:51

## 2025-05-10 RX ADMIN — MUPIROCIN: 20 OINTMENT TOPICAL at 21:09

## 2025-05-10 RX ADMIN — CHLORHEXIDINE GLUCONATE 15 ML: 1.2 RINSE ORAL at 21:09

## 2025-05-10 RX ADMIN — ONDANSETRON 4 MG: 2 INJECTION, SOLUTION INTRAMUSCULAR; INTRAVENOUS at 07:12

## 2025-05-10 RX ADMIN — INSULIN LISPRO 2 UNITS: 100 INJECTION, SOLUTION INTRAVENOUS; SUBCUTANEOUS at 17:38

## 2025-05-10 RX ADMIN — INSULIN LISPRO 2 UNITS: 100 INJECTION, SOLUTION INTRAVENOUS; SUBCUTANEOUS at 14:14

## 2025-05-10 RX ADMIN — AMIODARONE HYDROCHLORIDE 400 MG: 200 TABLET ORAL at 08:50

## 2025-05-10 RX ADMIN — POLYETHYLENE GLYCOL 3350 17 G: 17 POWDER, FOR SOLUTION ORAL at 08:50

## 2025-05-10 RX ADMIN — AMIODARONE HYDROCHLORIDE 400 MG: 200 TABLET ORAL at 21:09

## 2025-05-10 RX ADMIN — SODIUM CHLORIDE, PRESERVATIVE FREE 10 ML: 5 INJECTION INTRAVENOUS at 08:53

## 2025-05-10 RX ADMIN — CLOPIDOGREL BISULFATE 75 MG: 75 TABLET ORAL at 08:50

## 2025-05-10 RX ADMIN — ACETAMINOPHEN 1000 MG: 500 TABLET ORAL at 00:03

## 2025-05-10 RX ADMIN — ACETAMINOPHEN 1000 MG: 500 TABLET ORAL at 06:02

## 2025-05-10 RX ADMIN — ASPIRIN 81 MG: 81 TABLET, COATED ORAL at 08:50

## 2025-05-10 RX ADMIN — MUPIROCIN: 20 OINTMENT TOPICAL at 08:51

## 2025-05-10 RX ADMIN — ATORVASTATIN CALCIUM 80 MG: 40 TABLET, FILM COATED ORAL at 21:08

## 2025-05-10 RX ADMIN — INSULIN LISPRO 3 UNITS: 100 INJECTION, SOLUTION INTRAVENOUS; SUBCUTANEOUS at 09:12

## 2025-05-10 ASSESSMENT — PAIN DESCRIPTION - ORIENTATION
ORIENTATION: ANTERIOR
ORIENTATION: ANTERIOR;MID;LEFT
ORIENTATION: ANTERIOR
ORIENTATION: ANTERIOR
ORIENTATION: ANTERIOR;MID;LEFT
ORIENTATION: ANTERIOR;MID
ORIENTATION: ANTERIOR;MID
ORIENTATION: ANTERIOR
ORIENTATION: ANTERIOR

## 2025-05-10 ASSESSMENT — PAIN - FUNCTIONAL ASSESSMENT
PAIN_FUNCTIONAL_ASSESSMENT: ACTIVITIES ARE NOT PREVENTED

## 2025-05-10 ASSESSMENT — PAIN SCALES - GENERAL
PAINLEVEL_OUTOF10: 4
PAINLEVEL_OUTOF10: 9
PAINLEVEL_OUTOF10: 7
PAINLEVEL_OUTOF10: 0
PAINLEVEL_OUTOF10: 4
PAINLEVEL_OUTOF10: 2
PAINLEVEL_OUTOF10: 7
PAINLEVEL_OUTOF10: 3
PAINLEVEL_OUTOF10: 6
PAINLEVEL_OUTOF10: 3

## 2025-05-10 ASSESSMENT — PAIN DESCRIPTION - DESCRIPTORS
DESCRIPTORS: THROBBING;ACHING
DESCRIPTORS: THROBBING;ACHING
DESCRIPTORS: ACHING

## 2025-05-10 ASSESSMENT — PAIN DESCRIPTION - LOCATION
LOCATION: CHEST

## 2025-05-10 ASSESSMENT — PAIN DESCRIPTION - FREQUENCY
FREQUENCY: CONTINUOUS
FREQUENCY: INTERMITTENT

## 2025-05-10 ASSESSMENT — PAIN DESCRIPTION - ONSET
ONSET: ON-GOING

## 2025-05-10 ASSESSMENT — PAIN DESCRIPTION - PAIN TYPE
TYPE: SURGICAL PAIN

## 2025-05-10 NOTE — CARE COORDINATION
Care Management Initial Assessment       RUR:15%  Readmission? No  1st IM letter given? Yes -  5/7/25  1st  letter given: N/A    Patient is currently in CVICU.    Transition of Care Plan:    RUR: 15%  Prior Level of Functioning: independent  [facility]  PT and OT are following.  ELIGIO: 5/14/25  If SNF or IPR: Date FOC offered: N/A  Date FOC received:   Accepting facility:   Date authorization started with reference number:   Date authorization received and expires:   Follow up appointments:   DME needed: None  Transportation at discharge: family  IM/IMM Medicare/ letter given: 1st 5/7/25  Is patient a [place] and connected with [facility]?    If yes, was  transfer form completed and [facility] notified?   Caregiver Contact: son Ulisses Jacobs  Discharge Caregiver contacted prior to discharge?   Care Conference needed?   Barriers to discharge:     Reason for Admission: Multivessel CAD       S/P 5/6/25 CABG x2                Plan for utilizing home health:  TBD        PCP: First and Last name:  No, Pcp  Previous PCP left the practice. Patient has been referred to a new PCP.    Current Advanced Directive/Advance Care Plan: Full Code     Healthcare Decision Maker:   Click here to complete HealthCare Decision Makers including selection of the Healthcare Decision Maker Relationship (ie \"Primary\")             Primary Decision Maker: ULISSES JACOBS - Child - [phone]    Secondary Decision Maker: Jenni Quiñonez - Child - [phone]                  CM met with patient. Patient lives with ex , son age 40, 19 year old granddaughter. Patient lives in a  one story house. Local family support includes daughter. Patient was ambulatory prior to admission. Patient confirmed health insurance and prescription coverage.   Previous home health :None  Previous IPR/ SNF rehab :None  DME :None     05/10/25 1129   Service Assessment   Patient Orientation Alert and Oriented   Cognition Alert

## 2025-05-10 NOTE — PROGRESS NOTES
2000- Report from Violeta, patient vitals stable, drips verified; care assumed.    Uneventful shift, levo weaned to 3 mcg/min    0800- Bedside and Verbal shift change report given to Britany (oncoming nurse) by Jayla (offgoing nurse). Report included the following information Nurse Handoff Report and Intake/Output.

## 2025-05-10 NOTE — PROGRESS NOTES
Spiritual Health History and Assessment/Progress Note  [facility]    Advance Care Planning,  ,  ,      Name: Ping Salazar MRN: [id number]    Age: 70 y.o.     Sex: female   Language: English   Jew: Faith   CAD in native artery     Date: 5/10/2025            Total Time Calculated: 65 min              Spiritual Assessment began in [facility] 4 CV INTNSV CARE        Referral/Consult From: Multi-disciplinary team   Encounter Overview/Reason: Advance Care Planning  Service Provided For: Patient    Tarsha, Belief, Meaning:   Patient unable to assess at this time  Family/Friends No family/friends present      Importance and Influence:  Patient has no beliefs influential to healthcare decision-making identified during this visit  Family/Friends No family/friends present    Community:  Patient feels well-supported. Support system includes: Children and Friends  Family/Friends No family/friends present    Assessment and Plan of Care:     Patient Interventions include: Assisted in Advance Care Planning conversation  Family/Friends Interventions include: No family/friends present    Patient Plan of Care: Spiritual Care available upon further referral  Family/Friends Plan of Care: No family/friends present    I visited Ping Salazar in response to a request for an AMD conversation. She choose to complete an AMD. She names her son Cody Salazar as MPOA and her daughter Jenni Quiñonez as secondary.            Electronically signed by ALEXIS WHITMAN on 5/10/2025 at 11:26 AM

## 2025-05-10 NOTE — PROGRESS NOTES
CRITICAL CARE NOTE      Name: Ping Salazar   : 1955   MRN: [id number]   Date: 5/10/2025      Reason for ICU Admission: s/p CABG    ICU PROBLEM LIST   Multivessel CAD s/p CABG x2  HFimpEF  Perioperative hypotension, vasoplegia  DM-2  HLD  Hx HTN  MDD    HISTORY OF PRESENT ILLNESS:   Patient is a 69yo F w/ PMH as noted above who presents to CVICU post planned CABG x2 (LIMA-LAD, RSVG-OM).     24 HOUR EVENTS:   No acute events overnight.  Able to get out of bed to chair.  Chest tube output decreased.  Weaning O2 via NC as tolerated.  Patient utilizing IS well.  Remains on insulin infusion per protocol.  Continuing to wean pressors.     NEUROLOGICAL:    Monitor mentation   As needed pain regimen  Home paxil     PULMONOLOGY:   Weaning O2 via NC for spO2 90-98%; extubated POD 0  As needed nebs  IS, PFV, out of bed to chair as tolerated      CARDIOVASCULAR:   Wean levophed, s/p ailyn   S/p IVFs  Goal MAP greater than 65, SBP less than 130, CI >2, CO >3.5  Pacer wires and Dominic drain management per CT surgery   Telemetry     GASTROINTESTINAL:   Pepcid  ADAT      RENAL/ELECTROLYTE/FLUIDS:   Strict ins and outs  Daily renal panel  Monitor and replace electrolytes     ENDOCRINE:   Insulin drip per protocol  Glucose goal 120-180     HEMATOLOGY/ONCOLOGY:   SCDs  Chemical prophylaxis as cleared by surgery     ID/MICRO:      Perioperative Ancef      ICU DAILY CHECKLIST      Code Status: Full  DVT Prophylaxis: SCDs  T/L/D: CVC, A-line, Dominic x3, Taylor   SUP: Pepcid  Diet: ADA T  Activity Level: OOBC, PT OT  ABCDEF Bundle/Checklist Completed:Yes  Disposition: Stay in ICU  Multidisciplinary Rounds Completed:  Yes  Patient/Family Updated: Yes       Review of Systems:     Negative except as noted above    OBJECTIVE:     Labs and Data: Reviewed 05/10/25  Medications: Reviewed 05/10/25  Imaging: Reviewed 05/10/25    /63   Pulse 91   Temp 98 °F (36.7 °C) (Oral)   Resp 20   Ht 1.702 m (5' 7\")   Wt 94.2 kg (207 lb 10.8

## 2025-05-10 NOTE — PROGRESS NOTES
Spiritual Health History and Assessment/Progress Note  [facility]    Follow-up,  ,  ,      Name: Ping Salazar MRN: [id number]    Age: 70 y.o.     Sex: female   Language: English   Hoahaoism: Sabianist   CAD in native artery     Date: 5/10/2025            Total Time Calculated: 30 min              Spiritual Assessment continued in [facility] 4 CV INTNSV CARE        Referral/Consult From: Nurse   Encounter Overview/Reason: Follow-up  Service Provided For: Patient    Tarsha, Belief, Meaning:   Patient has beliefs or practices that help with coping during difficult times  Family/Friends No family/friends present      Importance and Influence:  Patient has no beliefs influential to healthcare decision-making identified during this visit  Family/Friends No family/friends present    Community:  Patient feels well-supported. Support system includes: Children and Other: Dog, Pit Bull, Jaky  Family/Friends No family/friends present    Assessment and Plan of Care:     Patient Interventions include: Facilitated expression of thoughts and feelings and Affirmed coping skills/support systems  Family/Friends Interventions include: No family/friends present    Patient Plan of Care: Spiritual Care available upon further referral  Family/Friends Plan of Care: No family/friends present    As I was rounding in CVICU, a nurse suggested that I visit with Ping (71 y/o), who I had already prioritized as she was at risk for loneliness and social isolation. As I approached, Ping was talking to her dog, a Pit Bull named \"Jaky\", over the phone. I'm assuming that her son, Cody, was on the other end of the line with Jaky, as Ping lives with him, the dog, and her ex-. She seemed overjoyed to be attempting to connect with Jaky, and it gave me a great \"gateway to care\". She was happy to talk about her dog, but then she also began to talk about some of her regrets regarding her home life and the difficulties that she's had making

## 2025-05-10 NOTE — PROGRESS NOTES
0856: Bedside and Verbal shift change report received from Jayla RN to Britany RN. Report included the following information Nurse Handoff Report, Index, Surgery Report, Intake/Output, MAR, Recent Results, Med Rec Status, Cardiac Rhythm NSR, and Alarm Parameters.     0815: Cardiac surgery rounding at bedside,  plan to wean to Levophed.     1044: PT/OT at bedside working with patient.    1125: Antoine NP at bedside, CT removed.    1915: Arterial line removed.    1945: Bedside and Verbal shift change report given to Jayla DAVISON (oncoming nurse) by Britany RN (offgoing nurse). Report included the following information Nurse Handoff Report, Index, Intake/Output, MAR, Recent Results, Med Rec Status, Cardiac Rhythm NSR, and Alarm Parameters.

## 2025-05-10 NOTE — ACP (ADVANCE CARE PLANNING)
Advance Care Planning     Advance Care Planning Inpatient Note  [facility] Department    Today's Date: 5/10/2025  Unit: [facility] 4 CV INTNSV CARE    Received request from admission screening.  Upon review of chart and communication with care team, patient's decision making abilities are not in question.. Patient and Friends was/were present in the room during visit.    Goals of ACP Conversation:  Discuss advance care planning documents  Facilitate a discussion related to patient's goals of care as they align with the patient's values and beliefs.    Health Care Decision Makers:       Primary Decision Maker: ULISSES JACOBS - Child - [phone]    Secondary Decision Maker: OlamideJenni - Child - [phone]  Summary:  Completed New Documents    Advance Care Planning Documents (Patient Wishes):  Healthcare Power of /Advance Directive Appointment of Health Care Agent     Assessment:  I visited Ping Jacobs in response to a request for an AMD conversation. She choose to complete an AMD. She names her son Ulisses Jacobs as MPOA and her daughter Jenni Quiñonez as secondary.          Interventions:  Provided education on documents for clarity and greater understanding  Discussed and provided education on state decision maker hierarchy  Assisted in the completion of documents according to patient's wishes at this time  Encouraged ongoing ACP conversation with future decision makers and loved ones    Care Preferences Communicated:   No    Outcomes/Plan:  New advance directive completed.  Returned original document(s) to patient, as well as copies for distribution to appointed agents  Copy of advance directive given to staff to scan into medical record.  Teach Back Method used to verify the patient's and/or Healthcare Decision Maker's understanding of key information in the advance directive documents    Electronically signed by ALEXIS WHITMAN on 5/10/2025 at 11:21 AM

## 2025-05-10 NOTE — PROGRESS NOTES
Cardiac Surgery ICU Progress Note    Admit Date: 2025  POD:  2 Days Post-Op    Procedure:  Procedure(s):  CORONARY ARTERY BYPASS GRAFTING (CABG) X 2 ; LIMA TO LAD; EVH OF BLE; ECC; JACOBY & EPIAORTIC U/S BY DR. GREEN ([facility])       [facility] Synopsis:  : Direct admit for insulin infusion, A1c 8.9.    DOS: Elective CABG with Dr. Longoria. Intra-op JACOBY with normal biventricular function, EF 55%. Transferred to CVICU on ailyn, precedex, and insulin with AV wires and 3 filiberto drains (2 med, L pleural). Extubated at 1455.  Norepi added. 1L LR and Albumin x 1 given.    POD 1: Vaso added this morning, CVP low, additional fluid resuscitation ordered. PT/OT evaluated, during discussion disclosed multiple falls  5/10 POD2: chest tubes removed.     Subjective:   Patient seen with Dr. Longoria  2LNC  SR   Levophed at 5 mc (failed to wean)  Chest tubes removed.     No complaints.      Objective:   Vitals:  Blood pressure 117/63, pulse 91, temperature 98 °F (36.7 °C), temperature source Oral, resp. rate 20, height 1.702 m (5' 7\"), weight 94.2 kg (207 lb 10.8 oz), SpO2 94%, not currently breastfeeding.  Temp (24hrs), Av.6 °F (37 °C), Min:98 °F (36.7 °C), Max:99.1 °F (37.3 °C)      Ventilator Settings:      Mode Rate TV Press PEEP FiO2 PIP Min. Vent   CPAP/PS 18 bpm  400 mL    5 40 %  14 cmH2O           Oxygen Flow Rate: O2 Flow Rate (L/min): 2 L/min    Oxygen Delivery Method: O2 Device: None (Room air)      [place]-Kvng  Toledo-Kvng  CVP (Mean): 10 mmHg       EKG/Rhythm:  SR     Extubation Date / Time:  at 1455     CT Output: 280/120     CXR:  Redemonstration of right jugular access access central venous catheter  projecting over the SVC, precordial and precardiac/left pleural tube drains with  intact median sternotomy wires and multiple CABG clips.     Lungs/Pleura: Right basilar atelectasis. No pleural effusion or pneumothorax.   Mediastinum: Heart, miguel, mediastinum are otherwise within normal limits.  MSK: No acute osseous

## 2025-05-10 NOTE — PLAN OF CARE
Problem: Physical Therapy - Adult  Goal: By Discharge: Performs mobility at highest level of function for planned discharge setting.  See evaluation for individualized goals.  Description: FUNCTIONAL STATUS PRIOR TO ADMISSION: Patient was independent and active without use of DME, but does report history recent falls.      HOME SUPPORT PRIOR TO ADMISSION: The patient lived with family, but unclear level of s/a available.    Physical Therapy Goals  Initiated 5/9/2025  1.  Patient will move from supine to sit and sit to supine in bed with supervision/set-up within 5 day(s).    2.  Patient will perform sit to stand with supervision/set-up within 5 day(s).  3.  Patient will transfer from bed to chair and chair to bed with supervision/set-up using the least restrictive device within 5 day(s).  4.  Patient will ambulate with supervision/set-up for 150 feet with the least restrictive device within 5 day(s).   5.  Patient will perform cardiac exercises per protocol with supervision/set-up within 5 days.  6.  Patient will verbally recall and functionally demonstrate mindful-based movements (\"move in the tube\") principles without cues within 5 days.   Outcome: Progressing       PHYSICAL THERAPY TREATMENT    Patient: Ping Salazar (70 y.o. female)  Date: 5/10/2025  Diagnosis: CAD (coronary artery disease) [I25.10]  CAD in native artery [I25.10] CAD in native artery  Procedure(s) (LRB):  CORONARY ARTERY BYPASS GRAFTING (CABG) X 2 ; LIMA TO LAD; EVH OF BLE; ECC; JACOBY & EPIAORTIC U/S BY DR. GREEN ([facility]) (N/A) 2 Days Post-Op  Precautions: Restrictions/Precautions  Restrictions/Precautions: Fall Risk     Position Activity Restriction  Sternal Precautions: Move in the Tube      ASSESSMENT:  Patient continues to benefit from skilled PT services and is progressing towards goals. Pt was able to increase mobility and decrease assistance. Pt required v.c for sequencing, adherence to move in the tube, and safety. Pt fatigued with

## 2025-05-11 ENCOUNTER — APPOINTMENT (OUTPATIENT)
Facility: HOSPITAL | Age: 70
DRG: 236 | End: 2025-05-11
Attending: THORACIC SURGERY (CARDIOTHORACIC VASCULAR SURGERY)
Payer: MEDICARE

## 2025-05-11 LAB
ANION GAP SERPL CALC-SCNC: 4 MMOL/L (ref 2–12)
BUN SERPL-MCNC: 19 MG/DL (ref 6–20)
BUN/CREAT SERPL: 35 (ref 12–20)
CALCIUM SERPL-MCNC: 8.3 MG/DL (ref 8.5–10.1)
CHLORIDE SERPL-SCNC: 106 MMOL/L (ref 97–108)
CO2 SERPL-SCNC: 26 MMOL/L (ref 21–32)
CREAT SERPL-MCNC: 0.54 MG/DL (ref 0.55–1.02)
ERYTHROCYTE [DISTWIDTH] IN BLOOD BY AUTOMATED COUNT: 13.3 % (ref 11.5–14.5)
GLUCOSE BLD STRIP.AUTO-MCNC: 166 MG/DL (ref 65–117)
GLUCOSE BLD STRIP.AUTO-MCNC: 249 MG/DL (ref 65–117)
GLUCOSE BLD STRIP.AUTO-MCNC: 261 MG/DL (ref 65–117)
GLUCOSE BLD STRIP.AUTO-MCNC: 268 MG/DL (ref 65–117)
GLUCOSE SERPL-MCNC: 158 MG/DL (ref 65–100)
HCT VFR BLD AUTO: 25.3 % (ref 35–47)
HGB BLD-MCNC: 8.2 G/DL (ref 11.5–16)
MAGNESIUM SERPL-MCNC: 2.2 MG/DL (ref 1.6–2.4)
MCH RBC QN AUTO: 30.9 PG (ref 26–34)
MCHC RBC AUTO-ENTMCNC: 32.4 G/DL (ref 30–36.5)
MCV RBC AUTO: 95.5 FL (ref 80–99)
NRBC # BLD: 0 K/UL (ref 0–0.01)
NRBC BLD-RTO: 0 PER 100 WBC
PLATELET # BLD AUTO: 137 K/UL (ref 150–400)
PMV BLD AUTO: 9.9 FL (ref 8.9–12.9)
POTASSIUM SERPL-SCNC: 4.1 MMOL/L (ref 3.5–5.1)
RBC # BLD AUTO: 2.65 M/UL (ref 3.8–5.2)
SERVICE CMNT-IMP: ABNORMAL
SODIUM SERPL-SCNC: 136 MMOL/L (ref 136–145)
WBC # BLD AUTO: 8.3 K/UL (ref 3.6–11)

## 2025-05-11 PROCEDURE — 85027 COMPLETE CBC AUTOMATED: CPT

## 2025-05-11 PROCEDURE — 83735 ASSAY OF MAGNESIUM: CPT

## 2025-05-11 PROCEDURE — 6360000002 HC RX W HCPCS: Performed by: PHYSICIAN ASSISTANT

## 2025-05-11 PROCEDURE — 2500000003 HC RX 250 WO HCPCS: Performed by: ANESTHESIOLOGY

## 2025-05-11 PROCEDURE — 71045 X-RAY EXAM CHEST 1 VIEW: CPT

## 2025-05-11 PROCEDURE — 80048 BASIC METABOLIC PNL TOTAL CA: CPT

## 2025-05-11 PROCEDURE — 6370000000 HC RX 637 (ALT 250 FOR IP): Performed by: NURSE PRACTITIONER

## 2025-05-11 PROCEDURE — 82962 GLUCOSE BLOOD TEST: CPT

## 2025-05-11 PROCEDURE — 97116 GAIT TRAINING THERAPY: CPT

## 2025-05-11 PROCEDURE — 6370000000 HC RX 637 (ALT 250 FOR IP): Performed by: PHYSICIAN ASSISTANT

## 2025-05-11 PROCEDURE — 6360000002 HC RX W HCPCS: Performed by: NURSE PRACTITIONER

## 2025-05-11 PROCEDURE — 2580000003 HC RX 258: Performed by: NURSE PRACTITIONER

## 2025-05-11 PROCEDURE — 97530 THERAPEUTIC ACTIVITIES: CPT

## 2025-05-11 PROCEDURE — 2060000000 HC ICU INTERMEDIATE R&B

## 2025-05-11 RX ORDER — SODIUM PHOSPHATE, DIBASIC AND SODIUM PHOSPHATE, MONOBASIC 7; 19 G/230ML; G/230ML
1 ENEMA RECTAL DAILY PRN
Status: DISCONTINUED | OUTPATIENT
Start: 2025-05-11 | End: 2025-05-14 | Stop reason: HOSPADM

## 2025-05-11 RX ORDER — METOPROLOL TARTRATE 25 MG/1
12.5 TABLET, FILM COATED ORAL 2 TIMES DAILY
Status: DISCONTINUED | OUTPATIENT
Start: 2025-05-11 | End: 2025-05-12

## 2025-05-11 RX ORDER — GINSENG 100 MG
CAPSULE ORAL PRN
Status: DISCONTINUED | OUTPATIENT
Start: 2025-05-11 | End: 2025-05-14 | Stop reason: HOSPADM

## 2025-05-11 RX ORDER — GINSENG 100 MG
CAPSULE ORAL ONCE
Status: COMPLETED | OUTPATIENT
Start: 2025-05-11 | End: 2025-05-11

## 2025-05-11 RX ORDER — FUROSEMIDE 10 MG/ML
20 INJECTION INTRAMUSCULAR; INTRAVENOUS DAILY
Status: DISCONTINUED | OUTPATIENT
Start: 2025-05-11 | End: 2025-05-12

## 2025-05-11 RX ADMIN — ATORVASTATIN CALCIUM 80 MG: 40 TABLET, FILM COATED ORAL at 21:31

## 2025-05-11 RX ADMIN — INSULIN LISPRO 2 UNITS: 100 INJECTION, SOLUTION INTRAVENOUS; SUBCUTANEOUS at 09:36

## 2025-05-11 RX ADMIN — CHLORHEXIDINE GLUCONATE 15 ML: 1.2 RINSE ORAL at 21:33

## 2025-05-11 RX ADMIN — ACETAMINOPHEN 1000 MG: 500 TABLET ORAL at 04:11

## 2025-05-11 RX ADMIN — ONDANSETRON 4 MG: 2 INJECTION, SOLUTION INTRAMUSCULAR; INTRAVENOUS at 04:10

## 2025-05-11 RX ADMIN — CHLORHEXIDINE GLUCONATE 15 ML: 1.2 RINSE ORAL at 08:38

## 2025-05-11 RX ADMIN — PAROXETINE HYDROCHLORIDE 60 MG: 20 TABLET, FILM COATED ORAL at 08:34

## 2025-05-11 RX ADMIN — MUPIROCIN: 20 OINTMENT TOPICAL at 21:32

## 2025-05-11 RX ADMIN — IRON SUCROSE 200 MG: 20 INJECTION, SOLUTION INTRAVENOUS at 08:49

## 2025-05-11 RX ADMIN — OXYCODONE 5 MG: 5 TABLET ORAL at 17:46

## 2025-05-11 RX ADMIN — BACITRACIN 1 PACKET: 500 OINTMENT TOPICAL at 11:21

## 2025-05-11 RX ADMIN — FAMOTIDINE 20 MG: 20 TABLET, FILM COATED ORAL at 21:30

## 2025-05-11 RX ADMIN — OXYCODONE 5 MG: 5 TABLET ORAL at 08:35

## 2025-05-11 RX ADMIN — ASPIRIN 81 MG: 81 TABLET, COATED ORAL at 08:36

## 2025-05-11 RX ADMIN — ACETAMINOPHEN 1000 MG: 500 TABLET ORAL at 17:46

## 2025-05-11 RX ADMIN — INSULIN LISPRO 6 UNITS: 100 INJECTION, SOLUTION INTRAVENOUS; SUBCUTANEOUS at 12:04

## 2025-05-11 RX ADMIN — GABAPENTIN 200 MG: 100 CAPSULE ORAL at 08:35

## 2025-05-11 RX ADMIN — AMIODARONE HYDROCHLORIDE 400 MG: 200 TABLET ORAL at 08:36

## 2025-05-11 RX ADMIN — Medication 400 MG: at 08:36

## 2025-05-11 RX ADMIN — AMIODARONE HYDROCHLORIDE 400 MG: 200 TABLET ORAL at 21:31

## 2025-05-11 RX ADMIN — FUROSEMIDE 20 MG: 10 INJECTION, SOLUTION INTRAMUSCULAR; INTRAVENOUS at 11:21

## 2025-05-11 RX ADMIN — INSULIN LISPRO 4 UNITS: 100 INJECTION, SOLUTION INTRAVENOUS; SUBCUTANEOUS at 17:48

## 2025-05-11 RX ADMIN — OXYCODONE 5 MG: 5 TABLET ORAL at 04:10

## 2025-05-11 RX ADMIN — METHOCARBAMOL 500 MG: 500 TABLET ORAL at 21:31

## 2025-05-11 RX ADMIN — INSULIN LISPRO 3 UNITS: 100 INJECTION, SOLUTION INTRAVENOUS; SUBCUTANEOUS at 21:58

## 2025-05-11 RX ADMIN — GABAPENTIN 200 MG: 100 CAPSULE ORAL at 14:04

## 2025-05-11 RX ADMIN — SENNOSIDES AND DOCUSATE SODIUM 1 TABLET: 50; 8.6 TABLET ORAL at 21:32

## 2025-05-11 RX ADMIN — FAMOTIDINE 20 MG: 20 TABLET, FILM COATED ORAL at 08:35

## 2025-05-11 RX ADMIN — METHOCARBAMOL 500 MG: 500 TABLET ORAL at 14:03

## 2025-05-11 RX ADMIN — GABAPENTIN 200 MG: 100 CAPSULE ORAL at 21:30

## 2025-05-11 RX ADMIN — CLOPIDOGREL BISULFATE 75 MG: 75 TABLET ORAL at 08:36

## 2025-05-11 RX ADMIN — SODIUM CHLORIDE, PRESERVATIVE FREE 10 ML: 5 INJECTION INTRAVENOUS at 08:54

## 2025-05-11 RX ADMIN — METHOCARBAMOL 500 MG: 500 TABLET ORAL at 08:35

## 2025-05-11 RX ADMIN — POLYETHYLENE GLYCOL 3350 17 G: 17 POWDER, FOR SOLUTION ORAL at 08:36

## 2025-05-11 RX ADMIN — Medication 400 MG: at 21:31

## 2025-05-11 RX ADMIN — SENNOSIDES AND DOCUSATE SODIUM 1 TABLET: 50; 8.6 TABLET ORAL at 08:36

## 2025-05-11 RX ADMIN — MUPIROCIN: 20 OINTMENT TOPICAL at 08:38

## 2025-05-11 ASSESSMENT — PAIN SCALES - GENERAL
PAINLEVEL_OUTOF10: 3
PAINLEVEL_OUTOF10: 3
PAINLEVEL_OUTOF10: 5
PAINLEVEL_OUTOF10: 6
PAINLEVEL_OUTOF10: 5

## 2025-05-11 ASSESSMENT — PAIN DESCRIPTION - DESCRIPTORS
DESCRIPTORS: ACHING;THROBBING
DESCRIPTORS: ACHING

## 2025-05-11 ASSESSMENT — PAIN DESCRIPTION - PAIN TYPE
TYPE: SURGICAL PAIN

## 2025-05-11 ASSESSMENT — PAIN DESCRIPTION - LOCATION
LOCATION: CHEST

## 2025-05-11 ASSESSMENT — PAIN DESCRIPTION - FREQUENCY
FREQUENCY: INTERMITTENT
FREQUENCY: INTERMITTENT

## 2025-05-11 ASSESSMENT — PAIN DESCRIPTION - ORIENTATION
ORIENTATION: ANTERIOR;MID
ORIENTATION: ANTERIOR
ORIENTATION: ANTERIOR;MID

## 2025-05-11 ASSESSMENT — PAIN - FUNCTIONAL ASSESSMENT
PAIN_FUNCTIONAL_ASSESSMENT: ACTIVITIES ARE NOT PREVENTED
PAIN_FUNCTIONAL_ASSESSMENT: ACTIVITIES ARE NOT PREVENTED

## 2025-05-11 ASSESSMENT — PAIN DESCRIPTION - ONSET
ONSET: ON-GOING
ONSET: ON-GOING

## 2025-05-11 NOTE — PROGRESS NOTES
Physical Therapy - 05/11/25    Orders acknowledged and chart reviewed. Note patient evaluated by PT on 5/9, pt seen daily 5/10 and 5/11, duplicate orders completed as patient currently on caseload, will continue to be seen per PT Plan of Care listed on evaluation. Also note plan for HHPT at [facility] pending progress. Will continue to follow. Thank you.         Romina Johnson, PT

## 2025-05-11 NOTE — PROGRESS NOTES
[facility] FLOOR Progress Note    Admit Date: 2025  POD: 3 Days Post-Op      Procedure:    CORONARY ARTERY BYPASS GRAFTING (CABG) X 2 ; LIMA TO LAD; EVH OF BLE; ECC; JACOBY & EPIAORTIC U/S BY DR. GREEN ([facility])        [facility] Synopsis:  : Direct admit for insulin infusion, A1c 8.9.    DOS: Elective CABG with Dr. Longoria. Intra-op JACOBY with normal biventricular function, EF 55%. Transferred to CVICU on ailyn, precedex, and insulin with AV wires and 3 filiberto drains (2 med, L pleural). Extubated at 1455.  Norepi added. 1L LR and Albumin x 1 given.    POD 1: Vaso added this morning, CVP low, additional fluid resuscitation ordered. PT/OT evaluated, during discussion disclosed multiple falls  5/10 POD2: chest tubes removed. 2LNC Levophed 5 mc, SR.    POD3: SR. RA. Transfer to step down  Subjective:   Pt seen with Dr. Longoria  RA  SR    Transfer to step down   No complaints.   Objective:     BP (!) 93/49   Pulse 80   Temp 98 °F (36.7 °C) (Oral)   Resp 19   Ht 1.702 m (5' 7\")   Wt 94.2 kg (207 lb 10.8 oz)   SpO2 95%   BMI 32.53 kg/m²   Temp (24hrs), Av.2 °F (36.8 °C), Min:98 °F (36.7 °C), Max:98.4 °F (36.9 °C)      Last 24hr Input/Output:    Intake/Output Summary (Last 24 hours) at 2025 1052  Last data filed at 2025 1000  Gross per 24 hour   Intake 2337.19 ml   Output 1235 ml   Net 1102.19 ml        EKG/Rhythm: SR       Oxygen: Room air    CXR:  Direct comparison made to prior chest x-ray dated May 10, 2025.     Cardiomediastinal silhouette is stable. Median sternotomy wires are noted. There  is a right internal jugular central venous catheter which extends to the  proximal SVC. There is bibasilar patchy airspace disease. The left-sided chest  tube has been removed. There is no pneumothorax. No pleural fluid is visualized.     IMPRESSION:  Bibasilar patchy airspace disease. Interval removal of left-sided  chest tube. No pneumothorax.         Admission Weight: Last Weight   Weight - Scale: 88.2 kg (194

## 2025-05-11 NOTE — PLAN OF CARE
Problem: Physical Therapy - Adult  Goal: By Discharge: Performs mobility at highest level of function for planned discharge setting.  See evaluation for individualized goals.  Description: FUNCTIONAL STATUS PRIOR TO ADMISSION: Patient was independent and active without use of DME, but does report history recent falls.      HOME SUPPORT PRIOR TO ADMISSION: The patient lived with family, but unclear level of s/a available.    Physical Therapy Goals  Initiated 5/9/2025  1.  Patient will move from supine to sit and sit to supine in bed with supervision/set-up within 5 day(s).    2.  Patient will perform sit to stand with supervision/set-up within 5 day(s).  3.  Patient will transfer from bed to chair and chair to bed with supervision/set-up using the least restrictive device within 5 day(s).  4.  Patient will ambulate with supervision/set-up for 150 feet with the least restrictive device within 5 day(s).   5.  Patient will perform cardiac exercises per protocol with supervision/set-up within 5 days.  6.  Patient will verbally recall and functionally demonstrate mindful-based movements (\"move in the tube\") principles without cues within 5 days.   Outcome: Progressing     PHYSICAL THERAPY TREATMENT    Patient: Ping Salazar (70 y.o. female)  Date: 5/11/2025  Diagnosis: CAD (coronary artery disease) [I25.10]  CAD in native artery [I25.10] CAD in native artery  Procedure(s) (LRB):  CORONARY ARTERY BYPASS GRAFTING (CABG) X 2 ; LIMA TO LAD; EVH OF BLE; ECC; JACOBY & EPIAORTIC U/S BY DR. GREEN ([facility]) (N/A) 3 Days Post-Op  Precautions: Restrictions/Precautions  Restrictions/Precautions: Fall Risk     Position Activity Restriction  Sternal Precautions: Move in the Tube      ASSESSMENT:  Patient continues to benefit from skilled PT services and is progressing towards goals. Pt tolerates standing and gait training from the chair, able to progress 84ft with noted drop in BP leading to sitting in the chair 70s/40s. Pt recovers

## 2025-05-11 NOTE — PROGRESS NOTES
2000- Report from [facility], patient vitals stable, care assumed.    Uneventful shift, patient remains off levo with MAPs>65    0800- Bedside and Verbal shift change report given to Blanca (oncoming nurse) by Jayla (offgoing nurse). Report included the following information Nurse Handoff Report, Intake/Output, MAR, Recent Results, Cardiac Rhythm SR, and Alarm Parameters.

## 2025-05-11 NOTE — PROGRESS NOTES
CRITICAL CARE NOTE      Name: Ping Salazar   : 1955   MRN: [id number]   Date: 2025      Reason for ICU Admission: s/p CABG    ICU PROBLEM LIST   Multivessel CAD s/p CABG x2  HFimpEF  Perioperative hypotension, vasoplegia  DM-2  HLD  Hx HTN  MDD    HISTORY OF PRESENT ILLNESS:   Patient is a 69yo F w/ PMH as noted above who presents to CVICU post planned CABG x2 (LIMA-LAD, RSVG-OM).     24 HOUR EVENTS:   No acute events overnight. No new issues at this time. Chest tubes removed. Ambulated & OOBC. Weaning O2. Remains off pressors and continuing to improve overall. Critical Care team to sign off.     NEUROLOGICAL:    Monitor mentation   As needed pain regimen  Home paxil     PULMONOLOGY:   Weaning O2 via NC for spO2 90-98%; extubated POD 0  As needed nebs  IS, PFV, out of bed to chair as tolerated      CARDIOVASCULAR:   Wean levophed, s/p ailyn   S/p IVFs  Goal MAP greater than 65, SBP less than 130, CI >2, CO >3.5  Pacer wires and Dominic drain management per CT surgery   Telemetry     GASTROINTESTINAL:   Pepcid  ADAT      RENAL/ELECTROLYTE/FLUIDS:   Strict ins and outs  Daily renal panel  Monitor and replace electrolytes     ENDOCRINE:   Insulin drip per protocol  Glucose goal 120-180     HEMATOLOGY/ONCOLOGY:   SCDs  Chemical prophylaxis as cleared by surgery     ID/MICRO:      Perioperative Ancef      ICU DAILY CHECKLIST      Code Status: Full  DVT Prophylaxis: SCDs  T/L/D: CVC, [place]   SUP: Pepcid  Diet: ADA T  Activity Level: OOBC, PT OT  ABCDEF Bundle/Checklist Completed:Yes  Disposition: Critical Care team to sign off  Multidisciplinary Rounds Completed:  Yes  Patient/Family Updated: Yes       Review of Systems:     Negative except as noted above    OBJECTIVE:     Labs and Data: Reviewed 25  Medications: Reviewed 25  Imaging: Reviewed 25    /60   Pulse 81   Temp 98.4 °F (36.9 °C) (Oral)   Resp 20   Ht 1.702 m (5' 7\")   Wt 94.2 kg (207 lb 10.8 oz)   SpO2 94%   BMI 32.53

## 2025-05-11 NOTE — PROGRESS NOTES
1130: Central line removed    1430: desouza removed    2000: Bedside shift change report given to Jayla RN (oncoming nurse) by Blanca RN (offgoing nurse). Report included the following information Nurse Handoff Report, Adult Overview, Surgery Report, Intake/Output, MAR, and Recent Results.

## 2025-05-12 ENCOUNTER — APPOINTMENT (OUTPATIENT)
Facility: HOSPITAL | Age: 70
DRG: 236 | End: 2025-05-12
Attending: THORACIC SURGERY (CARDIOTHORACIC VASCULAR SURGERY)
Payer: MEDICARE

## 2025-05-12 PROBLEM — R73.9 STRESS HYPERGLYCEMIA: Status: ACTIVE | Noted: 2025-05-12

## 2025-05-12 LAB
ANION GAP SERPL CALC-SCNC: 6 MMOL/L (ref 2–12)
BUN SERPL-MCNC: 20 MG/DL (ref 6–20)
BUN/CREAT SERPL: 38 (ref 12–20)
CALCIUM SERPL-MCNC: 8.6 MG/DL (ref 8.5–10.1)
CHLORIDE SERPL-SCNC: 104 MMOL/L (ref 97–108)
CO2 SERPL-SCNC: 26 MMOL/L (ref 21–32)
CREAT SERPL-MCNC: 0.53 MG/DL (ref 0.55–1.02)
ERYTHROCYTE [DISTWIDTH] IN BLOOD BY AUTOMATED COUNT: 13.1 % (ref 11.5–14.5)
GLUCOSE BLD STRIP.AUTO-MCNC: 219 MG/DL (ref 65–117)
GLUCOSE BLD STRIP.AUTO-MCNC: 240 MG/DL (ref 65–117)
GLUCOSE BLD STRIP.AUTO-MCNC: 249 MG/DL (ref 65–117)
GLUCOSE BLD STRIP.AUTO-MCNC: 258 MG/DL (ref 65–117)
GLUCOSE SERPL-MCNC: 210 MG/DL (ref 65–100)
HCT VFR BLD AUTO: 26.1 % (ref 35–47)
HGB BLD-MCNC: 8.6 G/DL (ref 11.5–16)
MAGNESIUM SERPL-MCNC: 2.2 MG/DL (ref 1.6–2.4)
MCH RBC QN AUTO: 31.2 PG (ref 26–34)
MCHC RBC AUTO-ENTMCNC: 33 G/DL (ref 30–36.5)
MCV RBC AUTO: 94.6 FL (ref 80–99)
NRBC # BLD: 0.02 K/UL (ref 0–0.01)
NRBC BLD-RTO: 0.3 PER 100 WBC
PLATELET # BLD AUTO: 178 K/UL (ref 150–400)
PMV BLD AUTO: 10.2 FL (ref 8.9–12.9)
POTASSIUM SERPL-SCNC: 4.2 MMOL/L (ref 3.5–5.1)
RBC # BLD AUTO: 2.76 M/UL (ref 3.8–5.2)
SERVICE CMNT-IMP: ABNORMAL
SODIUM SERPL-SCNC: 136 MMOL/L (ref 136–145)
WBC # BLD AUTO: 7.9 K/UL (ref 3.6–11)

## 2025-05-12 PROCEDURE — 6370000000 HC RX 637 (ALT 250 FOR IP): Performed by: NURSE PRACTITIONER

## 2025-05-12 PROCEDURE — 2500000003 HC RX 250 WO HCPCS: Performed by: PHYSICIAN ASSISTANT

## 2025-05-12 PROCEDURE — 99231 SBSQ HOSP IP/OBS SF/LOW 25: CPT | Performed by: CLINICAL NURSE SPECIALIST

## 2025-05-12 PROCEDURE — 2580000003 HC RX 258: Performed by: NURSE PRACTITIONER

## 2025-05-12 PROCEDURE — 71045 X-RAY EXAM CHEST 1 VIEW: CPT

## 2025-05-12 PROCEDURE — 83735 ASSAY OF MAGNESIUM: CPT

## 2025-05-12 PROCEDURE — 6360000002 HC RX W HCPCS: Performed by: NURSE PRACTITIONER

## 2025-05-12 PROCEDURE — 85027 COMPLETE CBC AUTOMATED: CPT

## 2025-05-12 PROCEDURE — 2060000000 HC ICU INTERMEDIATE R&B

## 2025-05-12 PROCEDURE — 82962 GLUCOSE BLOOD TEST: CPT

## 2025-05-12 PROCEDURE — 97535 SELF CARE MNGMENT TRAINING: CPT

## 2025-05-12 PROCEDURE — 6370000000 HC RX 637 (ALT 250 FOR IP): Performed by: PHYSICIAN ASSISTANT

## 2025-05-12 PROCEDURE — 80048 BASIC METABOLIC PNL TOTAL CA: CPT

## 2025-05-12 PROCEDURE — 2500000003 HC RX 250 WO HCPCS: Performed by: ANESTHESIOLOGY

## 2025-05-12 PROCEDURE — 6370000000 HC RX 637 (ALT 250 FOR IP): Performed by: CLINICAL NURSE SPECIALIST

## 2025-05-12 RX ORDER — METHOCARBAMOL 500 MG/1
500 TABLET, FILM COATED ORAL 3 TIMES DAILY PRN
Status: DISCONTINUED | OUTPATIENT
Start: 2025-05-12 | End: 2025-05-14 | Stop reason: HOSPADM

## 2025-05-12 RX ORDER — FUROSEMIDE 10 MG/ML
20 INJECTION INTRAMUSCULAR; INTRAVENOUS 2 TIMES DAILY
Status: DISCONTINUED | OUTPATIENT
Start: 2025-05-12 | End: 2025-05-13

## 2025-05-12 RX ORDER — INSULIN LISPRO 100 [IU]/ML
5 INJECTION, SOLUTION INTRAVENOUS; SUBCUTANEOUS
Status: DISCONTINUED | OUTPATIENT
Start: 2025-05-12 | End: 2025-05-13

## 2025-05-12 RX ORDER — LACTULOSE 10 G/15ML
20 SOLUTION ORAL 3 TIMES DAILY
Status: DISCONTINUED | OUTPATIENT
Start: 2025-05-12 | End: 2025-05-14

## 2025-05-12 RX ORDER — METOPROLOL SUCCINATE 25 MG/1
12.5 TABLET, EXTENDED RELEASE ORAL NIGHTLY
Status: DISCONTINUED | OUTPATIENT
Start: 2025-05-12 | End: 2025-05-14

## 2025-05-12 RX ORDER — INSULIN GLARGINE 100 [IU]/ML
25 INJECTION, SOLUTION SUBCUTANEOUS DAILY
Status: DISCONTINUED | OUTPATIENT
Start: 2025-05-12 | End: 2025-05-14

## 2025-05-12 RX ADMIN — FAMOTIDINE 20 MG: 20 TABLET, FILM COATED ORAL at 08:07

## 2025-05-12 RX ADMIN — ACETAMINOPHEN 1000 MG: 500 TABLET ORAL at 11:34

## 2025-05-12 RX ADMIN — OXYCODONE 5 MG: 5 TABLET ORAL at 11:33

## 2025-05-12 RX ADMIN — SODIUM CHLORIDE, PRESERVATIVE FREE 10 ML: 5 INJECTION INTRAVENOUS at 08:21

## 2025-05-12 RX ADMIN — INSULIN LISPRO 4 UNITS: 100 INJECTION, SOLUTION INTRAVENOUS; SUBCUTANEOUS at 17:30

## 2025-05-12 RX ADMIN — LACTULOSE 20 G: 20 SOLUTION ORAL at 12:55

## 2025-05-12 RX ADMIN — Medication 400 MG: at 21:10

## 2025-05-12 RX ADMIN — INSULIN LISPRO 5 UNITS: 100 INJECTION, SOLUTION INTRAVENOUS; SUBCUTANEOUS at 12:44

## 2025-05-12 RX ADMIN — SODIUM CHLORIDE, PRESERVATIVE FREE 10 ML: 5 INJECTION INTRAVENOUS at 20:52

## 2025-05-12 RX ADMIN — MUPIROCIN: 20 OINTMENT TOPICAL at 08:18

## 2025-05-12 RX ADMIN — INSULIN LISPRO 5 UNITS: 100 INJECTION, SOLUTION INTRAVENOUS; SUBCUTANEOUS at 17:30

## 2025-05-12 RX ADMIN — AMIODARONE HYDROCHLORIDE 400 MG: 200 TABLET ORAL at 08:07

## 2025-05-12 RX ADMIN — CHLORHEXIDINE GLUCONATE 15 ML: 1.2 RINSE ORAL at 20:52

## 2025-05-12 RX ADMIN — MUPIROCIN: 20 OINTMENT TOPICAL at 20:52

## 2025-05-12 RX ADMIN — ATORVASTATIN CALCIUM 80 MG: 40 TABLET, FILM COATED ORAL at 21:10

## 2025-05-12 RX ADMIN — CHLORHEXIDINE GLUCONATE 15 ML: 1.2 RINSE ORAL at 08:18

## 2025-05-12 RX ADMIN — IRON SUCROSE 200 MG: 20 INJECTION, SOLUTION INTRAVENOUS at 11:06

## 2025-05-12 RX ADMIN — SENNOSIDES AND DOCUSATE SODIUM 1 TABLET: 50; 8.6 TABLET ORAL at 21:10

## 2025-05-12 RX ADMIN — SENNOSIDES AND DOCUSATE SODIUM 1 TABLET: 50; 8.6 TABLET ORAL at 08:07

## 2025-05-12 RX ADMIN — OXYCODONE 5 MG: 5 TABLET ORAL at 05:18

## 2025-05-12 RX ADMIN — PAROXETINE HYDROCHLORIDE 60 MG: 20 TABLET, FILM COATED ORAL at 08:06

## 2025-05-12 RX ADMIN — CLOPIDOGREL BISULFATE 75 MG: 75 TABLET ORAL at 08:07

## 2025-05-12 RX ADMIN — ASPIRIN 81 MG: 81 TABLET, COATED ORAL at 08:07

## 2025-05-12 RX ADMIN — OXYCODONE 5 MG: 5 TABLET ORAL at 18:15

## 2025-05-12 RX ADMIN — FUROSEMIDE 20 MG: 10 INJECTION, SOLUTION INTRAMUSCULAR; INTRAVENOUS at 15:10

## 2025-05-12 RX ADMIN — ACETAMINOPHEN 1000 MG: 500 TABLET ORAL at 17:33

## 2025-05-12 RX ADMIN — POLYETHYLENE GLYCOL 3350 17 G: 17 POWDER, FOR SOLUTION ORAL at 08:07

## 2025-05-12 RX ADMIN — AMIODARONE HYDROCHLORIDE 400 MG: 200 TABLET ORAL at 21:10

## 2025-05-12 RX ADMIN — FAMOTIDINE 20 MG: 20 TABLET, FILM COATED ORAL at 21:10

## 2025-05-12 RX ADMIN — INSULIN LISPRO 2 UNITS: 100 INJECTION, SOLUTION INTRAVENOUS; SUBCUTANEOUS at 21:12

## 2025-05-12 RX ADMIN — ACETAMINOPHEN 1000 MG: 500 TABLET ORAL at 05:19

## 2025-05-12 RX ADMIN — Medication 400 MG: at 08:07

## 2025-05-12 RX ADMIN — FUROSEMIDE 20 MG: 10 INJECTION, SOLUTION INTRAMUSCULAR; INTRAVENOUS at 09:09

## 2025-05-12 RX ADMIN — INSULIN LISPRO 5 UNITS: 100 INJECTION, SOLUTION INTRAVENOUS; SUBCUTANEOUS at 09:01

## 2025-05-12 RX ADMIN — INSULIN LISPRO 6 UNITS: 100 INJECTION, SOLUTION INTRAVENOUS; SUBCUTANEOUS at 12:44

## 2025-05-12 RX ADMIN — INSULIN GLARGINE 25 UNITS: 100 INJECTION, SOLUTION SUBCUTANEOUS at 09:01

## 2025-05-12 ASSESSMENT — PAIN DESCRIPTION - ONSET
ONSET: ON-GOING
ONSET: ON-GOING

## 2025-05-12 ASSESSMENT — PAIN SCALES - GENERAL
PAINLEVEL_OUTOF10: 3
PAINLEVEL_OUTOF10: 1
PAINLEVEL_OUTOF10: 4
PAINLEVEL_OUTOF10: 4
PAINLEVEL_OUTOF10: 0
PAINLEVEL_OUTOF10: 6
PAINLEVEL_OUTOF10: 3
PAINLEVEL_OUTOF10: 2

## 2025-05-12 ASSESSMENT — PAIN DESCRIPTION - PAIN TYPE
TYPE: SURGICAL PAIN
TYPE: SURGICAL PAIN

## 2025-05-12 ASSESSMENT — PAIN DESCRIPTION - LOCATION
LOCATION: CHEST

## 2025-05-12 ASSESSMENT — PAIN DESCRIPTION - FREQUENCY
FREQUENCY: INTERMITTENT
FREQUENCY: INTERMITTENT

## 2025-05-12 ASSESSMENT — PAIN DESCRIPTION - ORIENTATION
ORIENTATION: ANTERIOR;MID
ORIENTATION: ANTERIOR;MID;LEFT

## 2025-05-12 ASSESSMENT — PAIN DESCRIPTION - DESCRIPTORS
DESCRIPTORS: ACHING
DESCRIPTORS: ACHING

## 2025-05-12 NOTE — PROGRESS NOTES
I participated in Interdisciplinary Rounds on the CVICU unit where patient care was discussed.    The Interdisciplinary team is aware of  availability and were encouraged to request Spiritual Health support as needed.      The  on-call can be reached at [phone]).     Rev. Red Bingham MDiv  Chaplain Resident

## 2025-05-12 NOTE — DIABETES MGMT
[facility]  PROGRAM FOR DIABETES HEALTH  DIABETES MANAGEMENT CONSULT    Evaluation and Action Plan   Ping Salazar is a 70 y.o. with uncontrolled T2D with neuropathy, HFimpEF, and TIA (2023).  Admitted to [facility] 5/7 prior to CABG on 5/8 to initiate insulin infusion for A1C 9%.  S/p cardiac cath 4/16/25 as outpatient which showed severe diffuse multi-vessel CAD with normal lvedp.  Diabetes mgmt consulted by cardiac surgery team for advanced nursing diabetes evaluation, inpatient BG mgmt while patient is receiving insulin infusion both pre and post op.      Patient reports living with diabetes since in her 40's.  Verified she is prescribed Jardiance 10mg and Metformin 1000 mg BID by her cardiologist.  She is not established with PCP.  She endorses she was \"just started\" on the Jardiance recently.  She admits to not taking her Metformin like she should and on average usually takes one pill per day and does miss 1 full day of metformin in a week's time.  She was not monitoring her BG PTA.  She was initially reluctant to disclose her diet practices but her son (at bedside) encouraged her to report her diet intake.  She usually eats a [employer] breakfast of biscuit and gravy with hash brown , does admit to eating out a lot and occasionally enjoys milkshakes or ice cream sandwiches. Counseled patient re: diet adjustments and modifying her portions of starches and sweets.  She was receptive to the initial education provided.  Also discussed her elevated A1C and likelihood of medication adjustments at discharge.       Plan: Insulin infusion initiated day before surgery due to elevated A1C.  Now post op day 1 on 5/9.  Patient noted to be sitting up in bedside chair today.  RN reported high needs from insulin infusion.  Past 24h needs 37.6 units.  Hourly rate overnight 1u/hr and higher at meals. Plant to transition off insulin 5/10 with daily basal insulin and bolus insulin as appropriate.  5/12: Transitioned off insulin  infusion over weekend.  Noted only one time dose of basal insulin given on day of transition.  BG trends after transition >200 despite corrective insulin ACHS.  Initiated daily basal insulin 5/12.  Discussed nutrition label reading with patient as she had some questions about what to focus on when reading a nutrition label.  Explained to patient to focus on serving size and total carbs per serving when choosing food items.  Discussed her daily carbohydrate intake should not exceed 150-180g /day (50-60g /meal).    Blood glucose pattern    Significant diabetes-related events over the past 24-72 hours  A1C 9.0% in March 2025  Insulin infusion pre op on 5/7, will transition off 5/10.   Fasting BG:>200  Pre-prandial: 249-261  Basal: initiated basal insulin 5/12  Bolus: initiated bolus insulin TID 5/12  Correction: ACHS, cardiac surgery corrective scale   Management Rationale Action Plan   Medication  START Lantus 25 units daily  START lispro 5 units TID with meals  Continue cardiac surgery corrective scale ACHS   Additional orders  Carb consistent diet (60g CHO/meal)       Diabetes Discharge Plan   Medication- A1C 8.9%.    Will likely RESUME Metformin and Jardiance per PTA dosing at discharge  Will need glucometer and supplies to monitor BG  Would recommend Glipizide or Glimepiride at discharge     Will follow along throughout admission    Referral  [x]        Outpatient diabetes education-will place referral closer to discharge   Additional orders  Needs to establish care with new PCP-           Initial Presentation     HX:   Past Medical History:   Diagnosis Date    Anxiety     Cerebral artery occlusion with cerebral infarction (HCC)     Depression     Diabetes (HCC)     Falls     Fatigue     Neuropathy     Psychiatric disorder     depression    Snoring     Visual disturbance         INITIAL DX: CAD (coronary artery disease) [I25.10]  CAD in native artery [I25.10]     Current Treatment     TX: insulin

## 2025-05-12 NOTE — PROGRESS NOTES
Physical Therapy 5/12/25    Patient currently on bed rest x 1 hour due to A wires being pulled. Will defer and follow up later as able.    Thank you for your consideration,    Mickie Narayan, PT, DPT

## 2025-05-12 NOTE — CARDIO/PULMONARY
Chart reviewed: Patient is 70 y.o. female admitted with CAD (coronary artery disease) [I25.10]  CAD in native artery [I25.10]    Education: ERAS education is not currently  at bedside.  Met with Ping Salazar to begin cardiac surgery post discharge instructions and to discuss participation in the Cardiac Rehab Program.     Educated using teach back method. Reviewed the use of bear for sternal support, daily weight and temperature monitoring, and use of incentive spirometer.   Ping Salazar was able to demonstrate proper use of incentive spirometer, achieving 1350 ml.      Discussed Cardiac Rehab Program format, benefits, and encouraged participation. A referral for the OP program at [facility]  was entered and contact information  is on her AVS.General questions answered. Ping Salazar verbalized understanding.        Johana Caceres RN

## 2025-05-12 NOTE — PROGRESS NOTES
1015: Epicardial wires pulled.    1945: Bedside shift change report given to Alice RN (oncoming nurse) by Blanca RN (offgoing nurse). Report included the following information Nurse Handoff Report, Adult Overview, Surgery Report, Intake/Output, MAR, and Recent Results.

## 2025-05-12 NOTE — PROGRESS NOTES
Spiritual Health History and Assessment/Progress Note  [facility]    Follow-up, Loneliness/Social Isolation,  ,  ,      Name: Ping Salazar MRN: [id number]    Age: 70 y.o.     Sex: female   Language: English   Sabianist: Oriental orthodox   CAD in native artery     Date: 5/12/2025            Total Time Calculated: 10 min              Spiritual Assessment continued in [facility] 4 CV INTNSV CARE        Referral/Consult From: Rounding   Encounter Overview/Reason: Follow-up, Loneliness/Social Isolation  Service Provided For: Patient    Tarsha, Belief, Meaning:   Patient has beliefs or practices that help with coping during difficult times  Family/Friends No family/friends present      Importance and Influence:  Patient has spiritual/personal beliefs that influence decisions regarding their health  Family/Friends No family/friends present    Community:  Patient feels well-supported. Support system includes: Children  Family/Friends No family/friends present    Assessment and Plan of Care:     Patient Interventions include: Facilitated expression of thoughts and feelings and Explored spiritual coping/struggle/distress  Family/Friends Interventions include: No family/friends present    Patient Plan of Care: Spiritual Care available upon further referral  Family/Friends Plan of Care: No family/friends present    Prior to rounding in CVICU, I reviewed Ping Salazar' chart, prioritizing a visit for her, as she is at risk for loneliness and social isolation. She said that she's looking forward to being discharged to go home and be with her dog, Jaky. She feels overall supported by her son - she also has a daughter Jenni who she used to live with - but her new living arrangement, according to her, is not ideal, as she lives with her son and ex-.     Goal: As the patient continues to recover from her recent surgery, Spiritual Health can continue to engage her regarding her home life, assessing if an appropriate referral is

## 2025-05-12 NOTE — PROCEDURES
PROCEDURE NOTE  Date: 5/12/2025   Name: Ping Salazar  YOB: 1955    Procedures      Patient has not used pacing wires in last 72 hours. HR stable after initiation of metoprolol. Patient A wires pulled, V wires cut per OR handoff. Skin cleaned with chlorohexidine swab, sutures cut. Tension applied to A wire and pulled through skin easily. V wires: pressure applied to pull wires taut and skin pushed down using scissors. Wires cut, skin released back over remaining wire ends. Patient tolerated well, hemodynamically stable at end of procedure. Bedrest for 1 hours with VS every 15 minutes x 4.     Maria Fernanda Schneider, [facility]-BC

## 2025-05-12 NOTE — PLAN OF CARE
Problem: Occupational Therapy - Adult  Goal: By Discharge: Performs self-care activities at highest level of function for planned discharge setting.  See evaluation for individualized goals.  Description: FUNCTIONAL STATUS PRIOR TO ADMISSION: Patient was overall indep for ADL, indep for functional mobility without device. Lived in a house with family members.Endorses extensive fall history, PMH also significant for multiple CVA workups (MRIs all negative from chart review, patient unable to elaborate during evaluation).      Home Equipment: None  Prior Level of Assist for ADLs: Independent  Prior Level of Assist for Homemaking: Independent     Prior Level of Assist for Transfers: Independent  Active : Yes     Occupational Therapy Goals:  Initiated 5/9/2025  1.  Patient will perform standing grooming with Contact Guard Assist within 7 day(s).  2.  Patient will perform LE dressing with Contact Guard Assist within 7 day(s).  3.  Patient will perform UE dressing with Contact Guard Assist within 7 day(s).  4.  Patient will perform toilet transfers with Minimal Assist  within 7 day(s).  5.  Patient will perform all aspects of toileting with Minimal Assist within 7 day(s).  6.  Patient will participate in upper extremity therapeutic exercise/activities with Minimal Assist for 10 minutes within 7 day(s).    7.  Patient will utilize energy conservation techniques during functional activities with verbal cues within 7 day(s).    Outcome: Progressing   OCCUPATIONAL THERAPY TREATMENT  Patient: Ping Salazar (70 y.o. female)  Date: 5/12/2025  Primary Diagnosis: CAD (coronary artery disease) [I25.10]  CAD in native artery [I25.10]  Procedure(s) (LRB):  CORONARY ARTERY BYPASS GRAFTING (CABG) X 2 ; LIMA TO LAD; EVH OF BLE; ECC; JACOBY & EPIAORTIC U/S BY DR. GREEN ([facility]) (N/A) 4 Days Post-Op   Precautions: Fall Risk           Sternal Precautions: Move in the Tube    Chart, occupational therapy assessment, plan of care,  time  Attention Span: Difficulty attending to directions  Memory: Decreased recall of recent events;Decreased short term memory;Decreased long term memory;Decreased recall of precautions  Safety Judgement: Decreased awareness of need for safety;Decreased awareness of need for assistance  Problem Solving: Assistance required to correct errors made;Assistance required to implement solutions  Insights: Decreased awareness of deficits  Initiation: Requires cues for some  Sequencing: Requires cues for some    Functional Mobility and Transfers for ADLs:  Bed Mobility:  Bed Mobility Training  Bed Mobility Training: Yes  Rolling: Supervision  Supine to Sit: Minimal assistance     Transfers:  Transfer Training  Transfer Training: Yes  Sit to Stand: Contact guard assistance  Stand to Sit: Contact guard assistance  Bed to Chair: Contact guard assistance      Balance:     Balance  Sitting: Intact  Standing: Impaired  Standing - Static: Good  Standing - Dynamic: Fair;Constant support      ADL Intervention:  Feeding: .  - Feeding Assistance : Independent            Upper Extremity Dressing: .  - Pull Over Shirt : Contact Guard Assistance and Seated in chair     Lower Extremity Dressing: .  - Underwear: Contact Guard Assistance and Seated in chair   - Socks : Contact Guard Assist and Seated in chair              Patient instructed and educated on mindful movement principles based on “Move in The Tube” maintaining bilateral elbows close to rib cage when performing any load-bearing activity.  Educated on applying this concept when getting in/out of bed, pushing up from a chair, opening a door, or lifting a box.  Patient has been provided handouts with diagrams of each correct/incorrect method of performing each of the above tasks.     Patient instructed on the ability to utilize upper extremities “outside the tube” when doing any non-load bearing activity such as washing hair/body, brushing teeth, retrieving clothing items, or

## 2025-05-12 NOTE — PROGRESS NOTES
2000- Report from Blanca, patient vitals stable; care assumed.    Uneventful night    0800- Bedside and Verbal shift change report given to RN (oncoming nurse) by Jayla (offgoing nurse). Report included the following information Nurse Handoff Report, Intake/Output, MAR, Recent Results, Cardiac Rhythm SR, and Alarm Parameters.

## 2025-05-12 NOTE — PROGRESS NOTES
1930: Bedside and Verbal shift change report given to LANEY Jenkins (oncoming nurse) by LANEY Rios (offgoing nurse). Report included the following information Nurse Handoff Report, Intake/Output, MAR, Recent Results, and Cardiac Rhythm NSR .      2245: BB held- SBP 90s    0730: Bedside and Verbal shift change report given to LANEY Jenkins/ LANEY Kennedy (oncoming nurse) by LANEY Jenkins (offgoing nurse). Report included the following information Nurse Handoff Report, Intake/Output, MAR, Recent Results, and Cardiac Rhythm NSR .

## 2025-05-12 NOTE — PROGRESS NOTES
Cardiac Surgery Care Coordinator- Met with Ping Salazar reviewed plan of care and discussed upcoming discharge date. Reinforced sternal precautions and encouraged continued use of the incentive spirometer. Began discharge teaching and encouraged her to verbalize. Reviewed goals for the day and discussed the importance of increased activity and continued activity after discharge. Provided her with the red reminder bracelet. Reviewed importance of wearing the red reminder bracelet and when to call MD. Will continue to follow for educational and emotional needs.

## 2025-05-12 NOTE — PROGRESS NOTES
Cardiac Surgery ICU Progress Note    Admit Date: 2025  POD:  4 Days Post-Op    Procedure:    25 with Dr. Longoria:   CORONARY ARTERY BYPASS GRAFTING (CABG) X 2 ; (LIMA TO LAD, SVG-OM) ; EVH OF BLE; ECC; JACOBY & EPIAORTIC U/S BY DR. GREEN ([facility])       [facility] Synopsis:  : Direct admit for insulin infusion, A1c 8.9.    DOS: Elective CABG with Dr. Longoria. Intra-op JACOBY with normal biventricular function, EF 55%. Transferred to CVICU on ailyn, precedex, and insulin with AV wires and 3 filiberto drains (2 med, L pleural). Extubated at 1455.  Norepi added. 1L LR and Albumin x 1 given.    POD 1: Vaso added this morning, CVP low, additional fluid resuscitation ordered. PT/OT evaluated, during discussion disclosed multiple falls  5/10 POD2: chest tubes removed. 2LNC Levophed 5 mc, SR.    POD3:  Norepi off this AM, start low dose BB. SR. RA. Transfer to step down   POD 4: NAEO. A wires pulled, V cut. BP borderline, decrease BB. Lasix 20mg IV BID. No bed in stepdown yet.      Subjective:   Patient seen with Dr. Murillo, up in chair, afebrile, SR 70s, 2L NC. She denies any acute concerns today.       Objective:   Vitals:  Blood pressure 124/69, pulse 78, temperature 98.3 °F (36.8 °C), temperature source Oral, resp. rate 14, height 1.702 m (5' 7\"), weight 94.9 kg (209 lb 3.5 oz), SpO2 94%, not currently breastfeeding.  Temp (24hrs), Av.3 °F (36.8 °C), Min:97.7 °F (36.5 °C), Max:99.3 °F (37.4 °C)      Oxygen Flow Rate: O2 Flow Rate (L/min): 2 L/min    Oxygen Delivery Method: O2 Device: None (Room air)    EKG/Rhythm:  SR 70s      Extubation Date / Time:  at 1455    CXR:  Xray Result (most recent):  XR CHEST PORTABLE 2025    Narrative  INDICATION:  Post op open heart surgery    EXAM: Portable chest 0414 . Comparison prior day.    FINDINGS: Interstitial prominence continues to improve with minimal basilar  atelectasis.. Cardiomediastinal silhouette is unchanged. No pneumothorax. Post  removal of the

## 2025-05-13 ENCOUNTER — APPOINTMENT (OUTPATIENT)
Facility: HOSPITAL | Age: 70
DRG: 236 | End: 2025-05-13
Attending: THORACIC SURGERY (CARDIOTHORACIC VASCULAR SURGERY)
Payer: MEDICARE

## 2025-05-13 LAB
ANION GAP SERPL CALC-SCNC: 5 MMOL/L (ref 2–12)
BASOPHILS # BLD: 0.02 K/UL (ref 0–0.1)
BASOPHILS NFR BLD: 0.3 % (ref 0–1)
BUN SERPL-MCNC: 16 MG/DL (ref 6–20)
BUN/CREAT SERPL: 28 (ref 12–20)
CALCIUM SERPL-MCNC: 8.1 MG/DL (ref 8.5–10.1)
CHLORIDE SERPL-SCNC: 108 MMOL/L (ref 97–108)
CO2 SERPL-SCNC: 27 MMOL/L (ref 21–32)
CREAT SERPL-MCNC: 0.57 MG/DL (ref 0.55–1.02)
DIFFERENTIAL METHOD BLD: ABNORMAL
EOSINOPHIL # BLD: 0.2 K/UL (ref 0–0.4)
EOSINOPHIL NFR BLD: 2.9 % (ref 0–7)
ERYTHROCYTE [DISTWIDTH] IN BLOOD BY AUTOMATED COUNT: 13.2 % (ref 11.5–14.5)
GLUCOSE BLD STRIP.AUTO-MCNC: 144 MG/DL (ref 65–117)
GLUCOSE BLD STRIP.AUTO-MCNC: 159 MG/DL (ref 65–117)
GLUCOSE BLD STRIP.AUTO-MCNC: 208 MG/DL (ref 65–117)
GLUCOSE BLD STRIP.AUTO-MCNC: 244 MG/DL (ref 65–117)
GLUCOSE SERPL-MCNC: 159 MG/DL (ref 65–100)
HCT VFR BLD AUTO: 24.1 % (ref 35–47)
HGB BLD-MCNC: 7.9 G/DL (ref 11.5–16)
IMM GRANULOCYTES # BLD AUTO: 0.05 K/UL (ref 0–0.04)
IMM GRANULOCYTES NFR BLD AUTO: 0.7 % (ref 0–0.5)
LYMPHOCYTES # BLD: 1.06 K/UL (ref 0.8–3.5)
LYMPHOCYTES NFR BLD: 15.6 % (ref 12–49)
MAGNESIUM SERPL-MCNC: 2 MG/DL (ref 1.6–2.4)
MCH RBC QN AUTO: 30.9 PG (ref 26–34)
MCHC RBC AUTO-ENTMCNC: 32.8 G/DL (ref 30–36.5)
MCV RBC AUTO: 94.1 FL (ref 80–99)
MONOCYTES # BLD: 0.74 K/UL (ref 0–1)
MONOCYTES NFR BLD: 10.9 % (ref 5–13)
NEUTS SEG # BLD: 4.71 K/UL (ref 1.8–8)
NEUTS SEG NFR BLD: 69.6 % (ref 32–75)
NRBC # BLD: 0.08 K/UL (ref 0–0.01)
NRBC BLD-RTO: 1.2 PER 100 WBC
PLATELET # BLD AUTO: 198 K/UL (ref 150–400)
PMV BLD AUTO: 10 FL (ref 8.9–12.9)
POTASSIUM SERPL-SCNC: 4 MMOL/L (ref 3.5–5.1)
RBC # BLD AUTO: 2.56 M/UL (ref 3.8–5.2)
SERVICE CMNT-IMP: ABNORMAL
SODIUM SERPL-SCNC: 140 MMOL/L (ref 136–145)
WBC # BLD AUTO: 6.8 K/UL (ref 3.6–11)

## 2025-05-13 PROCEDURE — 6370000000 HC RX 637 (ALT 250 FOR IP): Performed by: NURSE PRACTITIONER

## 2025-05-13 PROCEDURE — 2500000003 HC RX 250 WO HCPCS: Performed by: PHYSICIAN ASSISTANT

## 2025-05-13 PROCEDURE — 85025 COMPLETE CBC W/AUTO DIFF WBC: CPT

## 2025-05-13 PROCEDURE — 97116 GAIT TRAINING THERAPY: CPT

## 2025-05-13 PROCEDURE — 71046 X-RAY EXAM CHEST 2 VIEWS: CPT

## 2025-05-13 PROCEDURE — 97530 THERAPEUTIC ACTIVITIES: CPT

## 2025-05-13 PROCEDURE — 2500000003 HC RX 250 WO HCPCS: Performed by: ANESTHESIOLOGY

## 2025-05-13 PROCEDURE — 6370000000 HC RX 637 (ALT 250 FOR IP): Performed by: CLINICAL NURSE SPECIALIST

## 2025-05-13 PROCEDURE — 80048 BASIC METABOLIC PNL TOTAL CA: CPT

## 2025-05-13 PROCEDURE — 97110 THERAPEUTIC EXERCISES: CPT

## 2025-05-13 PROCEDURE — 97535 SELF CARE MNGMENT TRAINING: CPT

## 2025-05-13 PROCEDURE — 6360000002 HC RX W HCPCS: Performed by: NURSE PRACTITIONER

## 2025-05-13 PROCEDURE — 6360000002 HC RX W HCPCS: Performed by: PHYSICIAN ASSISTANT

## 2025-05-13 PROCEDURE — 2060000000 HC ICU INTERMEDIATE R&B

## 2025-05-13 PROCEDURE — P9047 ALBUMIN (HUMAN), 25%, 50ML: HCPCS | Performed by: NURSE PRACTITIONER

## 2025-05-13 PROCEDURE — 82962 GLUCOSE BLOOD TEST: CPT

## 2025-05-13 PROCEDURE — 83735 ASSAY OF MAGNESIUM: CPT

## 2025-05-13 PROCEDURE — 6370000000 HC RX 637 (ALT 250 FOR IP): Performed by: PHYSICIAN ASSISTANT

## 2025-05-13 RX ORDER — ALBUMIN (HUMAN) 12.5 G/50ML
12.5 SOLUTION INTRAVENOUS EVERY 6 HOURS
Status: COMPLETED | OUTPATIENT
Start: 2025-05-13 | End: 2025-05-14

## 2025-05-13 RX ORDER — FUROSEMIDE 10 MG/ML
40 INJECTION INTRAMUSCULAR; INTRAVENOUS 2 TIMES DAILY
Status: DISCONTINUED | OUTPATIENT
Start: 2025-05-13 | End: 2025-05-14

## 2025-05-13 RX ORDER — ALCOHOL ANTISEPTIC PADS
PADS, MEDICATED (EA) TOPICAL
Qty: 100 EACH | Refills: 2 | Status: SHIPPED | OUTPATIENT
Start: 2025-05-13

## 2025-05-13 RX ORDER — POTASSIUM CHLORIDE 750 MG/1
40 TABLET, EXTENDED RELEASE ORAL ONCE
Status: COMPLETED | OUTPATIENT
Start: 2025-05-13 | End: 2025-05-13

## 2025-05-13 RX ORDER — GLIMEPIRIDE 2 MG/1
2 TABLET ORAL
Qty: 30 TABLET | Refills: 2 | Status: SHIPPED | OUTPATIENT
Start: 2025-05-13

## 2025-05-13 RX ORDER — DIPHENHYDRAMINE HYDROCHLORIDE 25 MG/1
CAPSULE, LIQUID FILLED ORAL
Qty: 1 KIT | Refills: 0 | Status: SHIPPED | OUTPATIENT
Start: 2025-05-13

## 2025-05-13 RX ORDER — GLUCOSAMINE HCL/CHONDROITIN SU 500-400 MG
CAPSULE ORAL 2 TIMES DAILY
Qty: 100 STRIP | Refills: 2 | Status: SHIPPED | OUTPATIENT
Start: 2025-05-13 | End: 2025-06-12

## 2025-05-13 RX ORDER — FUROSEMIDE 10 MG/ML
20 INJECTION INTRAMUSCULAR; INTRAVENOUS ONCE
Status: COMPLETED | OUTPATIENT
Start: 2025-05-13 | End: 2025-05-13

## 2025-05-13 RX ORDER — ALBUMIN (HUMAN) 12.5 G/50ML
12.5 SOLUTION INTRAVENOUS ONCE
Status: DISCONTINUED | OUTPATIENT
Start: 2025-05-13 | End: 2025-05-13

## 2025-05-13 RX ORDER — INSULIN LISPRO 100 [IU]/ML
10 INJECTION, SOLUTION INTRAVENOUS; SUBCUTANEOUS ONCE
Status: COMPLETED | OUTPATIENT
Start: 2025-05-13 | End: 2025-05-13

## 2025-05-13 RX ORDER — INSULIN LISPRO 100 [IU]/ML
10 INJECTION, SOLUTION INTRAVENOUS; SUBCUTANEOUS
Status: DISCONTINUED | OUTPATIENT
Start: 2025-05-13 | End: 2025-05-14 | Stop reason: HOSPADM

## 2025-05-13 RX ADMIN — ALBUMIN (HUMAN) 12.5 G: 0.25 INJECTION, SOLUTION INTRAVENOUS at 09:26

## 2025-05-13 RX ADMIN — INSULIN GLARGINE 25 UNITS: 100 INJECTION, SOLUTION SUBCUTANEOUS at 08:44

## 2025-05-13 RX ADMIN — SODIUM CHLORIDE, PRESERVATIVE FREE 10 ML: 5 INJECTION INTRAVENOUS at 21:01

## 2025-05-13 RX ADMIN — CHLORHEXIDINE GLUCONATE 15 ML: 1.2 RINSE ORAL at 21:01

## 2025-05-13 RX ADMIN — CLOPIDOGREL BISULFATE 75 MG: 75 TABLET ORAL at 08:39

## 2025-05-13 RX ADMIN — AMIODARONE HYDROCHLORIDE 400 MG: 200 TABLET ORAL at 20:56

## 2025-05-13 RX ADMIN — ALBUMIN (HUMAN) 12.5 G: 0.25 INJECTION, SOLUTION INTRAVENOUS at 15:35

## 2025-05-13 RX ADMIN — FUROSEMIDE 40 MG: 10 INJECTION, SOLUTION INTRAMUSCULAR; INTRAVENOUS at 13:14

## 2025-05-13 RX ADMIN — SODIUM CHLORIDE, PRESERVATIVE FREE 10 ML: 5 INJECTION INTRAVENOUS at 08:40

## 2025-05-13 RX ADMIN — INSULIN LISPRO 2 UNITS: 100 INJECTION, SOLUTION INTRAVENOUS; SUBCUTANEOUS at 21:20

## 2025-05-13 RX ADMIN — INSULIN LISPRO 2 UNITS: 100 INJECTION, SOLUTION INTRAVENOUS; SUBCUTANEOUS at 13:11

## 2025-05-13 RX ADMIN — ATORVASTATIN CALCIUM 80 MG: 40 TABLET, FILM COATED ORAL at 20:56

## 2025-05-13 RX ADMIN — ALBUMIN (HUMAN) 12.5 G: 0.25 INJECTION, SOLUTION INTRAVENOUS at 21:15

## 2025-05-13 RX ADMIN — ACETAMINOPHEN 1000 MG: 500 TABLET ORAL at 07:22

## 2025-05-13 RX ADMIN — CHLORHEXIDINE GLUCONATE 15 ML: 1.2 RINSE ORAL at 08:45

## 2025-05-13 RX ADMIN — INSULIN LISPRO 10 UNITS: 100 INJECTION, SOLUTION INTRAVENOUS; SUBCUTANEOUS at 13:12

## 2025-05-13 RX ADMIN — FUROSEMIDE 20 MG: 10 INJECTION, SOLUTION INTRAMUSCULAR; INTRAVENOUS at 08:44

## 2025-05-13 RX ADMIN — ACETAMINOPHEN 1000 MG: 500 TABLET ORAL at 19:06

## 2025-05-13 RX ADMIN — Medication 400 MG: at 20:56

## 2025-05-13 RX ADMIN — PAROXETINE HYDROCHLORIDE 60 MG: 20 TABLET, FILM COATED ORAL at 08:39

## 2025-05-13 RX ADMIN — Medication 400 MG: at 08:37

## 2025-05-13 RX ADMIN — INSULIN LISPRO 10 UNITS: 100 INJECTION, SOLUTION INTRAVENOUS; SUBCUTANEOUS at 09:17

## 2025-05-13 RX ADMIN — INSULIN LISPRO 2 UNITS: 100 INJECTION, SOLUTION INTRAVENOUS; SUBCUTANEOUS at 19:06

## 2025-05-13 RX ADMIN — FUROSEMIDE 20 MG: 10 INJECTION, SOLUTION INTRAMUSCULAR; INTRAVENOUS at 09:17

## 2025-05-13 RX ADMIN — AMIODARONE HYDROCHLORIDE 400 MG: 200 TABLET ORAL at 08:39

## 2025-05-13 RX ADMIN — INSULIN LISPRO 4 UNITS: 100 INJECTION, SOLUTION INTRAVENOUS; SUBCUTANEOUS at 09:17

## 2025-05-13 RX ADMIN — ASPIRIN 81 MG: 81 TABLET, COATED ORAL at 08:37

## 2025-05-13 RX ADMIN — ACETAMINOPHEN 1000 MG: 500 TABLET ORAL at 23:15

## 2025-05-13 RX ADMIN — POTASSIUM CHLORIDE 40 MEQ: 750 TABLET, FILM COATED, EXTENDED RELEASE ORAL at 10:32

## 2025-05-13 RX ADMIN — ONDANSETRON 4 MG: 2 INJECTION, SOLUTION INTRAMUSCULAR; INTRAVENOUS at 04:17

## 2025-05-13 ASSESSMENT — PAIN DESCRIPTION - ORIENTATION: ORIENTATION: POSTERIOR

## 2025-05-13 ASSESSMENT — PAIN SCALES - GENERAL
PAINLEVEL_OUTOF10: 3
PAINLEVEL_OUTOF10: 0
PAINLEVEL_OUTOF10: 0

## 2025-05-13 ASSESSMENT — PAIN DESCRIPTION - LOCATION: LOCATION: NECK

## 2025-05-13 ASSESSMENT — PAIN DESCRIPTION - DESCRIPTORS: DESCRIPTORS: ACHING

## 2025-05-13 NOTE — PLAN OF CARE
Problem: Occupational Therapy - Adult  Goal: By Discharge: Performs self-care activities at highest level of function for planned discharge setting.  See evaluation for individualized goals.  Description: FUNCTIONAL STATUS PRIOR TO ADMISSION: Patient was overall indep for ADL, indep for functional mobility without device. Lived in a house with family members.Endorses extensive fall history, PMH also significant for multiple CVA workups (MRIs all negative from chart review, patient unable to elaborate during evaluation).      Home Equipment: None  Prior Level of Assist for ADLs: Independent  Prior Level of Assist for Homemaking: Independent     Prior Level of Assist for Transfers: Independent  Active : Yes     Occupational Therapy Goals:  Initiated 5/9/2025  1.  Patient will perform standing grooming with Contact Guard Assist within 7 day(s).  2.  Patient will perform LE dressing with Contact Guard Assist within 7 day(s).  3.  Patient will perform UE dressing with Contact Guard Assist within 7 day(s).  4.  Patient will perform toilet transfers with Minimal Assist  within 7 day(s).  5.  Patient will perform all aspects of toileting with Minimal Assist within 7 day(s).  6.  Patient will participate in upper extremity therapeutic exercise/activities with Minimal Assist for 10 minutes within 7 day(s).    7.  Patient will utilize energy conservation techniques during functional activities with verbal cues within 7 day(s).    Outcome: Progressing   OCCUPATIONAL THERAPY TREATMENT  Patient: Ping Salazar (70 y.o. female)  Date: 5/13/2025  Primary Diagnosis: CAD (coronary artery disease) [I25.10]  CAD in native artery [I25.10]  Procedure(s) (LRB):  CORONARY ARTERY BYPASS GRAFTING (CABG) X 2 ; LIMA TO LAD; EVH OF BLE; ECC; JACOBY & EPIAORTIC U/S BY DR. GREEN ([facility]) (N/A) 5 Days Post-Op   Precautions: Fall Risk           Sternal Precautions: Move in the Tube    Chart, occupational therapy assessment, plan of care,

## 2025-05-13 NOTE — PROGRESS NOTES
Cardiac Surgery ICU Progress Note    Admit Date: 2025  POD:  5 Days Post-Op    Procedure:    25 with Dr. Longoria:   CORONARY ARTERY BYPASS GRAFTING (CABG) X 2 ; (LIMA TO LAD, SVG-OM) ; EVH OF BLE; ECC; JACOBY & EPIAORTIC U/S BY DR. GREEN ([facility])       [facility] Synopsis:  : Direct admit for insulin infusion, A1c 8.9.    DOS: Elective CABG with Dr. Longoria. Intra-op JACOBY with normal biventricular function, EF 55%. Transferred to CVICU on ailyn, precedex, and insulin with AV wires and 3 filiberto drains (2 med, L pleural). Extubated at 1455.  Norepi added. 1L LR and Albumin x 1 given.    POD 1: Vaso added this morning, CVP low, additional fluid resuscitation ordered. PT/OT evaluated, during discussion disclosed multiple falls  5/10 POD2: chest tubes removed. 2LNC Levophed 5 mc, SR.    POD3:  Norepi off this AM, start low dose BB. SR. RA. Transfer to step down   POD 4: NAEO. A wires pulled, V cut. BP borderline, decrease BB. Lasix 20mg IV BID. No bed in stepdown yet.    POD 5: BP borderline, BB held, DC this AM. Weight remains 13lbs up, increase Lasix 40mg IV BID with concentrated albumin.      Subjective:   Patient seen with Dr. Murillo, up in chair, afebrile, SR 80s, RA. She endorses some ongoing mild nausea.      Objective:   Vitals:  Blood pressure 105/60, pulse 74, temperature 97.9 °F (36.6 °C), temperature source Oral, resp. rate 14, height 1.702 m (5' 7\"), weight 94.8 kg (208 lb 14.4 oz), SpO2 96%, not currently breastfeeding.  Temp (24hrs), Av.2 °F (36.8 °C), Min:97.5 °F (36.4 °C), Max:98.8 °F (37.1 °C)      Oxygen Delivery Method: O2 Device: None (Room air)    EKG/Rhythm:  SR 80s      Extubation Date / Time:  at 1455    CXR: PA/Lat pending  Xray Result (most recent):  XR CHEST PORTABLE 2025    Narrative  INDICATION:  Post op open heart surgery    EXAM: Portable chest 0414 . Comparison prior day.    FINDINGS: Interstitial prominence continues to improve with minimal

## 2025-05-13 NOTE — DISCHARGE INSTRUCTIONS
Cardiac Surgery Specialist    [address]       [address]                             [place] [address]                       [facility] [address]  Office- [phone]  Fax- [phone]       Office- [phone]  Fax- [phone]  _____________________________________________________________  Dr. Mike Schneider, NP            Yulissa Aiken, NP  Dr. Quirino Madrid,NP       CLARIBEL Morrow, PADuranC  Dr. Jai Hurd, CRISS Ng, PADuranC  Dr. Jamie Schultz, NP                    Salty Tyler, PADuranC     Dr. Alex Lewis, CRISS                   _____________________________________________________________    Name:Ping Salazar     Surgery & Date:   5/8/25 with Dr. Longoria:   CORONARY ARTERY BYPASS GRAFTING (CABG) X 2 ; (LIMA TO LAD, SVG-OM) ; EVH OF BLE; ECC; JACOBY & EPIAORTIC U/S BY DR. GREEN ([facility])       Discharge Date: 5/14/25    MEDICATIONS:  Please refer to your After Visit Summary for your medication list. If you do not have a prescription for a new medication, you may purchase the medication over the counter.   DO NOT TAKE ANY MEDICATIONS THAT ARE NOT ON THIS LIST    INSTRUCTIONS:  NO SMOKING OR TOBACCO PRODUCTS  Follow all the instructions in your discharge book  You may shower.  Wash all incisions twice daily with mild soap and water.  No lotions, ointments or powder.  Call the office immediately for any redness, swelling, or drainage from your incision.   Take your temperature daily and call for a temperature of 101 degrees or higher or for any symptoms that make you think you have and infection.  Weigh yourself each morning.  Call if you gain more than 5 pounds in 48 hours.  Use the incentive spirometer 6-8 times a day-10 breaths each time.  Use a pillow or your bear to splint your breastbone when coughing or sneezing.  If you feel your

## 2025-05-13 NOTE — DIABETES MGMT
[facility]  PROGRAM FOR DIABETES HEALTH  DIABETES MANAGEMENT CONSULT    Evaluation and Action Plan   Ping Salazar is a 70 y.o. with uncontrolled T2D with neuropathy, HFimpEF, and TIA (2023).  Admitted to [facility] 5/7 prior to CABG on 5/8 to initiate insulin infusion for A1C 9%.  S/p cardiac cath 4/16/25 as outpatient which showed severe diffuse multi-vessel CAD with normal lvedp.  Diabetes mgmt consulted by cardiac surgery team for advanced nursing diabetes evaluation, inpatient BG mgmt while patient is receiving insulin infusion both pre and post op.      Patient reports living with diabetes since in her 40's.  Verified she is prescribed Jardiance 10mg and Metformin 1000 mg BID by her cardiologist.  She is not established with PCP.  She endorses she was \"just started\" on the Jardiance recently.  She admits to not taking her Metformin like she should and on average usually takes one pill per day and does miss 1 full day of metformin in a week's time.  She was not monitoring her BG PTA.  She was initially reluctant to disclose her diet practices but her son (at bedside) encouraged her to report her diet intake.  She usually eats a [employer] breakfast of biscuit and gravy with hash brown , does admit to eating out a lot and occasionally enjoys milkshakes or ice cream sandwiches. Counseled patient re: diet adjustments and modifying her portions of starches and sweets.  She was receptive to the initial education provided.  Also discussed her elevated A1C and likelihood of medication adjustments at discharge.       Plan: Insulin infusion initiated day before surgery due to elevated A1C.  Now post op day 1 on 5/9.  Patient noted to be sitting up in bedside chair today.  RN reported high needs from insulin infusion.  Past 24h needs 37.6 units.  Hourly rate overnight 1u/hr and higher at meals. Plant to transition off insulin 5/10 with daily basal insulin and bolus insulin as appropriate.  5/13: BG trends improved on daily  I reviewed the patient's chart and discussed the case with the patient's team.      I examined Alexa Otero at bedside.  The patient maintains capacity for complex medical decision-making.  I spoke with patient and family at bedside.    They remain well up-to-date on her overall care.  At this point her primary goals are comfort and relief of suffering.  I recommended enlisting the services of hospice today.  They agree.    They specifically asked my opinion on continuing with intense oxygen therapy.  I explained without the oxygen therapy I would not expect her to live alone time; likely minutes to hours.  They asked about the timing of discontinuing high-flow oxygen therapy.  I explained this timing would be up to them, or if her condition changes and high-flow oxygen therapies only prolonging suffering in the point of death.  They understood.    Discussed the above with inpatient hospice service and primary team.    I have adjusted her comfort care regimen as well.    I appreciate being involved.  I hope I have been helpful.     inpatient management  Explored corrective strategies with patient and responsible inpatient provider   Informed patient of rational for insulin strategy while hospitalized     Nursing Diagnosis 83867 Ineffective Health Management   Nursing Intervention Domain 5259 Decision-making Support   Nursing Interventions Identified diabetes self-management practices impeding diabetes control  Discussed diabetes survival skills related to  Healthy Plate eating plan; given handouts  Role of physical activity in improving insulin sensitivity and action  Procedure for blood glucose monitoring & options for low-cost products  Medications plan at discharge     Billing Code(s)   94466    Before making these care recommendations, I personally reviewed the hospitalization record, including notes, laboratory & diagnostic data and current medications, and examined the patient at the bedside.  Total minutes: 25    NOÉ GAMING - CNS   Diabetes Clinical Nurse Specialist   Program for Diabetes Health  Access via [employer]

## 2025-05-13 NOTE — PROGRESS NOTES
Cardiac Surgery Care Coordinator- Met with Ping Salazar  reviewed plan of care and discussed upcoming discharge date. Reinforced sternal precautions and encouraged continued use of the incentive spirometer. Began discharge teaching and encouraged her to verbalize. Reviewed goals for the day and discussed the importance of increased activity and continued activity after discharge. Will continue to follow for educational and emotional needs.

## 2025-05-13 NOTE — CARE COORDINATION
Transition of Care Plan:    HH was ordered, however patient has declined HH due to home situation.  Cardiac surgery to follow up after discharge.    RUR: 11%  Prior Level of Functioning: Independent  Disposition: Home with family.  Patient declined HH.  ELIGIO: 5/14/2025  If SNF or IPR: Date FOC offered:   Date FOC received:   Accepting facility:   Date authorization started with reference number:   Date authorization received and expires:   Follow up appointments: PCP/Cardiac rehab  DME needed: None  Transportation at discharge: Family  IM/IMM Medicare/ letter given: 5/7/2025  Is patient a  and connected with [facility]?    If yes, was [facility] transfer form completed and [facility] notified?   Caregiver Contact: Cody Wellsis [phone]  Discharge Caregiver contacted prior to discharge? No  Care Conference needed? No  Barriers to discharge:  Medical stability    Patricia Jurado RN/CRM  [phone]

## 2025-05-13 NOTE — PROGRESS NOTES
0800: Received report from Alice ROCHE RN    1100: Patient working with PT/OT.     1750: Report given to LANEY Gallo.     1810: Patient transferred in wheelchair on telebox with RN to [facility]. Patient belongings and chart transferred with patient.
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
## 2025-05-13 NOTE — DISCHARGE SUMMARY
Cardiac Surgery Discharge Summary     Patient ID:  Ping Salazar  [id number]  70 y.o.  1955    Admit date: 5/7/2025    Discharge date: 5/14/2025     Admitting Physician: René Longoria MD     Referring Cardiologist:  Dr. Muñoz    PCP:  No, Pcp    Admitting Diagnoses: CAD    Discharge Diagnoses: s/p CABG    1. CAD in native artery    2. Type 2 diabetes mellitus with other circulatory complication, without long-term current use of insulin (HCC)    3. Type 2 diabetes mellitus with hyperglycemia (HCC)    4. S/P CABG x 2        Discharged Condition: Stable    Disposition: home, see patient instructions for treatment and plan    Procedures for this admission:    5/8/25 with Dr. Longoria:   CORONARY ARTERY BYPASS GRAFTING (CABG) X 2 ; (LIMA TO LAD, SVG-OM) ; EVH OF BLE; ECC; JACOBY & EPIAORTIC U/S BY DR. GREEN ([facility])      Discharge Medications:      Medication List        START taking these medications      Blood Glucose Monitor System w/Device Kit  Pharmacist to identify preferred meter and strips.     blood glucose test strips  by Other route 2 times daily Test 2 times a day BEFORE breakfast and BEDTIME  & as needed for symptoms of irregular blood glucose. Dispense sufficient amount for indicated testing frequency plus additional to accommodate PRN testing needs. Pharmacist to identify preferred brand.     clopidogrel 75 MG tablet  Commonly known as: PLAVIX  Take 1 tablet by mouth daily     diphenhydrAMINE 25 MG capsule  Commonly known as: BENADRYL  Take 1 capsule by mouth nightly as needed for Sleep (insomnia)     furosemide 40 MG tablet  Commonly known as: LASIX  Take 1 tablet by mouth 2 times daily for 3 days, THEN 1 tablet daily for 7 days.  Start taking on: May 14, 2025     glimepiride 2 MG tablet  Commonly known as: AMARYL  Take 1 tablet by mouth every morning (before breakfast)     Lancets 30G Misc  Test 2 times a day & as needed for symptoms of irregular blood glucose. Dispense sufficient amount for

## 2025-05-13 NOTE — PLAN OF CARE
Problem: Physical Therapy - Adult  Goal: By Discharge: Performs mobility at highest level of function for planned discharge setting.  See evaluation for individualized goals.  Description: FUNCTIONAL STATUS PRIOR TO ADMISSION: Patient was independent and active without use of DME, but does report history recent falls.      HOME SUPPORT PRIOR TO ADMISSION: The patient lived with family, but unclear level of s/a available.    Physical Therapy Goals  Initiated 5/9/2025  1.  Patient will move from supine to sit and sit to supine in bed with supervision/set-up within 5 day(s).    2.  Patient will perform sit to stand with supervision/set-up within 5 day(s).  3.  Patient will transfer from bed to chair and chair to bed with supervision/set-up using the least restrictive device within 5 day(s).  4.  Patient will ambulate with supervision/set-up for 150 feet with the least restrictive device within 5 day(s).   5.  Patient will perform cardiac exercises per protocol with supervision/set-up within 5 days.  6.  Patient will verbally recall and functionally demonstrate mindful-based movements (\"move in the tube\") principles without cues within 5 days.   Outcome: Progressing     PHYSICAL THERAPY TREATMENT    Patient: Ping Salazar (70 y.o. female)  Date: 5/13/2025  Diagnosis: CAD (coronary artery disease) [I25.10]  CAD in native artery [I25.10] CAD in native artery  Procedure(s) (LRB):  CORONARY ARTERY BYPASS GRAFTING (CABG) X 2 ; LIMA TO LAD; EVH OF BLE; ECC; JACOBY & EPIAORTIC U/S BY DR. GREEN ([facility]) (N/A) 5 Days Post-Op  Precautions: Restrictions/Precautions  Restrictions/Precautions: Fall Risk     Position Activity Restriction  Sternal Precautions: Move in the Tube      ASSESSMENT:  Patient continues to benefit from skilled PT services and is slowly progressing towards goals. Pt received in chair agreeable to treatment. Limited carry over/recall of precautions. Reviewed extensively prior to mobility. Patient tolerated

## 2025-05-13 NOTE — PROGRESS NOTES
1755: report received from DANNY Jenkins RN.    1820: pt arrived to room 465. Pt oriented to unit, vital signs taken. Chair alarm set. Call bell within reach.     1826: messaged TRICIA Ng regarding pt reporting dizziness and /33.     1830: pt returned back to bed. Pt states dizziness improved. /42.     1930: Bedside and Verbal shift change report given to LANEY Barron (oncoming nurse) by LANEY Gallo (offgoing nurse). Report included the following information Nurse Handoff Report, Intake/Output, and Cardiac Rhythm sinus .

## 2025-05-14 ENCOUNTER — APPOINTMENT (OUTPATIENT)
Facility: HOSPITAL | Age: 70
DRG: 236 | End: 2025-05-14
Attending: THORACIC SURGERY (CARDIOTHORACIC VASCULAR SURGERY)
Payer: MEDICARE

## 2025-05-14 VITALS
RESPIRATION RATE: 23 BRPM | BODY MASS INDEX: 32.53 KG/M2 | WEIGHT: 207.23 LBS | TEMPERATURE: 98.6 F | SYSTOLIC BLOOD PRESSURE: 106 MMHG | HEIGHT: 67 IN | DIASTOLIC BLOOD PRESSURE: 51 MMHG | OXYGEN SATURATION: 95 % | HEART RATE: 69 BPM

## 2025-05-14 PROBLEM — R73.9 STRESS HYPERGLYCEMIA: Status: RESOLVED | Noted: 2025-05-12 | Resolved: 2025-05-14

## 2025-05-14 LAB
ANION GAP SERPL CALC-SCNC: 4 MMOL/L (ref 2–12)
BASOPHILS # BLD: 0.02 K/UL (ref 0–0.1)
BASOPHILS NFR BLD: 0.3 % (ref 0–1)
BUN SERPL-MCNC: 18 MG/DL (ref 6–20)
BUN/CREAT SERPL: 31 (ref 12–20)
CALCIUM SERPL-MCNC: 8.7 MG/DL (ref 8.5–10.1)
CHLORIDE SERPL-SCNC: 104 MMOL/L (ref 97–108)
CO2 SERPL-SCNC: 30 MMOL/L (ref 21–32)
CREAT SERPL-MCNC: 0.59 MG/DL (ref 0.55–1.02)
DIFFERENTIAL METHOD BLD: ABNORMAL
EOSINOPHIL # BLD: 0.16 K/UL (ref 0–0.4)
EOSINOPHIL NFR BLD: 2.1 % (ref 0–7)
ERYTHROCYTE [DISTWIDTH] IN BLOOD BY AUTOMATED COUNT: 13.4 % (ref 11.5–14.5)
GLUCOSE BLD STRIP.AUTO-MCNC: 287 MG/DL (ref 65–117)
GLUCOSE SERPL-MCNC: 181 MG/DL (ref 65–100)
HCT VFR BLD AUTO: 25.6 % (ref 35–47)
HGB BLD-MCNC: 8.2 G/DL (ref 11.5–16)
IMM GRANULOCYTES # BLD AUTO: 0.03 K/UL (ref 0–0.04)
IMM GRANULOCYTES NFR BLD AUTO: 0.4 % (ref 0–0.5)
LYMPHOCYTES # BLD: 1.35 K/UL (ref 0.8–3.5)
LYMPHOCYTES NFR BLD: 18 % (ref 12–49)
MAGNESIUM SERPL-MCNC: 2.2 MG/DL (ref 1.6–2.4)
MCH RBC QN AUTO: 30.6 PG (ref 26–34)
MCHC RBC AUTO-ENTMCNC: 32 G/DL (ref 30–36.5)
MCV RBC AUTO: 95.5 FL (ref 80–99)
MONOCYTES # BLD: 0.82 K/UL (ref 0–1)
MONOCYTES NFR BLD: 10.9 % (ref 5–13)
NEUTS SEG # BLD: 5.11 K/UL (ref 1.8–8)
NEUTS SEG NFR BLD: 68.3 % (ref 32–75)
NRBC # BLD: 0.07 K/UL (ref 0–0.01)
NRBC BLD-RTO: 0.9 PER 100 WBC
PLATELET # BLD AUTO: 236 K/UL (ref 150–400)
PMV BLD AUTO: 9.7 FL (ref 8.9–12.9)
POTASSIUM SERPL-SCNC: 4.7 MMOL/L (ref 3.5–5.1)
RBC # BLD AUTO: 2.68 M/UL (ref 3.8–5.2)
SERVICE CMNT-IMP: ABNORMAL
SODIUM SERPL-SCNC: 138 MMOL/L (ref 136–145)
WBC # BLD AUTO: 7.5 K/UL (ref 3.6–11)

## 2025-05-14 PROCEDURE — 36415 COLL VENOUS BLD VENIPUNCTURE: CPT

## 2025-05-14 PROCEDURE — 83735 ASSAY OF MAGNESIUM: CPT

## 2025-05-14 PROCEDURE — 97530 THERAPEUTIC ACTIVITIES: CPT

## 2025-05-14 PROCEDURE — 80048 BASIC METABOLIC PNL TOTAL CA: CPT

## 2025-05-14 PROCEDURE — 6370000000 HC RX 637 (ALT 250 FOR IP)

## 2025-05-14 PROCEDURE — 97535 SELF CARE MNGMENT TRAINING: CPT

## 2025-05-14 PROCEDURE — 6360000002 HC RX W HCPCS: Performed by: NURSE PRACTITIONER

## 2025-05-14 PROCEDURE — 6370000000 HC RX 637 (ALT 250 FOR IP): Performed by: NURSE PRACTITIONER

## 2025-05-14 PROCEDURE — 2500000003 HC RX 250 WO HCPCS: Performed by: PHYSICIAN ASSISTANT

## 2025-05-14 PROCEDURE — 6370000000 HC RX 637 (ALT 250 FOR IP): Performed by: CLINICAL NURSE SPECIALIST

## 2025-05-14 PROCEDURE — 71045 X-RAY EXAM CHEST 1 VIEW: CPT

## 2025-05-14 PROCEDURE — P9047 ALBUMIN (HUMAN), 25%, 50ML: HCPCS | Performed by: NURSE PRACTITIONER

## 2025-05-14 PROCEDURE — 85025 COMPLETE CBC W/AUTO DIFF WBC: CPT

## 2025-05-14 PROCEDURE — 82962 GLUCOSE BLOOD TEST: CPT

## 2025-05-14 RX ORDER — FUROSEMIDE 40 MG/1
TABLET ORAL
Qty: 13 TABLET | Refills: 0 | Status: SHIPPED | OUTPATIENT
Start: 2025-05-14 | End: 2025-05-24

## 2025-05-14 RX ORDER — AMIODARONE HYDROCHLORIDE 200 MG/1
TABLET ORAL
Qty: 68 TABLET | Refills: 0 | Status: SHIPPED | OUTPATIENT
Start: 2025-05-14 | End: 2025-05-20 | Stop reason: SINTOL

## 2025-05-14 RX ORDER — LIDOCAINE 4 G/G
2 PATCH TOPICAL DAILY
Qty: 14 PATCH | Refills: 0 | Status: SHIPPED | OUTPATIENT
Start: 2025-05-14

## 2025-05-14 RX ORDER — DIPHENHYDRAMINE HCL 25 MG
25 CAPSULE ORAL NIGHTLY PRN
Qty: 2 CAPSULE | Refills: 1 | Status: SHIPPED
Start: 2025-05-14 | End: 2025-05-24

## 2025-05-14 RX ORDER — METOPROLOL SUCCINATE 25 MG/1
12.5 TABLET, EXTENDED RELEASE ORAL DAILY
Qty: 30 TABLET | Refills: 2 | Status: SHIPPED | OUTPATIENT
Start: 2025-05-14

## 2025-05-14 RX ORDER — POTASSIUM CHLORIDE 1500 MG/1
TABLET, EXTENDED RELEASE ORAL
Qty: 13 TABLET | Refills: 0 | Status: SHIPPED | OUTPATIENT
Start: 2025-05-14 | End: 2025-05-24

## 2025-05-14 RX ORDER — INSULIN GLARGINE 100 [IU]/ML
30 INJECTION, SOLUTION SUBCUTANEOUS DAILY
Status: DISCONTINUED | OUTPATIENT
Start: 2025-05-14 | End: 2025-05-14 | Stop reason: HOSPADM

## 2025-05-14 RX ORDER — SENNA AND DOCUSATE SODIUM 50; 8.6 MG/1; MG/1
1 TABLET, FILM COATED ORAL 2 TIMES DAILY
Qty: 60 TABLET | Refills: 0 | Status: SHIPPED | OUTPATIENT
Start: 2025-05-14

## 2025-05-14 RX ORDER — LANOLIN ALCOHOL/MO/W.PET/CERES
400 CREAM (GRAM) TOPICAL 2 TIMES DAILY
Qty: 60 TABLET | Refills: 0 | Status: SHIPPED | OUTPATIENT
Start: 2025-05-14 | End: 2025-05-20 | Stop reason: SINTOL

## 2025-05-14 RX ORDER — POLYETHYLENE GLYCOL 3350 17 G/17G
17 POWDER, FOR SOLUTION ORAL DAILY
Qty: 527 G | Refills: 0 | Status: SHIPPED | OUTPATIENT
Start: 2025-05-14 | End: 2025-06-13

## 2025-05-14 RX ORDER — CLOPIDOGREL BISULFATE 75 MG/1
75 TABLET ORAL DAILY
Qty: 30 TABLET | Refills: 3 | Status: SHIPPED | OUTPATIENT
Start: 2025-05-14

## 2025-05-14 RX ORDER — METOPROLOL SUCCINATE 25 MG/1
12.5 TABLET, EXTENDED RELEASE ORAL DAILY
Status: DISCONTINUED | OUTPATIENT
Start: 2025-05-14 | End: 2025-05-14 | Stop reason: HOSPADM

## 2025-05-14 RX ORDER — OXYCODONE HYDROCHLORIDE 5 MG/1
5 TABLET ORAL EVERY 6 HOURS PRN
Qty: 6 TABLET | Refills: 0 | Status: SHIPPED | OUTPATIENT
Start: 2025-05-14 | End: 2025-05-17

## 2025-05-14 RX ORDER — FUROSEMIDE 40 MG/1
40 TABLET ORAL 2 TIMES DAILY
Status: DISCONTINUED | OUTPATIENT
Start: 2025-05-14 | End: 2025-05-14 | Stop reason: HOSPADM

## 2025-05-14 RX ORDER — POTASSIUM CHLORIDE 750 MG/1
20 TABLET, EXTENDED RELEASE ORAL 2 TIMES DAILY
Status: DISCONTINUED | OUTPATIENT
Start: 2025-05-14 | End: 2025-05-14 | Stop reason: HOSPADM

## 2025-05-14 RX ORDER — ATORVASTATIN CALCIUM 80 MG/1
80 TABLET, FILM COATED ORAL NIGHTLY
Qty: 90 TABLET | Refills: 3 | Status: SHIPPED
Start: 2025-05-14

## 2025-05-14 RX ORDER — ACETAMINOPHEN 500 MG
1000 TABLET ORAL EVERY 6 HOURS
Qty: 120 TABLET | Refills: 3 | Status: SHIPPED
Start: 2025-05-14

## 2025-05-14 RX ORDER — METHOCARBAMOL 500 MG/1
500 TABLET, FILM COATED ORAL 3 TIMES DAILY PRN
Qty: 12 TABLET | Refills: 0 | Status: SHIPPED | OUTPATIENT
Start: 2025-05-14 | End: 2025-05-24

## 2025-05-14 RX ADMIN — POTASSIUM CHLORIDE 20 MEQ: 750 TABLET, FILM COATED, EXTENDED RELEASE ORAL at 09:05

## 2025-05-14 RX ADMIN — ASPIRIN 81 MG: 81 TABLET, COATED ORAL at 09:06

## 2025-05-14 RX ADMIN — METOPROLOL SUCCINATE 12.5 MG: 25 TABLET, EXTENDED RELEASE ORAL at 09:06

## 2025-05-14 RX ADMIN — Medication 400 MG: at 09:08

## 2025-05-14 RX ADMIN — ALBUMIN (HUMAN) 12.5 G: 0.25 INJECTION, SOLUTION INTRAVENOUS at 04:22

## 2025-05-14 RX ADMIN — AMIODARONE HYDROCHLORIDE 400 MG: 200 TABLET ORAL at 09:07

## 2025-05-14 RX ADMIN — INSULIN GLARGINE 30 UNITS: 100 INJECTION, SOLUTION SUBCUTANEOUS at 09:18

## 2025-05-14 RX ADMIN — ONDANSETRON 4 MG: 2 INJECTION, SOLUTION INTRAMUSCULAR; INTRAVENOUS at 04:27

## 2025-05-14 RX ADMIN — CLOPIDOGREL BISULFATE 75 MG: 75 TABLET ORAL at 09:05

## 2025-05-14 RX ADMIN — INSULIN LISPRO 10 UNITS: 100 INJECTION, SOLUTION INTRAVENOUS; SUBCUTANEOUS at 09:19

## 2025-05-14 RX ADMIN — SODIUM CHLORIDE, PRESERVATIVE FREE 10 ML: 5 INJECTION INTRAVENOUS at 09:08

## 2025-05-14 RX ADMIN — PAROXETINE HYDROCHLORIDE 60 MG: 20 TABLET, FILM COATED ORAL at 09:05

## 2025-05-14 RX ADMIN — ACETAMINOPHEN 1000 MG: 500 TABLET ORAL at 06:43

## 2025-05-14 RX ADMIN — INSULIN LISPRO 6 UNITS: 100 INJECTION, SOLUTION INTRAVENOUS; SUBCUTANEOUS at 13:12

## 2025-05-14 RX ADMIN — CHLORHEXIDINE GLUCONATE 15 ML: 1.2 RINSE ORAL at 09:19

## 2025-05-14 RX ADMIN — INSULIN LISPRO 2 UNITS: 100 INJECTION, SOLUTION INTRAVENOUS; SUBCUTANEOUS at 09:18

## 2025-05-14 RX ADMIN — INSULIN LISPRO 10 UNITS: 100 INJECTION, SOLUTION INTRAVENOUS; SUBCUTANEOUS at 13:12

## 2025-05-14 RX ADMIN — FUROSEMIDE 40 MG: 40 TABLET ORAL at 09:06

## 2025-05-14 ASSESSMENT — PAIN DESCRIPTION - ORIENTATION
ORIENTATION: MID;ANTERIOR
ORIENTATION: MID;ANTERIOR

## 2025-05-14 ASSESSMENT — PAIN SCALES - GENERAL
PAINLEVEL_OUTOF10: 6
PAINLEVEL_OUTOF10: 0
PAINLEVEL_OUTOF10: 2

## 2025-05-14 ASSESSMENT — PAIN DESCRIPTION - DESCRIPTORS
DESCRIPTORS: DISCOMFORT
DESCRIPTORS: DISCOMFORT

## 2025-05-14 ASSESSMENT — PAIN DESCRIPTION - LOCATION
LOCATION: INCISION
LOCATION: INCISION

## 2025-05-14 NOTE — PLAN OF CARE
Problem: Chronic Conditions and Co-morbidities  Goal: Patient's chronic conditions and co-morbidity symptoms are monitored and maintained or improved  5/14/2025 1055 by Cleo Robins RN  Outcome: Completed  5/14/2025 1051 by Cleo Robins RN  Outcome: Progressing     Problem: Discharge Planning  Goal: Discharge to home or other facility with appropriate resources  5/14/2025 1055 by Cleo Robins RN  Outcome: Completed  5/14/2025 1051 by Cleo Robins RN  Outcome: Progressing     Problem: ABCDS Injury Assessment  Goal: Absence of physical injury  5/14/2025 1055 by Cleo Robins RN  Outcome: Completed  5/14/2025 1051 by Cleo Robins RN  Outcome: Progressing     Problem: Safety - Adult  Goal: Free from fall injury  5/14/2025 1055 by Cleo Robins RN  Outcome: Completed  5/14/2025 1051 by Cleo Robins RN  Outcome: Progressing     Problem: Skin/Tissue Integrity  Goal: Skin integrity remains intact  Description: 1.  Monitor for areas of redness and/or skin breakdown2.  Assess vascular access sites hourly3.  Every 4-6 hours minimum:  Change oxygen saturation probe site4.  Every 4-6 hours:  If on nasal continuous positive airway pressure, respiratory therapy assess nares and determine need for appliance change or resting period  5/14/2025 1055 by Cleo Robins RN  Outcome: Completed  5/14/2025 1051 by Cleo Robins RN  Outcome: Progressing     Problem: Pain  Goal: Verbalizes/displays adequate comfort level or baseline comfort level  5/14/2025 1055 by Cleo Robins RN  Outcome: Completed  5/14/2025 1051 by Cleo Robins RN  Outcome: Progressing

## 2025-05-14 NOTE — CARE COORDINATION
ZANDRA    The discharge order is in and CM met w patient at bedside to discuss the d/c plan w returning home.  Patient declined home health due to her social situation and at her request was provided an outpt rx for PT/OT which was approved yest from NP, REYNALDO Schneider.  Patient is aware cardiac rehab is the goal once medically ready. Patient reports her son will transport home and CM encouraged her to review the resources on her AVS.  CM also reviewed an important message from Medicare and Patient verbalized understanding and gave permission for possible discharge within 4 hours of receiving IMM.  All questions have been answered and CM wished patient well.     PT recommends a RW and this was ordered in [facility] from [employer].  Once approved by provider and insurance company it will be delivered to the bedside.    AARON Lobato CCM  Care Management   Available on [employer] or [phone]

## 2025-05-14 NOTE — PLAN OF CARE
Problem: Chronic Conditions and Co-morbidities  Goal: Patient's chronic conditions and co-morbidity symptoms are monitored and maintained or improved  Outcome: Progressing     Problem: ABCDS Injury Assessment  Goal: Absence of physical injury  Outcome: Progressing     Problem: Safety - Adult  Goal: Free from fall injury  Outcome: Progressing     Problem: Skin/Tissue Integrity  Goal: Skin integrity remains intact  Description: 1.  Monitor for areas of redness and/or skin breakdown2.  Assess vascular access sites hourly3.  Every 4-6 hours minimum:  Change oxygen saturation probe site4.  Every 4-6 hours:  If on nasal continuous positive airway pressure, respiratory therapy assess nares and determine need for appliance change or resting period  Outcome: Progressing     Problem: Pain  Goal: Verbalizes/displays adequate comfort level or baseline comfort level  Outcome: Progressing     Problem: Discharge Planning  Goal: Discharge to home or other facility with appropriate resources  Outcome: Progressing

## 2025-05-14 NOTE — PROGRESS NOTES
0740  Verbal report given to Nurse LANEY Gallo by LANEY Barron. Report included Nurse Handoff Report, Index, Adult overview, MAR, Intake and Output, and Cardiac Rhythm Sinus Rhythm on telemeter     0615  Assisted patient to recliner    0540  CHG bath done, changed EKG stickers, patient gown and skid socks    0427  Given PRN Zofran, Please See MAR    0425  Offered strawberry flavored ensure, patient drank 1/4 of the container  Offered ice cold water, still feels nauseous    0423  Patient requested other flavor of ensure    0420  Patient complain of nausea    0400  Assisted patient to bathroom  Weighed patient    1954  Received verbal report from Nurse LANEY Gallo  Noted patient in Sinus Rhythm on telemeter

## 2025-05-14 NOTE — PLAN OF CARE
Problem: Occupational Therapy - Adult  Goal: By Discharge: Performs self-care activities at highest level of function for planned discharge setting.  See evaluation for individualized goals.  Description: FUNCTIONAL STATUS PRIOR TO ADMISSION: Patient was overall indep for ADL, indep for functional mobility without device. Lived in a house with family members.Endorses extensive fall history, PMH also significant for multiple CVA workups (MRIs all negative from chart review, patient unable to elaborate during evaluation).      Home Equipment: None  Prior Level of Assist for ADLs: Independent  Prior Level of Assist for Homemaking: Independent     Prior Level of Assist for Transfers: Independent  Active : Yes     Occupational Therapy Goals:  Initiated 5/9/2025  1.  Patient will perform standing grooming with Contact Guard Assist within 7 day(s).  2.  Patient will perform LE dressing with Contact Guard Assist within 7 day(s).  3.  Patient will perform UE dressing with Contact Guard Assist within 7 day(s).  4.  Patient will perform toilet transfers with Minimal Assist  within 7 day(s).  5.  Patient will perform all aspects of toileting with Minimal Assist within 7 day(s).  6.  Patient will participate in upper extremity therapeutic exercise/activities with Minimal Assist for 10 minutes within 7 day(s).    7.  Patient will utilize energy conservation techniques during functional activities with verbal cues within 7 day(s).    Outcome: Progressing   OCCUPATIONAL THERAPY TREATMENT  Patient: Ping Salazar (70 y.o. female)  Date: 5/14/2025  Primary Diagnosis: CAD (coronary artery disease) [I25.10]  CAD in native artery [I25.10]  Procedure(s) (LRB):  CORONARY ARTERY BYPASS GRAFTING (CABG) X 2 ; LIMA TO LAD; EVH OF BLE; ECC; JACOBY & EPIAORTIC U/S BY DR. GREEN ([facility]) (N/A) 6 Days Post-Op   Precautions: Fall Risk           Sternal Precautions: Move in the Tube    Chart, occupational therapy assessment, plan of care,

## 2025-05-14 NOTE — PROGRESS NOTES
Cardiac Surgery ICU Progress Note    Admit Date: 2025  POD:  6 Days Post-Op    Procedure:    25 with Dr. Longoria:   CORONARY ARTERY BYPASS GRAFTING (CABG) X 2 ; (LIMA TO LAD, SVG-OM) ; EVH OF BLE; ECC; JACOBY & EPIAORTIC U/S BY DR. GREEN ([facility])       [facility] Synopsis:  : Direct admit for insulin infusion, A1c 8.9.    DOS: Elective CABG with Dr. Longoria. Intra-op JACOBY with normal biventricular function, EF 55%. Transferred to CVICU on ailyn, precedex, and insulin with AV wires and 3 filiberto drains (2 med, L pleural). Extubated at 1455.  Norepi added. 1L LR and Albumin x 1 given.    POD 1: Vaso added this morning, CVP low, additional fluid resuscitation ordered. PT/OT evaluated, during discussion disclosed multiple falls  5/10 POD2: chest tubes removed. 2LNC Levophed 5 mc, SR.    POD3:  Norepi off this AM, start low dose BB. SR. RA. Transfer to step down   POD 4: NAEO. A wires pulled, V cut. BP borderline, decrease BB. Lasix 20mg IV BID. No bed in stepdown yet.    POD 5: BP borderline, BB held, DC this AM. Weight remains 13lbs up, increase Lasix 40mg IV BID with concentrated albumin.    POD6: D/c home.     Subjective:   Patient seen with Dr. Longoria, up in chair, afebrile, SR 80s, RA. She endorses feeling a little improved from yesterday but still not well.     Objective:   Vitals:  Blood pressure 106/60, pulse 78, temperature 98.3 °F (36.8 °C), temperature source Oral, resp. rate 21, height 1.702 m (5' 7\"), weight 94 kg (207 lb 3.7 oz), SpO2 97%, not currently breastfeeding.  Temp (24hrs), Av.3 °F (36.8 °C), Min:97.9 °F (36.6 °C), Max:98.7 °F (37.1 °C)      Oxygen Delivery Method: O2 Device: None (Room air)    EKG/Rhythm:  SR 80s      Extubation Date / Time:  at 1455    CXR: PA/Lat pending  Xray Result (most recent):  XR CHEST PORTABLE 2025    Narrative  EXAM:  XR CHEST PORTABLE    INDICATION: Post op open heart surgery    COMPARISON: 2025    TECHNIQUE: 0413 hours portable  prioritized ASA and Plavix     Dispo: D/c home today. Pt declined HH d/t ex- not wanting anyone in the home.    Signed By: NOÉ Briceno - NP

## 2025-05-14 NOTE — PLAN OF CARE
0730: Bedside and Verbal shift change report given to LANEY Gallo (oncoming nurse) by LANEY Barron (offgoing nurse). Report included the following information Nurse Handoff Report, Intake/Output, and Cardiac Rhythm sinus .     1530: discharge instructions reviewed w/ pt at bedside. Pt verbalized an understanding of the teaching and all questions were answered. Pt discharged w/ all belongings including red cardiac surgery bracelet, at home walker, cell phone, and fan.    Problem: Chronic Conditions and Co-morbidities  Goal: Patient's chronic conditions and co-morbidity symptoms are monitored and maintained or improved  Outcome: Progressing     Problem: Discharge Planning  Goal: Discharge to home or other facility with appropriate resources  Outcome: Progressing     Problem: ABCDS Injury Assessment  Goal: Absence of physical injury  Outcome: Progressing     Problem: Safety - Adult  Goal: Free from fall injury  Outcome: Progressing     Problem: Skin/Tissue Integrity  Goal: Skin integrity remains intact  Description: 1.  Monitor for areas of redness and/or skin breakdown2.  Assess vascular access sites hourly3.  Every 4-6 hours minimum:  Change oxygen saturation probe site4.  Every 4-6 hours:  If on nasal continuous positive airway pressure, respiratory therapy assess nares and determine need for appliance change or resting period  Outcome: Progressing     Problem: Pain  Goal: Verbalizes/displays adequate comfort level or baseline comfort level  Outcome: Progressing

## 2025-05-14 NOTE — PLAN OF CARE
Problem: Physical Therapy - Adult  Goal: By Discharge: Performs mobility at highest level of function for planned discharge setting.  See evaluation for individualized goals.  Description: FUNCTIONAL STATUS PRIOR TO ADMISSION: Patient was independent and active without use of DME, but does report history recent falls.      HOME SUPPORT PRIOR TO ADMISSION: The patient lived with family, but unclear level of s/a available.    Physical Therapy Goals  Initiated 5/9/2025  1.  Patient will move from supine to sit and sit to supine in bed with supervision/set-up within 5 day(s).    2.  Patient will perform sit to stand with supervision/set-up within 5 day(s).  3.  Patient will transfer from bed to chair and chair to bed with supervision/set-up using the least restrictive device within 5 day(s).  4.  Patient will ambulate with supervision/set-up for 150 feet with the least restrictive device within 5 day(s).   5.  Patient will perform cardiac exercises per protocol with supervision/set-up within 5 days.  6.  Patient will verbally recall and functionally demonstrate mindful-based movements (\"move in the tube\") principles without cues within 5 days.   Outcome: Progressing   PHYSICAL THERAPY TREATMENT    Patient: Ping Salazar (70 y.o. female)  Date: 5/14/2025  Diagnosis: CAD (coronary artery disease) [I25.10]  CAD in native artery [I25.10] CAD in native artery  Procedure(s) (LRB):  CORONARY ARTERY BYPASS GRAFTING (CABG) X 2 ; LIMA TO LAD; EVH OF BLE; ECC; JACOBY & EPIAORTIC U/S BY DR. GREEN ([facility]) (N/A) 6 Days Post-Op  Precautions: Restrictions/Precautions  Restrictions/Precautions: Fall Risk     Position Activity Restriction  Sternal Precautions: Move in the Tube      ASSESSMENT:  Patient continues to benefit from skilled PT services and is progressing towards goals. Patient seen for ongoing PT intervention with continued good tolerance to functional mobility activity.  Focus of session on progression of upright

## 2025-05-14 NOTE — DIABETES MGMT
[facility]  PROGRAM FOR DIABETES HEALTH  DIABETES MANAGEMENT CONSULT    Evaluation and Action Plan   Ping Salazar is a 70 y.o. with uncontrolled T2D with neuropathy, HFimpEF, and TIA (2023).  Admitted to [facility] 5/7 prior to CABG on 5/8 to initiate insulin infusion for A1C 9%.  S/p cardiac cath 4/16/25 as outpatient which showed severe diffuse multi-vessel CAD with normal lvedp.  Diabetes mgmt consulted by cardiac surgery team for advanced nursing diabetes evaluation, inpatient BG mgmt while patient is receiving insulin infusion both pre and post op.      Patient reports living with diabetes since in her 40's.  Verified she is prescribed Jardiance 10mg and Metformin 1000 mg BID by her cardiologist.  She is not established with PCP.  She endorses she was \"just started\" on the Jardiance recently.  She admits to not taking her Metformin like she should and on average usually takes one pill per day and does miss 1 full day of metformin in a week's time.  She was not monitoring her BG PTA.  She was initially reluctant to disclose her diet practices but her son (at bedside) encouraged her to report her diet intake.  She usually eats a [employer] breakfast of biscuit and gravy with hash brown , does admit to eating out a lot and occasionally enjoys milkshakes or ice cream sandwiches. Counseled patient re: diet adjustments and modifying her portions of starches and sweets.  She was receptive to the initial education provided.  Also discussed her elevated A1C and likelihood of medication adjustments at discharge.       Plan: Insulin infusion initiated day before surgery due to elevated A1C.  Now post op day 1 on 5/9.  Patient noted to be sitting up in bedside chair today.  RN reported high needs from insulin infusion.  Past 24h needs 37.6 units.  Hourly rate overnight 1u/hr and higher at meals. Plant to transition off insulin 5/10 with daily basal insulin and bolus insulin as appropriate.  5/14: Basal insulin adjusted for  today.  Noted slated for discharge today to home.  During admission discussed nutrition label reading with patient as she had some questions about what to focus on when reading a nutrition label.  Explained to patient to focus on serving size and total carbs per serving when choosing food items.  Discussed her daily carbohydrate intake should not exceed 150-180g /day (50-60g /meal).  Will discharge home on PTA Metformin and Jardiance with new medication of Glimepiride with meals.  Patient encouraged to take medications as prescribed.  Blood glucose pattern    Significant diabetes-related events over the past 24-72 hours  A1C 9.0% in March 2025  Insulin infusion pre op on 5/7, will transition off 5/10.   Fasting BG:>200  Pre-prandial: 249-261  Basal: initiated basal insulin 5/12  Bolus: initiated bolus insulin TID 5/12  Correction: ACHS, cardiac surgery corrective scale   Management Rationale Action Plan   Medication  Continue Lantus 25 units daily  Adjusted  lispro 10 units TID with meals  Continue cardiac surgery corrective scale ACHS   Additional orders  Carb consistent diet (60g CHO/meal)       Diabetes Discharge Plan   Medication- A1C 8.9%.     RESUME Metformin and Jardiance per PTA dosing at discharge  Will need glucometer and supplies to monitor BG-SENT to [facility] OP pharmacy on 5/13  Glimepiride 2 mg daily at discharge -SENT to [facility] OP pharmacy on 5/13     Referral  [x]        Outpatient diabetes education-referral placed   Additional orders  Needs to establish care with new PCP-           Initial Presentation     HX:   Past Medical History:   Diagnosis Date    Anxiety     Cerebral artery occlusion with cerebral infarction (HCC)     Depression     Diabetes (HCC)     Falls     Fatigue     Neuropathy     Psychiatric disorder     depression    Snoring     Visual disturbance         INITIAL DX: CAD (coronary artery disease) [I25.10]  CAD in native artery [I25.10]     Current Treatment     TX: insulin

## 2025-05-15 ENCOUNTER — TELEPHONE (OUTPATIENT)
Age: 70
End: 2025-05-15

## 2025-05-15 NOTE — PROGRESS NOTES
Patient: Ping Salazar   Age: 70 y.o.     Patient Care Team:  No, Pcp as PCP - General  René Longoria MD as Consulting Physician (Cardiothoracic Surgery)  Joce Muñoz DO as Consulting Physician (Cardiovascular Disease)    Diagnosis: The encounter diagnosis was S/P CABG x 2.    Problem List:   Patient Active Problem List   Diagnosis    Depression    DM (diabetes mellitus), type 2 (HCC)    Vertigo    Vertebrobasilar insufficiency    TIA (transient ischemic attack)    Abnormal CT scan of heart    Disease of cardiovascular system    CAD in native artery    Hemoglobin A1C greater than 9%, indicating poor diabetic control    S/P CABG x 2      Date of Surgery: 5/8/25     Surgery:   CABG X 2 LIMA to LAD, RSVG to OM  BILAT ENDOSCOPIC SAPHENOUS VEIN HARVEST    HPI:  Pt is here for post op follow up. She complains of significant nausea that is impairing her ability to get up and walk around and eat. She feels miserable dt nausea. She denies pain. She has SOB with exertion that improves with rest. LE edema is improved. She has not been checking her BS.     Current Medications:   Current Outpatient Medications   Medication Sig Dispense Refill    ondansetron (ZOFRAN) 4 MG tablet Take 1 tablet by mouth 3 times daily as needed for Nausea or Vomiting 15 tablet 0    atorvastatin (LIPITOR) 80 MG tablet Take 1 tablet by mouth at bedtime 90 tablet 3    metoprolol succinate (TOPROL XL) 25 MG extended release tablet Take 0.5 tablets by mouth daily 30 tablet 2    potassium chloride (K-TAB) 20 MEQ TBCR extended release tablet Take 1 tablet by mouth 2 times daily for 3 days, THEN 1 tablet daily for 7 days. 13 tablet 0    clopidogrel (PLAVIX) 75 MG tablet Take 1 tablet by mouth daily 30 tablet 3    Blood Glucose Monitoring Suppl (BLOOD GLUCOSE MONITOR SYSTEM) w/Device KIT Pharmacist to identify preferred meter and strips. 1 kit 0    blood glucose monitor strips by Other route 2 times daily Test 2 times a day BEFORE breakfast and

## 2025-05-15 NOTE — TELEPHONE ENCOUNTER
Cardiac Surgery Discharge - Follow up call placed to Ping Salazar. No answer, left voicemail and contact information.

## 2025-05-16 NOTE — PROGRESS NOTES
Physician Progress Note      PATIENT:               TONYA JACOBS  [facility] #:                  [id number]  :                       1955  ADMIT DATE:       2025 10:11 AM  DISCH DATE:        2025 3:58 PM  RESPONDING  PROVIDER #:        Sachi Lewis NP          QUERY TEXT:    Acute Post-Operative Respiratory Insufficiency with Hypoxia is documented in   the medical record. However, the patient was extubated on POD 0 and only on O2   2lnc with pox readings in the upper 90's . Please provide additional clinical   indicators supportive of your documentation. Or please document if the   diagnosis of Acute Post-Operative Respiratory Insufficiency with Hypoxia has   been ruled out after study.    The clinical indicators include:  2-[id number]:Lungs demonstrate mild bibasilar atelectasis.  69-[id number]:  Unchanged cardiomegaly. Unchanged trace bilateral pleural effusions with  bibasilar atelectasis. Unchanged subsegmental atelectasis in the lingula.  -pox 100% on O2 2lnc  -Per CTS PN-\"Extubation Date / Time:  at 1455 HFimpEF: PTA Losartan,   Toprol XL, Jardiance;  EF 55% post op  Acute Post-Operative Respiratory Insufficiency with Hypoxia:Wean oxygen for   SpO2 > 92%  - Encourage pulmonary hygiene with IS, OOB, and ambulation  5/10-Per PN-Weaning O2 via NC as tolerated.  Patient utilizing IS well.  Options provided:  -- Acute Post-Operative Respiratory Insufficiency with Hypoxia present as   evidenced by, Please document evidence.  -- Acute Post-Operative Respiratory Insufficiency with Hypoxia was ruled out  -- Other - I will add my own diagnosis  -- Disagree - Not applicable / Not valid  -- Disagree - Clinically unable to determine / Unknown  -- Refer to Clinical Documentation Reviewer    PROVIDER RESPONSE TEXT:    Acute Post-Operative Respiratory Insufficiency with Hypoxia was ruled out   after study.    Query created by: Eva North on 5/15/2025 9:53 AM      Electronically signed by:

## 2025-05-20 ENCOUNTER — OFFICE VISIT (OUTPATIENT)
Age: 70
End: 2025-05-20

## 2025-05-20 VITALS
HEART RATE: 68 BPM | DIASTOLIC BLOOD PRESSURE: 68 MMHG | BODY MASS INDEX: 30.38 KG/M2 | OXYGEN SATURATION: 98 % | SYSTOLIC BLOOD PRESSURE: 120 MMHG | WEIGHT: 194 LBS | TEMPERATURE: 97.6 F

## 2025-05-20 DIAGNOSIS — Z95.1 S/P CABG X 2: Primary | ICD-10-CM

## 2025-05-20 PROCEDURE — 99024 POSTOP FOLLOW-UP VISIT: CPT | Performed by: THORACIC SURGERY (CARDIOTHORACIC VASCULAR SURGERY)

## 2025-05-20 RX ORDER — ONDANSETRON 4 MG/1
4 TABLET, FILM COATED ORAL 3 TIMES DAILY PRN
Qty: 15 TABLET | Refills: 0 | Status: SHIPPED | OUTPATIENT
Start: 2025-05-20

## 2025-05-29 NOTE — OP NOTE
[address]  [address]                            OPERATIVE REPORT      PATIENT NAME: TONYA JACOBS             : 1955  MED REC NO: [id number]                       ROOM: [facility]  ACCOUNT NO: [id number]                       ADMIT DATE: 2025  PROVIDER: René Longoria MD    DATE OF SERVICE:  2025    PREOPERATIVE DIAGNOSES:  Multivessel coronary artery disease.    POSTOPERATIVE DIAGNOSES:  Multivessel coronary artery disease.    PROCEDURES PERFORMED:       1. Coronary artery bypass grafting x2 (left internal mammary artery to left anterior descending artery; saphenous vein graft to obtuse marginal).     2. Endoscopic vein harvest, right lower extremity.    SURGEON:  René Longoria MD    ASSISTANT:  Derek Morrow.    ANESTHESIA:  General endotracheal anesthesia.    ESTIMATED BLOOD LOSS:  50 mL.    SPECIMENS REMOVED:  None.    INTRAOPERATIVE FINDINGS:  None     COMPLICATIONS:  None.    IMPLANTS:  None.    INDICATIONS:  The patient is a very pleasant 70-year-old woman, who was recently diagnosed with multivessel coronary artery disease.  She is now being brought to the operating room to undergo coronary artery bypass grafting.    DESCRIPTION OF PROCEDURE:  The patient was brought to the operating room, had a right radial A-line without complications.  [place]-Kvng catheter was placed without complications.  Underwent general endotracheal anesthesia without complications.  Chest, abdomen, and lower extremities were prepped and draped in the usual fashion.  A midline incision was made on the patient's sternum.  Cautery dissection was used to dissect down to the level of sternal bone and the sternal bone with a sagittal saw, and the left pleural space was entered.  During this portion of the procedure, endoscopic vein harvesting was performed from the right lower extremity without complications.  Once the left pleural space was

## 2025-06-10 ENCOUNTER — TELEPHONE (OUTPATIENT)
Age: 70
End: 2025-06-10

## 2025-06-10 NOTE — PROGRESS NOTES
Patient: Ping Salazar   Age: 70 y.o.     Patient Care Team:  No, Pcp as PCP - René Landa MD as Consulting Physician (Cardiothoracic Surgery)  Joce Muñoz DO as Consulting Physician (Cardiovascular Disease)    Diagnosis: The encounter diagnosis was S/P CABG x 2.    Problem List:   Patient Active Problem List   Diagnosis    Depression    DM (diabetes mellitus), type 2 (HCC)    Vertigo    Vertebrobasilar insufficiency    TIA (transient ischemic attack)    Abnormal CT scan of heart    Disease of cardiovascular system    CAD in native artery    Hemoglobin A1C greater than 9%, indicating poor diabetic control    S/P CABG x 2      Date of Surgery: 5/8/25     Surgery:   CABG X 2 LIMA to LAD, RSVG to OM  BILAT ENDOSCOPIC SAPHENOUS VEIN HARVEST    HPI:  Pt is here for post op follow up. Nausea resolved. -130s for the most part, today was 170. She has some SOB with exertion. She feels ok. She has concerns she would like to speak with Dr. Longoria about.     Current Medications:   Current Outpatient Medications   Medication Sig Dispense Refill    cephALEXin (KEFLEX) 500 MG capsule Take 1 capsule by mouth 2 times daily for 7 days 14 capsule 0    ondansetron (ZOFRAN) 4 MG tablet Take 1 tablet by mouth 3 times daily as needed for Nausea or Vomiting 15 tablet 0    atorvastatin (LIPITOR) 80 MG tablet Take 1 tablet by mouth at bedtime 90 tablet 3    metoprolol succinate (TOPROL XL) 25 MG extended release tablet Take 0.5 tablets by mouth daily 30 tablet 2    clopidogrel (PLAVIX) 75 MG tablet Take 1 tablet by mouth daily 30 tablet 3    glimepiride (AMARYL) 2 MG tablet Take 1 tablet by mouth every morning (before breakfast) 30 tablet 2    Blood Glucose Monitoring Suppl (BLOOD GLUCOSE MONITOR SYSTEM) w/Device KIT Pharmacist to identify preferred meter and strips. 1 kit 0    blood glucose monitor strips by Other route 2 times daily Test 2 times a day BEFORE breakfast and BEDTIME  & as needed for symptoms of

## 2025-06-10 NOTE — TELEPHONE ENCOUNTER
CALLED PATIENT ABOUT HER MISSED APPT AND TO CALL BACK TO RESCHEDULE HER 1 MON. POST OP. LEFT A MESSAGE ONCE AGAIN.

## 2025-06-11 ENCOUNTER — OFFICE VISIT (OUTPATIENT)
Age: 70
End: 2025-06-11

## 2025-06-11 VITALS
HEART RATE: 93 BPM | BODY MASS INDEX: 29.05 KG/M2 | WEIGHT: 185.5 LBS | DIASTOLIC BLOOD PRESSURE: 67 MMHG | SYSTOLIC BLOOD PRESSURE: 100 MMHG | OXYGEN SATURATION: 97 %

## 2025-06-11 DIAGNOSIS — Z95.1 S/P CABG X 2: Primary | ICD-10-CM

## 2025-06-11 PROCEDURE — 99024 POSTOP FOLLOW-UP VISIT: CPT | Performed by: NURSE PRACTITIONER

## 2025-06-11 RX ORDER — CEPHALEXIN 500 MG/1
500 CAPSULE ORAL 2 TIMES DAILY
Qty: 14 CAPSULE | Refills: 0 | Status: SHIPPED | OUTPATIENT
Start: 2025-06-11 | End: 2025-06-18

## 2025-06-17 ENCOUNTER — TELEPHONE (OUTPATIENT)
Age: 70
End: 2025-06-17

## 2025-07-09 ENCOUNTER — OFFICE VISIT (OUTPATIENT)
Age: 70
End: 2025-07-09
Payer: MEDICARE

## 2025-07-09 VITALS
HEART RATE: 84 BPM | BODY MASS INDEX: 29.19 KG/M2 | OXYGEN SATURATION: 97 % | RESPIRATION RATE: 20 BRPM | DIASTOLIC BLOOD PRESSURE: 68 MMHG | SYSTOLIC BLOOD PRESSURE: 118 MMHG | WEIGHT: 186 LBS | HEIGHT: 67 IN

## 2025-07-09 DIAGNOSIS — I50.22 CHRONIC SYSTOLIC HEART FAILURE (HCC): ICD-10-CM

## 2025-07-09 DIAGNOSIS — Z79.899 MEDICATION MANAGEMENT: ICD-10-CM

## 2025-07-09 DIAGNOSIS — I25.118 CORONARY ARTERY DISEASE OF NATIVE ARTERY OF NATIVE HEART WITH STABLE ANGINA PECTORIS: Primary | ICD-10-CM

## 2025-07-09 PROCEDURE — G8400 PT W/DXA NO RESULTS DOC: HCPCS | Performed by: STUDENT IN AN ORGANIZED HEALTH CARE EDUCATION/TRAINING PROGRAM

## 2025-07-09 PROCEDURE — G8417 CALC BMI ABV UP PARAM F/U: HCPCS | Performed by: STUDENT IN AN ORGANIZED HEALTH CARE EDUCATION/TRAINING PROGRAM

## 2025-07-09 PROCEDURE — 1090F PRES/ABSN URINE INCON ASSESS: CPT | Performed by: STUDENT IN AN ORGANIZED HEALTH CARE EDUCATION/TRAINING PROGRAM

## 2025-07-09 PROCEDURE — 1159F MED LIST DOCD IN RCRD: CPT | Performed by: STUDENT IN AN ORGANIZED HEALTH CARE EDUCATION/TRAINING PROGRAM

## 2025-07-09 PROCEDURE — G8427 DOCREV CUR MEDS BY ELIG CLIN: HCPCS | Performed by: STUDENT IN AN ORGANIZED HEALTH CARE EDUCATION/TRAINING PROGRAM

## 2025-07-09 PROCEDURE — 3017F COLORECTAL CA SCREEN DOC REV: CPT | Performed by: STUDENT IN AN ORGANIZED HEALTH CARE EDUCATION/TRAINING PROGRAM

## 2025-07-09 PROCEDURE — G2211 COMPLEX E/M VISIT ADD ON: HCPCS | Performed by: STUDENT IN AN ORGANIZED HEALTH CARE EDUCATION/TRAINING PROGRAM

## 2025-07-09 PROCEDURE — 99214 OFFICE O/P EST MOD 30 MIN: CPT | Performed by: STUDENT IN AN ORGANIZED HEALTH CARE EDUCATION/TRAINING PROGRAM

## 2025-07-09 PROCEDURE — 1036F TOBACCO NON-USER: CPT | Performed by: STUDENT IN AN ORGANIZED HEALTH CARE EDUCATION/TRAINING PROGRAM

## 2025-07-09 PROCEDURE — 1123F ACP DISCUSS/DSCN MKR DOCD: CPT | Performed by: STUDENT IN AN ORGANIZED HEALTH CARE EDUCATION/TRAINING PROGRAM

## 2025-07-09 RX ORDER — ROSUVASTATIN CALCIUM 40 MG/1
40 TABLET, COATED ORAL DAILY
Qty: 90 TABLET | Refills: 1 | Status: SHIPPED | OUTPATIENT
Start: 2025-07-09

## 2025-07-09 RX ORDER — CLOPIDOGREL BISULFATE 75 MG/1
75 TABLET ORAL DAILY
Qty: 90 TABLET | Refills: 3 | Status: SHIPPED | OUTPATIENT
Start: 2025-07-09

## 2025-07-09 NOTE — PROGRESS NOTES
[place]  [id number] [facility], [address]  [place] [address]    Office [phone],Fax [phone]           Ping Salazar is a 70 y.o. female presents to the office for follow-up visit      Assessment/Recommendations:     Diagnosis Orders   1. Coronary artery disease of native artery of native heart with stable angina pectoris        2. Chronic systolic heart failure (HCC)  Lipid Panel    Echo (TTE) complete (PRN contrast/bubble/strain/3D)      3. Medication management  Lipid Panel          Coronary atherosclerosis.  coronary CTA showed calcium score of greater than 3000 with severe diffuse calcification, subsequent cardiac catheterization showed severe multivessel coronary artery disease  S/p cabg  5/7/2025  MACEDO to LAD, RSVG to OM   - Continue DAPT  - Continue aggressive lipid-lowering therapy with atorvastatin 80 mg daily  - Continue beta-blocker    T2DM, uncontrolled  Currently on SGLT2 inhibitor therapy, may benefit from GLP-1 therapy.      TIA 3/2023.  MRI without areas of infarction.  CT angiography showed Multifocal moderate to severe stenoses of the posterior communicating artery and posterior cerebral artery on the right. Moderate atherosclerotic plaque of the common carotid artery on the right with mild stenosis of the proximal right ICA.    CHAR based on history, previously referred to sleep medicine    Follow-up 6 months    Subjective:    Chest pain and breathing appears to have improved since CABG.  No ongoing exertional chest pain symptoms.  No orthopnea or PND.  Stable class II-III dyspnea symptoms.    Past Medical History:   Diagnosis Date    Anxiety     Cerebral artery occlusion with cerebral infarction (HCC)     Depression     Diabetes (HCC)     Falls     Fatigue     Neuropathy     Psychiatric disorder     depression    Snoring     Visual disturbance         Past Surgical History:   Procedure Laterality Date    CARDIAC PROCEDURE N/A 4/16/2025

## 2025-07-09 NOTE — PROGRESS NOTES
had concerns including Congestive Heart Failure, Other (CABG), T2DM, and Follow-Up from Hospital.    Vitals:    07/09/25 1149   BP: 118/68   BP Site: Left Upper Arm   Patient Position: Sitting   Pulse: 84   Resp: 20   SpO2: 97%   Weight: 84.4 kg (186 lb)   Height: 1.702 m (5' 7\")         Pt Denies current chest pain

## 2025-07-16 ENCOUNTER — ANCILLARY PROCEDURE (OUTPATIENT)
Age: 70
End: 2025-07-16
Payer: MEDICARE

## 2025-07-16 VITALS
HEIGHT: 71 IN | WEIGHT: 181 LBS | BODY MASS INDEX: 25.34 KG/M2 | DIASTOLIC BLOOD PRESSURE: 70 MMHG | HEART RATE: 82 BPM | SYSTOLIC BLOOD PRESSURE: 122 MMHG

## 2025-07-16 DIAGNOSIS — I50.22 CHRONIC SYSTOLIC HEART FAILURE (HCC): ICD-10-CM

## 2025-07-16 LAB
ECHO AO ROOT DIAM: 3.3 CM
ECHO AO ROOT INDEX: 1.63 CM/M2
ECHO AV AREA PEAK VELOCITY: 2.3 CM2
ECHO AV AREA VTI: 2.2 CM2
ECHO AV AREA/BSA PEAK VELOCITY: 1.1 CM2/M2
ECHO AV AREA/BSA VTI: 1.1 CM2/M2
ECHO AV MEAN GRADIENT: 4 MMHG
ECHO AV MEAN VELOCITY: 1 M/S
ECHO AV PEAK GRADIENT: 8 MMHG
ECHO AV PEAK VELOCITY: 1.4 M/S
ECHO AV VELOCITY RATIO: 0.64
ECHO AV VTI: 25.9 CM
ECHO BSA: 2.03 M2
ECHO EST RA PRESSURE: 3 MMHG
ECHO LA DIAMETER INDEX: 1.88 CM/M2
ECHO LA DIAMETER: 3.8 CM
ECHO LA TO AORTIC ROOT RATIO: 1.15
ECHO LA VOL A-L A2C: 61 ML (ref 22–52)
ECHO LA VOL A-L A4C: 62 ML (ref 22–52)
ECHO LA VOL BP: 61 ML (ref 22–52)
ECHO LA VOL MOD A2C: 59 ML (ref 22–52)
ECHO LA VOL MOD A4C: 55 ML (ref 22–52)
ECHO LA VOL/BSA BIPLANE: 30 ML/M2 (ref 16–34)
ECHO LA VOLUME AREA LENGTH: 66 ML
ECHO LA VOLUME INDEX A-L A2C: 30 ML/M2 (ref 16–34)
ECHO LA VOLUME INDEX A-L A4C: 31 ML/M2 (ref 16–34)
ECHO LA VOLUME INDEX AREA LENGTH: 33 ML/M2 (ref 16–34)
ECHO LA VOLUME INDEX MOD A2C: 29 ML/M2 (ref 16–34)
ECHO LA VOLUME INDEX MOD A4C: 27 ML/M2 (ref 16–34)
ECHO LV E' LATERAL VELOCITY: 11.53 CM/S
ECHO LV E' SEPTAL VELOCITY: 6.16 CM/S
ECHO LV EDV A2C: 95 ML
ECHO LV EDV A4C: 85 ML
ECHO LV EDV BP: 91 ML (ref 56–104)
ECHO LV EDV INDEX A4C: 42 ML/M2
ECHO LV EDV INDEX BP: 45 ML/M2
ECHO LV EDV NDEX A2C: 47 ML/M2
ECHO LV EF PHYSICIAN: 57 %
ECHO LV EJECTION FRACTION A2C: 65 %
ECHO LV EJECTION FRACTION A4C: 48 %
ECHO LV EJECTION FRACTION BIPLANE: 57 % (ref 55–100)
ECHO LV ESV A2C: 34 ML
ECHO LV ESV A4C: 44 ML
ECHO LV ESV BP: 40 ML (ref 19–49)
ECHO LV ESV INDEX A2C: 17 ML/M2
ECHO LV ESV INDEX A4C: 22 ML/M2
ECHO LV ESV INDEX BP: 20 ML/M2
ECHO LV FRACTIONAL SHORTENING: 28 % (ref 28–44)
ECHO LV INTERNAL DIMENSION DIASTOLE INDEX: 2.13 CM/M2
ECHO LV INTERNAL DIMENSION DIASTOLIC: 4.3 CM (ref 3.9–5.3)
ECHO LV INTERNAL DIMENSION SYSTOLIC INDEX: 1.53 CM/M2
ECHO LV INTERNAL DIMENSION SYSTOLIC: 3.1 CM
ECHO LV IVSD: 1.1 CM (ref 0.6–0.9)
ECHO LV MASS 2D: 142.5 G (ref 67–162)
ECHO LV MASS INDEX 2D: 70.5 G/M2 (ref 43–95)
ECHO LV POSTERIOR WALL DIASTOLIC: 0.9 CM (ref 0.6–0.9)
ECHO LV RELATIVE WALL THICKNESS RATIO: 0.42
ECHO LVOT AREA: 3.8 CM2
ECHO LVOT AV VTI INDEX: 0.59
ECHO LVOT DIAM: 2.2 CM
ECHO LVOT MEAN GRADIENT: 2 MMHG
ECHO LVOT PEAK GRADIENT: 3 MMHG
ECHO LVOT PEAK VELOCITY: 0.9 M/S
ECHO LVOT STROKE VOLUME INDEX: 29 ML/M2
ECHO LVOT SV: 58.5 ML
ECHO LVOT VTI: 15.4 CM
ECHO MV A VELOCITY: 1.35 M/S
ECHO MV AREA PHT: 2.5 CM2
ECHO MV E DECELERATION TIME (DT): 304.3 MS
ECHO MV E VELOCITY: 0.99 M/S
ECHO MV E/A RATIO: 0.73
ECHO MV E/E' LATERAL: 8.59
ECHO MV E/E' RATIO (AVERAGED): 12.33
ECHO MV E/E' SEPTAL: 16.07
ECHO MV PRESSURE HALF TIME (PHT): 88.2 MS
ECHO RIGHT VENTRICULAR SYSTOLIC PRESSURE (RVSP): 30 MMHG
ECHO RV FREE WALL PEAK S': 8.6 CM/S
ECHO RV INTERNAL DIMENSION: 3.6 CM
ECHO RV TAPSE: 1.1 CM (ref 1.7–?)
ECHO TV REGURGITANT MAX VELOCITY: 2.58 M/S
ECHO TV REGURGITANT PEAK GRADIENT: 27 MMHG

## 2025-07-16 PROCEDURE — 93306 TTE W/DOPPLER COMPLETE: CPT | Performed by: STUDENT IN AN ORGANIZED HEALTH CARE EDUCATION/TRAINING PROGRAM

## 2025-07-28 ENCOUNTER — TELEPHONE (OUTPATIENT)
Facility: CLINIC | Age: 70
End: 2025-07-28

## 2025-07-28 NOTE — TELEPHONE ENCOUNTER
----- Message from Marva CERRATO sent at 7/28/2025  3:46 PM EDT -----  Regarding: [facility] Appointment Request  [facility] Appointment Request    Patient needs appointment for [facility] Appointment Type: New to Provider.    Patient Requested Dates(s):August 2025, except the August 5  Patient Requested Time:between 12:00 - 1:00 pm  Provider Name:Bird Villavicencio MD      Reason for Appointment Request: New Patient - No appointments available during search  --------------------------------------------------------------------------------------------------------------------------    Relationship to Patient: Self     Call Back Information: OK to leave message on [employer]  Preferred Call Back Number: Phone [phone]

## 2025-08-28 ENCOUNTER — HOSPITAL ENCOUNTER (EMERGENCY)
Facility: HOSPITAL | Age: 70
Discharge: HOME | End: 2025-08-28
Attending: EMERGENCY MEDICINE
Payer: MEDICARE

## 2025-08-28 ENCOUNTER — APPOINTMENT (OUTPATIENT)
Facility: HOSPITAL | Age: 70
End: 2025-08-28
Payer: MEDICARE

## 2025-08-28 VITALS
DIASTOLIC BLOOD PRESSURE: 67 MMHG | HEIGHT: 67 IN | TEMPERATURE: 98.1 F | HEART RATE: 83 BPM | SYSTOLIC BLOOD PRESSURE: 110 MMHG | WEIGHT: 184 LBS | RESPIRATION RATE: 16 BRPM | BODY MASS INDEX: 28.88 KG/M2 | OXYGEN SATURATION: 100 %

## 2025-08-28 DIAGNOSIS — S09.90XA CLOSED HEAD INJURY, INITIAL ENCOUNTER: Primary | ICD-10-CM

## 2025-08-28 PROCEDURE — 70450 CT HEAD/BRAIN W/O DYE: CPT

## 2025-08-28 PROCEDURE — 6370000000 HC RX 637 (ALT 250 FOR IP): Performed by: EMERGENCY MEDICINE

## 2025-08-28 PROCEDURE — 99284 EMERGENCY DEPT VISIT MOD MDM: CPT

## 2025-08-28 RX ORDER — ACETAMINOPHEN 325 MG/1
650 TABLET ORAL
Status: COMPLETED | OUTPATIENT
Start: 2025-08-28 | End: 2025-08-28

## 2025-08-28 RX ADMIN — ACETAMINOPHEN 650 MG: 325 TABLET ORAL at 11:10

## 2025-08-28 ASSESSMENT — PAIN SCALES - GENERAL: PAINLEVEL_OUTOF10: 1

## 2025-08-28 ASSESSMENT — LIFESTYLE VARIABLES
HOW OFTEN DO YOU HAVE A DRINK CONTAINING ALCOHOL: NEVER
HOW MANY STANDARD DRINKS CONTAINING ALCOHOL DO YOU HAVE ON A TYPICAL DAY: PATIENT DOES NOT DRINK

## 2025-08-28 ASSESSMENT — PAIN - FUNCTIONAL ASSESSMENT: PAIN_FUNCTIONAL_ASSESSMENT: 0-10

## (undated) PROCEDURE — 06BQ4ZZ EXCISION OF LEFT SAPHENOUS VEIN, PERCUTANEOUS ENDOSCOPIC APPROACH: ICD-10-PCS

## (undated) PROCEDURE — 02100Z9 BYPASS CORONARY ARTERY, ONE ARTERY FROM LEFT INTERNAL MAMMARY, OPEN APPROACH: ICD-10-PCS

## (undated) PROCEDURE — 5A1221Z PERFORMANCE OF CARDIAC OUTPUT, CONTINUOUS: ICD-10-PCS

## (undated) PROCEDURE — 021009W BYPASS CORONARY ARTERY, ONE ARTERY FROM AORTA WITH AUTOLOGOUS VENOUS TISSUE, OPEN APPROACH: ICD-10-PCS

## (undated) PROCEDURE — 06BP4ZZ EXCISION OF RIGHT SAPHENOUS VEIN, PERCUTANEOUS ENDOSCOPIC APPROACH: ICD-10-PCS

## (undated) DEVICE — DRAIN SURG SGL COLL PT TB FOR ATS BG OASIS

## (undated) DEVICE — LEAD PACE L475MM CHNL A OR V MYOCARDIAL STEROID ELUT SIL

## (undated) DEVICE — 40418 TRENDELENBURG ONE-STEP ARM PROTECTORS LARGE (1 PAIR): Brand: 40418 TRENDELENBURG ONE-STEP ARM PROTECTORS LARGE (1 PAIR)

## (undated) DEVICE — BANDAGE COMPR M W6INXL10YD WHT BGE VELC E MTRX HK AND LOOP

## (undated) DEVICE — DRAIN,WOUND,ROUND,24FR,5/16",FULL-FLUTED: Brand: MEDLINE

## (undated) DEVICE — ADHESIVE SKIN CLOSURE XL 42 CM 2.7 CC MESH LIQUIBAND SECUR

## (undated) DEVICE — DRAPE SLUSH DISC W44XL66IN ST FOR RND BSIN HUSH SLUSH SYS

## (undated) DEVICE — PROVE COVER: Brand: UNBRANDED

## (undated) DEVICE — GOWN,SIRUS,NONRNF,SETINSLV,XL,20/CS: Brand: MEDLINE

## (undated) DEVICE — SYRINGE MED 50ML LUERLOCK TIP

## (undated) DEVICE — TIDISHIELD TRANSPORT, CONTAINMENT COVER FOR BACK TABLE 4'6" (1.37M) TO 8' (2.43M) IN LENGTH: Brand: TIDISHIELD

## (undated) DEVICE — BLADE OPHTH 180DEG CUT SURF BLU STR SHRP DBL BVL GRINDLESS

## (undated) DEVICE — CONNECTOR DRNGE 3/8 1/2X3/16X3/16IN BASE L5MM ARM L10-13MM

## (undated) DEVICE — ARGO BAGZ FLUID MANAGEMENT SYSTEM: Brand: ARGO BAGZ FLUID MANAGEMENT SYSTEM

## (undated) DEVICE — GLOVE SURG SZ 7.5 L11.2IN THK9.8MIL STRW LTX POLYMER BEAD

## (undated) DEVICE — KIT BLWR MISTER 5P 15L W/ TBNG SET IRRIG MIST TO IMPROVE

## (undated) DEVICE — TR BAND RADIAL ARTERY COMPRESSION DEVICE: Brand: TR BAND

## (undated) DEVICE — CANNULA PERF L2IN BLNT TIP 2MM VES CLR RADPQ BODY FEM LUER

## (undated) DEVICE — OPEN HEART A- RICHMOND: Brand: MEDLINE INDUSTRIES, INC.

## (undated) DEVICE — RETRACTOR SURG INSRT SUT HLD OCTOBASE

## (undated) DEVICE — SUTURE PROL SZ 4-0 L36IN NONABSORBABLE BLU L26MM SH 1/2 CIR 8521H

## (undated) DEVICE — GLIDESHEATH SLENDER STAINLESS STEEL KIT: Brand: GLIDESHEATH SLENDER

## (undated) DEVICE — SUTURE MONOCRYL + SZ 3-0 L27IN ABSRB UD PS1 L24MM 3/8 CIR REV MCP936H

## (undated) DEVICE — SUTURE PROL SZ 6-0 L24IN NONABSORBABLE BLU L13MM C-1 3/8 8726H

## (undated) DEVICE — TRANSFER BAG 300 ML: Brand: HAEMONETICS

## (undated) DEVICE — CARD SMRT DISP TRANSIT TIME MEDISTEM VERIQ

## (undated) DEVICE — BLADE OPHTH KNF D3MM 15DEG CATRCT BLU MICRO-SHARP

## (undated) DEVICE — AVID DUAL STAGE VENOUS DRAINAGE CANNULA: Brand: AVID DUAL STAGE VENOUS DRAINAGE CANNULA

## (undated) DEVICE — OPEN HEART B-RICHMOND: Brand: MEDLINE INDUSTRIES, INC.

## (undated) DEVICE — SOLUTION IV 1000 ML 0.9 NACL INJ USP EXCEL PLAS CONTAINER

## (undated) DEVICE — 6 FOOT DISPOSABLE EXTENSION CABLE WITH SAFE CONNECT / SCREW-DOWN

## (undated) DEVICE — SOLUTION IV 500 ML 0.9 NACL INJ EXCEL CONTAINER USP LF

## (undated) DEVICE — Device: Brand: JELCO

## (undated) DEVICE — CATHETER DIAG 5FR L100CM LUMN ID0.047IN JL3.5 CRV 0 SIDE H

## (undated) DEVICE — KIT,ANTI FOG,W/SPONGE & FLUID,SOFT PACK: Brand: MEDLINE

## (undated) DEVICE — HEART CATH-SFMC: Brand: MEDLINE INDUSTRIES, INC.

## (undated) DEVICE — ABSORBABLE COLLAGEN HEMOSTATIC SPONGE, 3IN X 4IN, 5MM THICK: Brand: HELISTAT ® ABSORBABLE COLLAGEN HEMOSTATIC SPONGE

## (undated) DEVICE — SYRINGE MED 10ML LUERLOCK TIP W/O SFTY DISP

## (undated) DEVICE — X-RAY DETECTABLE SPONGES,16 PLY: Brand: VISTEC

## (undated) DEVICE — COVER,LIGHT,CAMERA,HARD,1/PK,STRL: Brand: MEDLINE

## (undated) DEVICE — SYSTEM ENDOSCP VES HARV W/ TOOL CANN SEAL SHT PRT BLNT TIP

## (undated) DEVICE — AGENT HEMSTAT W4XL4IN OXIDIZED REGENERATED CELOS ABSRB SFT

## (undated) DEVICE — SOLUTION IV 1000ML 140MEQ/L SOD 5MEQ/L K 3MEQ/L MG 27MEQ/L

## (undated) DEVICE — AORTIC PUNCHES ARE USED TO CREATE A UNIFORM OPENING IN BLOOD VESSELS DURING CARDIOVASCULAR SURGERY. THE VESSEL GRAFT IS INSERTED INTO THE CREATED OPENING AND SUTURED TO THE VESSEL WALL. AORTIC LANCETS ARE USED TO MAKE A SMALL UNIFORM CUT IN A BLOOD VESSEL TO FACILITATE INSERTION OF AN AORTIC PUNCH.  PUNCHES COME IN VARIOUS LENGTHS, DIAMETERS AND TIP CONFIGURATIONS.: Brand: CLEANCUT ROTATING AORTIC PUNCH

## (undated) DEVICE — PRESSURE MONITORING SET: Brand: TRUWAVE

## (undated) DEVICE — SUTURE SZ 7 L18IN NONABSORBABLE SIL CCS L48MM 1/2 CIR STRNM M655G

## (undated) DEVICE — WRAP SURG W1.31XL1.34M CARD FOR PT 165-172CM THERMOWRP

## (undated) DEVICE — SOLUTION IV 1000ML PH 7.4 INJ NRMSOL R

## (undated) DEVICE — SUTURE PROL SZ 5-0 L30IN NONABSORBABLE BLU L13MM RB-2 1/2 8710H

## (undated) DEVICE — CATHETER DIAG 5FR L100CM LUMN ID0.047IN JR4 CRV 0 SIDE H

## (undated) DEVICE — 1000ML,PRESSURE INFUSER W/STOPCOCK: Brand: MEDLINE

## (undated) DEVICE — TUBING PRSS MON L12IN PVC RIG NONEXPANDING M TO FEM CONN

## (undated) DEVICE — Device

## (undated) DEVICE — SUTURE DEK POLY GRN BR SZ3 0 T 2 2N 6716

## (undated) DEVICE — LIQUIBAND RAPID ADHESIVE 36/CS 0.8ML: Brand: MEDLINE

## (undated) DEVICE — TBG INSUFFLATION LUER LOCK: Brand: MEDLINE INDUSTRIES, INC.

## (undated) DEVICE — SUTURE VICRYL SZ 0 L18IN ABSRB VLT L36MM CT-1 1/2 CIR J740D

## (undated) DEVICE — CANNULA SUCT L8.5IN DIA20FR VENT CONN 3/8IN ART MTL CRV TIP

## (undated) DEVICE — SUTURE VICRYL + SZ 2-0 L27IN ABSRB WHT SH 1/2 CIR TAPERCUT VCP417H

## (undated) DEVICE — DRESSING FOAM W8.7XL9.1IN SAFETAC LAYR SELF ADH MEPILEX

## (undated) DEVICE — BANDAGE COMPR ELASTIC 5 YDX6 IN

## (undated) DEVICE — TEMP PACING WIRE: Brand: MYO/WIRE

## (undated) DEVICE — GUIDEWIRE VASC L180CM 0035IN 15MM J ROSEN TIP PTFE FIX COR

## (undated) DEVICE — SUTURE NONABSORBABLE MONOFILAMENT 7-0 BV-1 1X24 IN PROLENE 8702H

## (undated) DEVICE — MEDI-TRACE CADENCE ADULT, DEFIBRILLATION ELECTRODE -RTS  (10 PR/PK) - PHYSIO-CONTROL: Brand: MEDI-TRACE CADENCE

## (undated) DEVICE — SUTURE PROL SZ 8-0 L24IN NONABSORBABLE BLU L6.5MM BV130-5 8732H